# Patient Record
Sex: FEMALE | Race: WHITE | Employment: OTHER | URBAN - METROPOLITAN AREA
[De-identification: names, ages, dates, MRNs, and addresses within clinical notes are randomized per-mention and may not be internally consistent; named-entity substitution may affect disease eponyms.]

---

## 2017-01-12 DIAGNOSIS — I48.21 PERMANENT ATRIAL FIBRILLATION (H): Primary | ICD-10-CM

## 2017-01-12 RX ORDER — DABIGATRAN ETEXILATE 150 MG/1
150 CAPSULE ORAL 2 TIMES DAILY
Qty: 180 CAPSULE | Refills: 0 | Status: SHIPPED | OUTPATIENT
Start: 2017-01-12 | End: 2017-04-19

## 2017-04-18 ENCOUNTER — PRE VISIT (OUTPATIENT)
Dept: CARDIOLOGY | Facility: CLINIC | Age: 82
End: 2017-04-18

## 2017-04-19 ENCOUNTER — OFFICE VISIT (OUTPATIENT)
Dept: CARDIOLOGY | Facility: CLINIC | Age: 82
End: 2017-04-19
Attending: INTERNAL MEDICINE
Payer: COMMERCIAL

## 2017-04-19 VITALS
SYSTOLIC BLOOD PRESSURE: 146 MMHG | DIASTOLIC BLOOD PRESSURE: 89 MMHG | WEIGHT: 175.6 LBS | HEIGHT: 62 IN | HEART RATE: 68 BPM | BODY MASS INDEX: 32.31 KG/M2

## 2017-04-19 DIAGNOSIS — I48.21 PERMANENT ATRIAL FIBRILLATION (H): ICD-10-CM

## 2017-04-19 DIAGNOSIS — I10 ESSENTIAL HYPERTENSION, BENIGN: ICD-10-CM

## 2017-04-19 DIAGNOSIS — I10 ESSENTIAL HYPERTENSION, BENIGN: Primary | ICD-10-CM

## 2017-04-19 LAB
ALT SERPL W P-5'-P-CCNC: <5 U/L (ref 5–30)
ANION GAP SERPL CALCULATED.3IONS-SCNC: 9.6 MMOL/L (ref 6–17)
BUN SERPL-MCNC: 19 MG/DL (ref 7–30)
CALCIUM SERPL-MCNC: 8.8 MG/DL (ref 8.5–10.5)
CHLORIDE SERPL-SCNC: 97 MMOL/L (ref 98–107)
CHOLEST SERPL-MCNC: 157 MG/DL
CO2 SERPL-SCNC: 27 MMOL/L (ref 23–29)
CREAT SERPL-MCNC: 1.19 MG/DL (ref 0.7–1.3)
DIGOXIN SERPL-MCNC: 1.3 UG/L (ref 0.5–2)
ERYTHROCYTE [DISTWIDTH] IN BLOOD BY AUTOMATED COUNT: 12.4 % (ref 10–15)
GFR SERPL CREATININE-BSD FRML MDRD: 43 ML/MIN/1.7M2
GLUCOSE SERPL-MCNC: 81 MG/DL (ref 70–105)
HCT VFR BLD AUTO: 44.3 % (ref 35–47)
HDLC SERPL-MCNC: 52 MG/DL
HGB BLD-MCNC: 15.1 G/DL (ref 11.7–15.7)
LDLC SERPL CALC-MCNC: 71 MG/DL
MCH RBC QN AUTO: 33.7 PG (ref 26.5–33)
MCHC RBC AUTO-ENTMCNC: 34.1 G/DL (ref 31.5–36.5)
MCV RBC AUTO: 99 FL (ref 78–100)
NONHDLC SERPL-MCNC: 105 MG/DL
PLATELET # BLD AUTO: 359 10E9/L (ref 150–450)
POTASSIUM SERPL-SCNC: 4.6 MMOL/L (ref 3.5–5.1)
RBC # BLD AUTO: 4.48 10E12/L (ref 3.8–5.2)
SODIUM SERPL-SCNC: 129 MMOL/L (ref 136–145)
TRIGL SERPL-MCNC: 171 MG/DL
TSH SERPL DL<=0.05 MIU/L-ACNC: 1.48 MU/L (ref 0.4–4)
WBC # BLD AUTO: 10.3 10E9/L (ref 4–11)

## 2017-04-19 PROCEDURE — 99214 OFFICE O/P EST MOD 30 MIN: CPT | Mod: 25 | Performed by: NURSE PRACTITIONER

## 2017-04-19 PROCEDURE — 84460 ALANINE AMINO (ALT) (SGPT): CPT | Performed by: NURSE PRACTITIONER

## 2017-04-19 PROCEDURE — 80061 LIPID PANEL: CPT | Performed by: NURSE PRACTITIONER

## 2017-04-19 PROCEDURE — 80162 ASSAY OF DIGOXIN TOTAL: CPT | Performed by: NURSE PRACTITIONER

## 2017-04-19 PROCEDURE — 85027 COMPLETE CBC AUTOMATED: CPT | Performed by: NURSE PRACTITIONER

## 2017-04-19 PROCEDURE — 36415 COLL VENOUS BLD VENIPUNCTURE: CPT | Performed by: NURSE PRACTITIONER

## 2017-04-19 PROCEDURE — 93000 ELECTROCARDIOGRAM COMPLETE: CPT | Performed by: NURSE PRACTITIONER

## 2017-04-19 PROCEDURE — 80048 BASIC METABOLIC PNL TOTAL CA: CPT | Performed by: NURSE PRACTITIONER

## 2017-04-19 PROCEDURE — 84443 ASSAY THYROID STIM HORMONE: CPT | Performed by: NURSE PRACTITIONER

## 2017-04-19 RX ORDER — DIGOXIN 125 MCG
125 TABLET ORAL DAILY
Qty: 90 TABLET | Refills: 3 | Status: SHIPPED | OUTPATIENT
Start: 2017-04-19 | End: 2018-01-01

## 2017-04-19 RX ORDER — DABIGATRAN ETEXILATE 150 MG/1
150 CAPSULE ORAL 2 TIMES DAILY
Qty: 180 CAPSULE | Refills: 3 | Status: SHIPPED | OUTPATIENT
Start: 2017-04-19

## 2017-04-19 RX ORDER — DILTIAZEM HYDROCHLORIDE 180 MG/1
360 CAPSULE, EXTENDED RELEASE ORAL DAILY
Qty: 180 CAPSULE | Refills: 3 | Status: SHIPPED | OUTPATIENT
Start: 2017-04-19

## 2017-04-19 RX ORDER — RAMIPRIL 10 MG/1
10 CAPSULE ORAL 2 TIMES DAILY
Qty: 180 CAPSULE | Refills: 3 | Status: SHIPPED | OUTPATIENT
Start: 2017-04-19

## 2017-04-19 RX ORDER — METOPROLOL SUCCINATE 25 MG/1
25 TABLET, EXTENDED RELEASE ORAL DAILY
Qty: 90 TABLET | Refills: 3 | Status: SHIPPED | OUTPATIENT
Start: 2017-04-19 | End: 2018-01-01

## 2017-04-19 RX ORDER — SPIRONOLACTONE 25 MG/1
25 TABLET ORAL DAILY
Qty: 90 TABLET | Refills: 3 | Status: SHIPPED | OUTPATIENT
Start: 2017-04-19 | End: 2017-11-22 | Stop reason: DRUGHIGH

## 2017-04-19 RX ORDER — PRAZOSIN HYDROCHLORIDE 1 MG/1
1 CAPSULE ORAL 2 TIMES DAILY
Qty: 180 CAPSULE | Refills: 3 | Status: SHIPPED | OUTPATIENT
Start: 2017-04-19

## 2017-04-19 NOTE — MR AVS SNAPSHOT
After Visit Summary   4/19/2017    Tita Escobar    MRN: [id number]           Patient Information     Date Of Birth          9/23/1932        Visit Information        Provider Department      4/19/2017 2:30 PM Radha Reddy APRN CNP [facility] HEART [facility]        Today's Diagnoses     Essential hypertension, benign    -  1    Chronic atrial fibrillation (H)        Permanent atrial fibrillation (H)        Essential hypertension          Care Instructions    Labs today  Watch blood pressure  See  next year or sooner if you're having concerns        Follow-ups after your visit        Future tests that were ordered for you today     Open Future Orders        Priority Expected Expires Ordered    Digoxin level Routine 4/19/2017 4/19/2019 4/19/2017    Lipid Profile Routine 4/19/2017 4/19/2018 4/19/2017    ALT Routine 4/19/2017 4/19/2018 4/19/2017    Basic metabolic panel Routine 4/19/2017 4/14/2018 4/19/2017    TSH Routine 4/19/2017 4/14/2018 4/19/2017    CBC with platelets Routine 4/19/2017 4/14/2018 4/19/2017            Who to contact     If you have questions or need follow up information about today's clinic visit or your schedule please contact [facility] directly at [phone].  Normal or non-critical lab and imaging results will be communicated to you by Picuriohart, letter or phone within 4 business days after the clinic has received the results. If you do not hear from us within 7 days, please contact the clinic through Picuriohart or phone. If you have a critical or abnormal lab result, we will notify you by phone as soon as possible.  Submit refill requests through Plura Processing or call your pharmacy and they will forward the refill request to us. Please allow 3 business days for your refill to be completed.          Additional Information About Your Visit        PicurioharPaperless Transaction Management Information     Plura Processing lets you send messages to your doctor,  "view your test results, renew your prescriptions, schedule appointments and more. To sign up, go to [email or web address] . Click on \"Log in\" on the left side of the screen, which will take you to the Welcome page. Then click on \"Sign up Now\" on the right side of the page.     You will be asked to enter the access code listed below, as well as some personal information. Please follow the directions to create your username and password.     Your access code is: [email or web address]  Expires: 2017  3:18 PM     Your access code will  in 90 days. If you need help or a new code, please call your [facility] clinic or [phone].        Care EveryWhere ID     This is your Care EveryWhere ID. This could be used by other organizations to access your [facility] medical records  [id number]        Your Vitals Were     Pulse Height BMI (Body Mass Index)             68 1.568 m (5' 1.75\") 32.38 kg/m2          Blood Pressure from Last 3 Encounters:   17 146/89   16 140/84   12/11/15 158/84    Weight from Last 3 Encounters:   17 79.7 kg (175 lb 9.6 oz)   16 81.2 kg (179 lb)   12/11/15 79.8 kg (176 lb)              We Performed the Following     EKG 12-lead complete w/read (Future)- to be scheduled     Follow-Up with Cardiac Advanced Practice Provider          Today's Medication Changes          These changes are accurate as of: 17  3:18 PM.  If you have any questions, ask your nurse or doctor.               These medicines have changed or have updated prescriptions.        Dose/Directions    diltiazem 180 MG 24 hr ER beaded capsule   Commonly known as:  TIAZAC   This may have changed:    - how much to take  - when to take this   Used for:  Chronic atrial fibrillation (H)   Changed by:  Radha Reddy APRN CNP        Dose:  360 mg   Take 2 capsules (360 mg) by mouth daily Two tabs daily in am   Quantity:  180 capsule   Refills:  3            Where to get your medicines      These medications were " sent to [id number] - [place], [place] - [address]  [address] [address]     Phone:  [phone]     dabigatran ANTICOAGULANT 150 MG Caps capsule    digoxin 125 MCG tablet    diltiazem 180 MG 24 hr ER beaded capsule    metoprolol 25 MG 24 hr tablet    prazosin 1 MG capsule    ramipril 10 MG capsule    spironolactone 25 MG tablet                Primary Care Provider Office Phone # Fax #    Xiomy Shermna [phone]       [facility] [address] [address]        Thank you!     Thank you for choosing [facility] [place]  for your care. Our goal is always to provide you with excellent care. Hearing back from our patients is one way we can continue to improve our services. Please take a few minutes to complete the written survey that you may receive in the mail after your visit with us. Thank you!             Your Updated Medication List - Protect others around you: Learn how to safely use, store and throw away your medicines at www.disposemymeds.org.          This list is accurate as of: 4/19/17  3:18 PM.  Always use your most recent med list.                   Brand Name Dispense Instructions for use    albuterol 108 (90 BASE) MCG/ACT Inhaler    PROAIR HFA/PROVENTIL HFA/VENTOLIN HFA     Inhale 2 puffs into the lungs every 6 hours       ALLEGRA PO      Take 180 mg by mouth daily       dabigatran ANTICOAGULANT 150 MG Caps capsule    PRADAXA ANTICOAGULANT    180 capsule    Take 1 capsule (150 mg) by mouth 2 times daily       digoxin 125 MCG tablet    LANOXIN    90 tablet    Take 1 tablet (125 mcg) by mouth daily       diltiazem 180 MG 24 hr ER beaded capsule    TIAZAC    180 capsule    Take 2 capsules (360 mg) by mouth daily Two tabs daily in am       metoprolol 25 MG 24 hr tablet    TOPROL-XL    90 tablet    Take 1 tablet (25 mg) by mouth daily       prazosin 1 MG capsule    MINIPRESS     180 capsule    Take 1 capsule (1 mg) by mouth 2 times daily       ramipril 10 MG capsule    ALTACE    180 capsule    Take 1 capsule (10 mg) by mouth 2 times daily       sertraline 25 MG tablet    ZOLOFT     Take 25 mg by mouth daily       spironolactone 25 MG tablet    ALDACTONE    90 tablet    Take 1 tablet (25 mg) by mouth daily

## 2017-04-19 NOTE — LETTER
4/19/2017    MINDY MENDEZ  [facility]   [address]  [place] [address]    RE: Tita Escobar       Dear Colleague,    I had the pleasure of seeing Tita Escobar in the [facility].    Ms. Escobar is a delightful 84-year-old woman well known to me here.  She is here today for followup.  She was seen last by Dr. Augustine a year ago.  Briefly, she has a history of labile hypertension and permanent atrial fibrillation.  She is on Pradaxa without any bleeding concerns.  She has no complaints of palpitation, shortness of breath, lightheadedness or dizziness.  She continues to be very active and tries to exercise on a regular basis.  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  She is followed by Dr. Mendez.  She states that she is in the process of getting a new primary care doctor and is hoping to get in one in the next several months.      Echocardiogram 09/2015 shows an EF of 55%-60%.  She had mild to moderate TR.  Right ventricular systolic function was mildly reduced.  She had 1 to 2+ tricuspid insufficiency with normal RVSP.  Lab work included a BMP last year.  She has not had lipids done for over 7 years.  She is here today for followup.  All other review of systems, past medical history and physical exam are noted below.     Outpatient Encounter Prescriptions as of 4/19/2017   Medication Sig Dispense Refill     dabigatran ANTICOAGULANT (PRADAXA ANTICOAGULANT) 150 MG CAPS capsule Take 1 capsule (150 mg) by mouth 2 times daily 180 capsule 3     digoxin (LANOXIN) 125 MCG tablet Take 1 tablet (125 mcg) by mouth daily 90 tablet 3     metoprolol (TOPROL-XL) 25 MG 24 hr tablet Take 1 tablet (25 mg) by mouth daily 90 tablet 3     spironolactone (ALDACTONE) 25 MG tablet Take 1 tablet (25 mg) by mouth daily 90 tablet 3     diltiazem (TIAZAC) 180 MG 24 hr ER beaded capsule Take 2 capsules (360 mg) by mouth daily Two tabs daily in am 180 capsule 3     ramipril (ALTACE) 10  MG capsule Take 1 capsule (10 mg) by mouth 2 times daily 180 capsule 3     prazosin (MINIPRESS) 1 MG capsule Take 1 capsule (1 mg) by mouth 2 times daily 180 capsule 3     albuterol (PROAIR HFA, PROVENTIL HFA, VENTOLIN HFA) 108 (90 BASE) MCG/ACT inhaler Inhale 2 puffs into the lungs every 6 hours       sertraline (ZOLOFT) 25 MG tablet Take 25 mg by mouth daily       Fexofenadine HCl (ALLEGRA PO) Take 180 mg by mouth daily       [DISCONTINUED] dabigatran ANTICOAGULANT (PRADAXA ANTICOAGULANT) 150 MG CAPS capsule Take 1 capsule (150 mg) by mouth 2 times daily 180 capsule 0     [DISCONTINUED] spironolactone (ALDACTONE) 25 MG tablet Take 1 tablet (25 mg) by mouth daily 90 tablet 1     [DISCONTINUED] metoprolol (TOPROL-XL) 25 MG 24 hr tablet Take 1 tablet (25 mg) by mouth daily 90 tablet 3     [DISCONTINUED] ramipril (ALTACE) 10 MG capsule Take 1 capsule (10 mg) by mouth 2 times daily       [DISCONTINUED] digoxin (LANOXIN) 125 MCG tablet Take 1 tablet (125 mcg) by mouth daily       [DISCONTINUED] prazosin (MINIPRESS) 1 MG capsule Take 1 mg by mouth 2 times daily       [DISCONTINUED] diltiazem (TIAZAC) 180 MG 24 hr ER beaded capsule Two tabs daily in am       No facility-administered encounter medications on file as of 4/19/2017.       ASSESSMENT AND PLAN:   1.  Ms. Escobar is a delightful 84-year-old woman well known to me here at the clinic.  She has a history of permanent atrial fibrillation.  Her CHADS-VASc score is 6 including hypertension, female, previous CVA, and age.  She is doing well on Pradaxa, does not offer any concerns regarding this medication.  Her 12-lead EKG today shows atrial fibrillation with controlled ventricular response at 75 beats per minute.  Right bundle branch block is appreciated and chronic.  She remains on also metoprolol 25 mg daily, diltiazem 360 daily, digoxin 125 mcg daily.   2.  Hypertension.  Slightly elevated today, but the patient states that she can have systolic blood pressures  ranging from  at home.  She has occasional episodes of lightheadedness.  She is on metoprolol, spironolactone 25 mg daily, diltiazem 360 mg daily, Altace 10 mg daily, Minipress 1 mg b.i.d.  Blood pressure today is 146/89.        The patient will have labs completed today as I refill her annual prescriptions.  I will do a lipid, ALT (non-fasting), BMP, CBC and a TSH.  I will get these results to her.  We will also send a copy to her new primary once this is an established.        She will see Dr. Augustine in 1 year.  If there are questions or concerns, I have encouraged her to contact us.  As always, thank you for including us in her care.     Sincerely,    Radha Reddy NP, APRN CNP     [facility]

## 2017-04-19 NOTE — PROGRESS NOTES
HPI and Plan: #[id number]  See dictation    Orders Placed This Encounter   Procedures     Basic metabolic panel     TSH     CBC with platelets     Lipid Profile     ALT     Digoxin level       Orders Placed This Encounter   Medications     dabigatran ANTICOAGULANT (PRADAXA ANTICOAGULANT) 150 MG CAPS capsule     Sig: Take 1 capsule (150 mg) by mouth 2 times daily     Dispense:  180 capsule     Refill:  3     digoxin (LANOXIN) 125 MCG tablet     Sig: Take 1 tablet (125 mcg) by mouth daily     Dispense:  90 tablet     Refill:  3     metoprolol (TOPROL-XL) 25 MG 24 hr tablet     Sig: Take 1 tablet (25 mg) by mouth daily     Dispense:  90 tablet     Refill:  3     spironolactone (ALDACTONE) 25 MG tablet     Sig: Take 1 tablet (25 mg) by mouth daily     Dispense:  90 tablet     Refill:  3     diltiazem (TIAZAC) 180 MG 24 hr ER beaded capsule     Sig: Take 2 capsules (360 mg) by mouth daily Two tabs daily in am     Dispense:  180 capsule     Refill:  3     ramipril (ALTACE) 10 MG capsule     Sig: Take 1 capsule (10 mg) by mouth 2 times daily     Dispense:  180 capsule     Refill:  3     prazosin (MINIPRESS) 1 MG capsule     Sig: Take 1 capsule (1 mg) by mouth 2 times daily     Dispense:  180 capsule     Refill:  3       Medications Discontinued During This Encounter   Medication Reason     dabigatran ANTICOAGULANT (PRADAXA ANTICOAGULANT) 150 MG CAPS capsule Reorder     digoxin (LANOXIN) 125 MCG tablet Reorder     metoprolol (TOPROL-XL) 25 MG 24 hr tablet Reorder     spironolactone (ALDACTONE) 25 MG tablet Reorder     diltiazem (TIAZAC) 180 MG 24 hr ER beaded capsule Reorder     ramipril (ALTACE) 10 MG capsule Reorder     prazosin (MINIPRESS) 1 MG capsule Reorder         Encounter Diagnoses   Name Primary?     Chronic atrial fibrillation (H)      Essential hypertension, benign Yes     Permanent atrial fibrillation (H)      Essential hypertension        CURRENT MEDICATIONS:  Current Outpatient Prescriptions   Medication Sig  Dispense Refill     dabigatran ANTICOAGULANT (PRADAXA ANTICOAGULANT) 150 MG CAPS capsule Take 1 capsule (150 mg) by mouth 2 times daily 180 capsule 3     digoxin (LANOXIN) 125 MCG tablet Take 1 tablet (125 mcg) by mouth daily 90 tablet 3     metoprolol (TOPROL-XL) 25 MG 24 hr tablet Take 1 tablet (25 mg) by mouth daily 90 tablet 3     spironolactone (ALDACTONE) 25 MG tablet Take 1 tablet (25 mg) by mouth daily 90 tablet 3     diltiazem (TIAZAC) 180 MG 24 hr ER beaded capsule Take 2 capsules (360 mg) by mouth daily Two tabs daily in am 180 capsule 3     ramipril (ALTACE) 10 MG capsule Take 1 capsule (10 mg) by mouth 2 times daily 180 capsule 3     prazosin (MINIPRESS) 1 MG capsule Take 1 capsule (1 mg) by mouth 2 times daily 180 capsule 3     albuterol (PROAIR HFA, PROVENTIL HFA, VENTOLIN HFA) 108 (90 BASE) MCG/ACT inhaler Inhale 2 puffs into the lungs every 6 hours       sertraline (ZOLOFT) 25 MG tablet Take 25 mg by mouth daily       Fexofenadine HCl (ALLEGRA PO) Take 180 mg by mouth daily       [DISCONTINUED] spironolactone (ALDACTONE) 25 MG tablet Take 1 tablet (25 mg) by mouth daily 90 tablet 1     [DISCONTINUED] metoprolol (TOPROL-XL) 25 MG 24 hr tablet Take 1 tablet (25 mg) by mouth daily 90 tablet 3     [DISCONTINUED] ramipril (ALTACE) 10 MG capsule Take 1 capsule (10 mg) by mouth 2 times daily       [DISCONTINUED] digoxin (LANOXIN) 125 MCG tablet Take 1 tablet (125 mcg) by mouth daily       [DISCONTINUED] prazosin (MINIPRESS) 1 MG capsule Take 1 mg by mouth 2 times daily       [DISCONTINUED] diltiazem (TIAZAC) 180 MG 24 hr ER beaded capsule Two tabs daily in am         ALLERGIES     Allergies   Allergen Reactions     Benicar [Olmesartan] Shortness Of Breath     Augmentin Diarrhea     Flecainide      Latex        PAST MEDICAL HISTORY:  Past Medical History:   Diagnosis Date     Asthma      CVA (cerebral vascular accident) (H) 2009     Hyperlipidaemia      Hypertension      Persistent atrial fibrillation (H)   "    Rupture of uterus 1957       PAST SURGICAL HISTORY:  Past Surgical History:   Procedure Laterality Date     HYSTEROSCOPY  2014    x 2      KNEE SURGERY  2014       FAMILY HISTORY:  Family History   Problem Relation Age of Onset     HEART DISEASE Father      Hypertension Father      Hypertension Sister        SOCIAL HISTORY:  Social History     Social History     Marital status:      Spouse name: N/A     Number of children: N/A     Years of education: N/A     Social History Main Topics     Smoking status: Former Smoker     Quit date: 1/1/1982     Smokeless tobacco: None     Alcohol use Yes      Comment: occasional     Drug use: No     Sexual activity: Not Asked     Other Topics Concern     Caffeine Concern No     1-2 cups coffee in the morning     Sleep Concern No     Stress Concern Yes     states she always has been high stressed     Weight Concern Yes     Special Diet No     watches what she eats     Exercise Yes     45 min speed walking or jogging     Seat Belt Yes     Social History Narrative       Review of Systems:  Skin:  Positive for rash on face   Eyes:  Positive for glasses reading  ENT:  Negative      Respiratory:  not assessed cough     Cardiovascular:  Negative      Gastroenterology: Positive for heartburn;constipation    Genitourinary:  Negative      Musculoskeletal:  Negative      Neurologic:  Positive for stroke    Psychiatric:  Positive for depression    Heme/Lymph/Imm:  Positive for weight loss;allergies    Endocrine:  Negative        Physical Exam:  Vitals: /89  Pulse 68  Ht 1.568 m (5' 1.75\")  Wt 79.7 kg (175 lb 9.6 oz)  BMI 32.38 kg/m2    Constitutional:  cooperative, alert and oriented, well developed, well nourished, in no acute distress        Skin:  warm and dry to the touch, no apparent skin lesions or masses noted        Head:  normocephalic, no masses or lesions        Eyes:  conjunctivae and lids unremarkable        ENT:  no pallor or cyanosis, dentition good    "     Neck:  JVP normal        Chest:  normal breath sounds, clear to auscultation, normal A-P diameter, normal symmetry, normal respiratory excursion, no use of accessory muscles          Cardiac: no S3 or S4 irregularly irregular rhythm                Abdomen:  abdomen soft        Vascular: not assessed this visit                                        Extremities and Back:  no deformities, clubbing, cyanosis, erythema observed;no edema stasis pigmentation       fine tremor    Neurological:  affect appropriate, oriented to time, person and place;no gross motor deficits              CC  Jaden Augustine MD   [facility]  [address]  [address]  [place] [address]

## 2017-04-20 NOTE — PROGRESS NOTES
HISTORY OF PRESENT ILLNESS:  Ms. Escobar is a delightful 84-year-old woman well known to me here.  She is here today for followup.  She was seen last by Dr. Augustine a year ago.  Briefly, she has a history of labile hypertension and permanent atrial fibrillation.  She is on Pradaxa without any bleeding concerns.  She has no complaints of palpitation, shortness of breath, lightheadedness or dizziness.  She continues to be very active and tries to exercise on a regular basis.  She also volunteers driving seniors for appointments.  She is followed by Dr. Sherman.  She states that she is in the process of getting a new primary care doctor and is hoping to get in one in the next several months.      Echocardiogram 09/2015 shows an EF of 55%-60%.  She had mild to moderate TR.  Right ventricular systolic function was mildly reduced.  She had 1 to 2+ tricuspid insufficiency with normal RVSP.  Lab work included a BMP last year.  She has not had lipids done for over 7 years.  She is here today for followup.  All other review of systems, past medical history and physical exam are noted below.      ASSESSMENT AND PLAN:   1.  Ms. Escobar is a delightful 84-year-old woman well known to me here at the clinic.  She has a history of permanent atrial fibrillation.  Her CHADS-VASc score is 6 including hypertension, female, previous CVA, and age.  She is doing well on Pradaxa, does not offer any concerns regarding this medication.  Her 12-lead EKG today shows atrial fibrillation with controlled ventricular response at 75 beats per minute.  Right bundle branch block is appreciated and chronic.  She remains on also metoprolol 25 mg daily, diltiazem 360 daily, digoxin 125 mcg daily.   2.  Hypertension.  Slightly elevated today, but the patient states that she can have systolic blood pressures ranging from  at home.  She has occasional episodes of lightheadedness.  She is on metoprolol, spironolactone 25 mg daily, diltiazem 360 mg daily, Altace  10 mg daily, Minipress 1 mg b.i.d.  Blood pressure today is 146/89.        The patient will have labs completed today as I refill her annual prescriptions.  I will do a lipid, ALT (non-fasting), BMP, CBC and a TSH.  I will get these results to her.  We will also send a copy to her new primary once this is an established.        She will see Dr. Augustine in 1 year.  If there are questions or concerns, I have encouraged her to contact us.  As always, thank you for including us in her care.         KEESHA OROZCO NP             D: 2017 15:40   T: 2017 12:06   MT: NORMAN      Name:     SHAHRAM ZAMORA   MRN:      [id number]        Account:      [id number]   :      1932           Service Date: 2017      Document: [id number]

## 2017-05-05 ENCOUNTER — TELEPHONE (OUTPATIENT)
Dept: CARDIOLOGY | Facility: CLINIC | Age: 82
End: 2017-05-05

## 2017-05-05 NOTE — TELEPHONE ENCOUNTER
Pt calling and requesting samples of pradaxa. She is in the donut hole and states the pradaxa will cost $ 1000.00 per month starting in June thru Jan. Informed her that she can have a months supply of samples but she needs to find a long term solution to the problem. Recommended she call insurance company and find out if eliquis or xarelto are covered and what the cost would be. She should call us back with what med she wants to change to. Set out  Samples ( 60 ) of pradaxa.

## 2017-07-13 ENCOUNTER — TELEPHONE (OUTPATIENT)
Dept: CARDIOLOGY | Facility: CLINIC | Age: 82
End: 2017-07-13

## 2017-07-13 NOTE — TELEPHONE ENCOUNTER
Derian called this morning wondering about a follow up appointment with Dr. Augustine - she recently saw Radha in April and thought for sure she was supposed to follow up in the fall. I let her know that Radha wanted her to be seen by Dr. Augustine next year unless she has any problems first. Patient verbalized understanding, no further questions at this time. MASSIMO Perea

## 2017-11-14 ENCOUNTER — TRANSFERRED RECORDS (OUTPATIENT)
Dept: HEALTH INFORMATION MANAGEMENT | Facility: CLINIC | Age: 82
End: 2017-11-14

## 2017-11-16 ENCOUNTER — TELEPHONE (OUTPATIENT)
Dept: CARDIOLOGY | Facility: CLINIC | Age: 82
End: 2017-11-16

## 2017-11-16 NOTE — TELEPHONE ENCOUNTER
Patient called in to say that she is having knee arthroplasty tomorrow at [facility] and is wondering what to do with Pradaxa ( CHADS VASc 6 which includes CVA). She states that the surgeon deferred to cardiology. She further states that she saw Dr. Barraza 3 days ago who advised a one day hold. I reviewed with Radha and she advises to hold tonight's dose as well as tomorrow morning's dose. She also wants her to be clear that there was not enough time to bridge given her high risk for CVA. Patient states that she accepts this risk and verbalized to be that she will with hold tonight and tomorrow's dose. I also informed to to tell the personnel at [facility] what this plan is. Kanu

## 2017-11-20 ENCOUNTER — TELEPHONE (OUTPATIENT)
Dept: CARDIOLOGY | Facility: CLINIC | Age: 82
End: 2017-11-20

## 2017-11-20 NOTE — TELEPHONE ENCOUNTER
Phone message left by pt; states she recently had surgery; is calling to request that a potassium be drawn. I returned call but had to leave a message for her to call back. Would like more information re: this request. Recent labs (4-2017) show a normal K+ level. [email or web address]

## 2017-11-22 ENCOUNTER — TELEPHONE (OUTPATIENT)
Dept: CARDIOLOGY | Facility: CLINIC | Age: 82
End: 2017-11-22

## 2017-11-22 DIAGNOSIS — I10 HYPERTENSION: Primary | ICD-10-CM

## 2017-11-22 RX ORDER — SPIRONOLACTONE 25 MG/1
25 TABLET ORAL DAILY
Qty: 30 TABLET | COMMUNITY
Start: 2017-11-22 | End: 2018-01-01

## 2017-11-22 NOTE — TELEPHONE ENCOUNTER
Patient's daughter Michael ( [phone])  called yesterday to say that her PMD wanted cardiology input concerning labs that were done on 11/14 as part of a preop workup. At that visit the PMD told her to hold her spironolactone 25 MG because of a K+ of 5.2 and to see cardiology. Daughter says that she is back on spironolactone 25 MG. I obtained records this morning from [facility]. Her K+ was 5.2 and creatinine 0.94. I reviewed this with Radha this morning and her recommendation is to lower her spironolactone to 12.5 MG daily and repeat a BMP in 1-2 weeks. Her daughter will bring her to our clinic and will call the clinic next Monday to schedule the labs. Her daughter clearly understood the plan and was appreciative of our help. Kanu

## 2018-01-01 ENCOUNTER — OFFICE VISIT (OUTPATIENT)
Dept: CARDIOLOGY | Facility: CLINIC | Age: 83
End: 2018-01-01
Attending: INTERNAL MEDICINE
Payer: COMMERCIAL

## 2018-01-01 ENCOUNTER — TELEPHONE (OUTPATIENT)
Dept: CARDIOLOGY | Facility: CLINIC | Age: 83
End: 2018-01-01

## 2018-01-01 ENCOUNTER — HOSPITAL ENCOUNTER (EMERGENCY)
Facility: CLINIC | Age: 83
Discharge: HOME | End: 2018-12-25
Attending: EMERGENCY MEDICINE | Admitting: EMERGENCY MEDICINE
Payer: COMMERCIAL

## 2018-01-01 VITALS
RESPIRATION RATE: 16 BRPM | HEART RATE: 101 BPM | TEMPERATURE: 97.7 F | OXYGEN SATURATION: 100 % | DIASTOLIC BLOOD PRESSURE: 78 MMHG | SYSTOLIC BLOOD PRESSURE: 115 MMHG

## 2018-01-01 VITALS
HEIGHT: 62 IN | HEART RATE: 56 BPM | DIASTOLIC BLOOD PRESSURE: 74 MMHG | BODY MASS INDEX: 29.44 KG/M2 | SYSTOLIC BLOOD PRESSURE: 156 MMHG | WEIGHT: 160 LBS

## 2018-01-01 DIAGNOSIS — R55 SYNCOPE, UNSPECIFIED SYNCOPE TYPE: ICD-10-CM

## 2018-01-01 DIAGNOSIS — I10 ESSENTIAL HYPERTENSION: Primary | ICD-10-CM

## 2018-01-01 DIAGNOSIS — I48.20 CHRONIC ATRIAL FIBRILLATION (H): ICD-10-CM

## 2018-01-01 LAB
ALBUMIN SERPL-MCNC: 3.5 G/DL (ref 3.4–5)
ALP SERPL-CCNC: 93 U/L (ref 40–150)
ALT SERPL W P-5'-P-CCNC: 18 U/L (ref 0–50)
ANION GAP SERPL CALCULATED.3IONS-SCNC: 6 MMOL/L (ref 3–14)
AST SERPL W P-5'-P-CCNC: 19 U/L (ref 0–45)
BASOPHILS # BLD AUTO: 0 10E9/L (ref 0–0.2)
BASOPHILS NFR BLD AUTO: 0.4 %
BILIRUB SERPL-MCNC: 0.3 MG/DL (ref 0.2–1.3)
BUN SERPL-MCNC: 19 MG/DL (ref 7–30)
CALCIUM SERPL-MCNC: 8.7 MG/DL (ref 8.5–10.1)
CHLORIDE SERPL-SCNC: 95 MMOL/L (ref 94–109)
CO2 SERPL-SCNC: 29 MMOL/L (ref 20–32)
CREAT SERPL-MCNC: 0.99 MG/DL (ref 0.52–1.04)
DIFFERENTIAL METHOD BLD: NORMAL
EOSINOPHIL # BLD AUTO: 0.1 10E9/L (ref 0–0.7)
EOSINOPHIL NFR BLD AUTO: 1.1 %
ERYTHROCYTE [DISTWIDTH] IN BLOOD BY AUTOMATED COUNT: 13.1 % (ref 10–15)
GFR SERPL CREATININE-BSD FRML MDRD: 52 ML/MIN/{1.73_M2}
GLUCOSE SERPL-MCNC: 93 MG/DL (ref 70–99)
HCT VFR BLD AUTO: 37.7 % (ref 35–47)
HGB BLD-MCNC: 13 G/DL (ref 11.7–15.7)
IMM GRANULOCYTES # BLD: 0 10E9/L (ref 0–0.4)
IMM GRANULOCYTES NFR BLD: 0.3 %
INTERPRETATION ECG - MUSE: NORMAL
LYMPHOCYTES # BLD AUTO: 1.2 10E9/L (ref 0.8–5.3)
LYMPHOCYTES NFR BLD AUTO: 16.3 %
MCH RBC QN AUTO: 31.2 PG (ref 26.5–33)
MCHC RBC AUTO-ENTMCNC: 34.5 G/DL (ref 31.5–36.5)
MCV RBC AUTO: 90 FL (ref 78–100)
MONOCYTES # BLD AUTO: 0.7 10E9/L (ref 0–1.3)
MONOCYTES NFR BLD AUTO: 10.1 %
NEUTROPHILS # BLD AUTO: 5.3 10E9/L (ref 1.6–8.3)
NEUTROPHILS NFR BLD AUTO: 71.8 %
NRBC # BLD AUTO: 0 10*3/UL
NRBC BLD AUTO-RTO: 0 /100
PLATELET # BLD AUTO: 272 10E9/L (ref 150–450)
POTASSIUM SERPL-SCNC: 5 MMOL/L (ref 3.4–5.3)
PROT SERPL-MCNC: 7.3 G/DL (ref 6.8–8.8)
RBC # BLD AUTO: 4.17 10E12/L (ref 3.8–5.2)
SODIUM SERPL-SCNC: 130 MMOL/L (ref 133–144)
TROPONIN I SERPL-MCNC: <0.015 UG/L (ref 0–0.04)
WBC # BLD AUTO: 7.4 10E9/L (ref 4–11)

## 2018-01-01 PROCEDURE — 80053 COMPREHEN METABOLIC PANEL: CPT | Performed by: EMERGENCY MEDICINE

## 2018-01-01 PROCEDURE — 85025 COMPLETE CBC W/AUTO DIFF WBC: CPT | Performed by: EMERGENCY MEDICINE

## 2018-01-01 PROCEDURE — 93000 ELECTROCARDIOGRAM COMPLETE: CPT | Performed by: INTERNAL MEDICINE

## 2018-01-01 PROCEDURE — 93005 ELECTROCARDIOGRAM TRACING: CPT

## 2018-01-01 PROCEDURE — 99214 OFFICE O/P EST MOD 30 MIN: CPT | Performed by: INTERNAL MEDICINE

## 2018-01-01 PROCEDURE — 99284 EMERGENCY DEPT VISIT MOD MDM: CPT

## 2018-01-01 PROCEDURE — 84484 ASSAY OF TROPONIN QUANT: CPT | Performed by: EMERGENCY MEDICINE

## 2018-01-01 RX ORDER — ACETAMINOPHEN 325 MG/1
650 TABLET ORAL DAILY
COMMUNITY

## 2018-01-01 RX ORDER — SENNOSIDES 8.6 MG
8.6 CAPSULE ORAL DAILY
COMMUNITY

## 2018-01-01 RX ORDER — SPIRONOLACTONE 25 MG/1
25 TABLET ORAL DAILY
Qty: 90 TABLET | Refills: 3 | Status: SHIPPED | OUTPATIENT
Start: 2018-01-01 | End: 2019-01-01

## 2018-01-01 RX ORDER — DIPHENHYDRAMINE HCL 25 MG
25 CAPSULE ORAL 2 TIMES DAILY PRN
COMMUNITY

## 2018-01-01 ASSESSMENT — ENCOUNTER SYMPTOMS
SHORTNESS OF BREATH: 0
NUMBNESS: 0
SEIZURES: 0
VOMITING: 0
WEAKNESS: 0

## 2018-01-03 ENCOUNTER — RECORDS - HEALTHEAST (OUTPATIENT)
Dept: LAB | Facility: CLINIC | Age: 83
End: 2018-01-03

## 2018-01-03 LAB
ANION GAP SERPL CALCULATED.3IONS-SCNC: 10 MMOL/L (ref 5–18)
BUN SERPL-MCNC: 12 MG/DL (ref 8–28)
CALCIUM SERPL-MCNC: 8.8 MG/DL (ref 8.5–10.5)
CHLORIDE BLD-SCNC: 95 MMOL/L (ref 98–107)
CO2 SERPL-SCNC: 27 MMOL/L (ref 22–31)
CREAT SERPL-MCNC: 0.71 MG/DL (ref 0.6–1.1)
GFR SERPL CREATININE-BSD FRML MDRD: >60 ML/MIN/1.73M2
GLUCOSE BLD-MCNC: 77 MG/DL (ref 70–125)
POTASSIUM BLD-SCNC: 4 MMOL/L (ref 3.5–5)
SODIUM SERPL-SCNC: 132 MMOL/L (ref 136–145)

## 2018-01-14 ENCOUNTER — RECORDS - HEALTHEAST (OUTPATIENT)
Dept: LAB | Facility: CLINIC | Age: 83
End: 2018-01-14

## 2018-01-15 LAB
ANION GAP SERPL CALCULATED.3IONS-SCNC: 6 MMOL/L (ref 5–18)
BUN SERPL-MCNC: 11 MG/DL (ref 8–28)
CALCIUM SERPL-MCNC: 9.1 MG/DL (ref 8.5–10.5)
CHLORIDE BLD-SCNC: 97 MMOL/L (ref 98–107)
CO2 SERPL-SCNC: 30 MMOL/L (ref 22–31)
CREAT SERPL-MCNC: 0.72 MG/DL (ref 0.6–1.1)
GFR SERPL CREATININE-BSD FRML MDRD: >60 ML/MIN/1.73M2
GLUCOSE BLD-MCNC: 79 MG/DL (ref 70–125)
POTASSIUM BLD-SCNC: 4 MMOL/L (ref 3.5–5)
SODIUM SERPL-SCNC: 133 MMOL/L (ref 136–145)
VIT B12 SERPL-MCNC: 215 PG/ML (ref 213–816)

## 2018-07-17 NOTE — PROGRESS NOTES
HPI and Plan:   See dictation    Orders Placed This Encounter   Procedures     Follow-Up with Cardiac Advanced Practice Provider     Follow-Up with Electrophysiologist     EKG 12-lead complete w/read - Clinics (performed today)     EKG 12-lead complete w/read (Future)- to be scheduled       Orders Placed This Encounter   Medications     Sennosides (SENNA) 8.6 MG CAPS     Sig: Take 8.6 mg by mouth as needed     Inulin (FIBER CHOICE PO)     Simethicone (GAS-X EXTRA STRENGTH PO)     Sig: Take 80 mg by mouth as needed     Hypromellose (ARTIFICIAL TEARS OP)     Sig: Apply to eye as needed     diphenhydrAMINE (DIPHENHIST) 25 MG capsule     Sig: Take 25 mg by mouth every 6 hours as needed for itching or allergies     acetaminophen (TYLENOL) 325 MG tablet     Sig: Take 325-650 mg by mouth every 6 hours as needed for mild pain     spironolactone (ALDACTONE) 25 MG tablet     Sig: Take 1 tablet (25 mg) by mouth daily     Dispense:  90 tablet     Refill:  3       Medications Discontinued During This Encounter   Medication Reason     digoxin (LANOXIN) 125 MCG tablet      metoprolol (TOPROL-XL) 25 MG 24 hr tablet      spironolactone (ALDACTONE) 25 MG tablet          Encounter Diagnoses   Name Primary?     Chronic atrial fibrillation (H)      Essential hypertension Yes       CURRENT MEDICATIONS:  Current Outpatient Prescriptions   Medication Sig Dispense Refill     acetaminophen (TYLENOL) 325 MG tablet Take 325-650 mg by mouth every 6 hours as needed for mild pain       albuterol (PROAIR HFA, PROVENTIL HFA, VENTOLIN HFA) 108 (90 BASE) MCG/ACT inhaler Inhale 2 puffs into the lungs every 6 hours       dabigatran ANTICOAGULANT (PRADAXA ANTICOAGULANT) 150 MG CAPS capsule Take 1 capsule (150 mg) by mouth 2 times daily 180 capsule 3     diltiazem (TIAZAC) 180 MG 24 hr ER beaded capsule Take 2 capsules (360 mg) by mouth daily Two tabs daily in am 180 capsule 3     diphenhydrAMINE (DIPHENHIST) 25 MG capsule Take 25 mg by mouth every 6  hours as needed for itching or allergies       Hypromellose (ARTIFICIAL TEARS OP) Apply to eye as needed       Inulin (FIBER CHOICE PO)        prazosin (MINIPRESS) 1 MG capsule Take 1 capsule (1 mg) by mouth 2 times daily 180 capsule 3     ramipril (ALTACE) 10 MG capsule Take 1 capsule (10 mg) by mouth 2 times daily 180 capsule 3     Sennosides (SENNA) 8.6 MG CAPS Take 8.6 mg by mouth as needed       sertraline (ZOLOFT) 25 MG tablet Take 25 mg by mouth daily       Simethicone (GAS-X EXTRA STRENGTH PO) Take 80 mg by mouth as needed       spironolactone (ALDACTONE) 25 MG tablet Take 1 tablet (25 mg) by mouth daily 90 tablet 3     Fexofenadine HCl (ALLEGRA PO) Take 180 mg by mouth daily       [DISCONTINUED] spironolactone (ALDACTONE) 25 MG tablet 25 mg by Oral or Feeding Tube route daily Take 1/2 tablet (12.5 MG) daily 30 tablet        ALLERGIES     Allergies   Allergen Reactions     Benicar [Olmesartan] Shortness Of Breath     Augmentin Diarrhea     Flecainide      Latex        PAST MEDICAL HISTORY:  Past Medical History:   Diagnosis Date     Asthma      CVA (cerebral vascular accident) (H) 2009     Hyperlipidaemia      Hypertension      Persistent atrial fibrillation (H)      Rupture of uterus 1957       PAST SURGICAL HISTORY:  Past Surgical History:   Procedure Laterality Date     HYSTEROSCOPY  2014    x 2      KNEE SURGERY  2014       FAMILY HISTORY:  Family History   Problem Relation Age of Onset     HEART DISEASE Father      Hypertension Father      Hypertension Sister        SOCIAL HISTORY:  Social History     Social History     Marital status:      Spouse name: N/A     Number of children: N/A     Years of education: N/A     Social History Main Topics     Smoking status: Former Smoker     Quit date: 1/1/1982     Smokeless tobacco: Never Used     Alcohol use Yes      Comment: occasional     Drug use: No     Sexual activity: Not Asked     Other Topics Concern     Caffeine Concern No     1-2 cups coffee in  "the morning     Sleep Concern No     Stress Concern Yes     states she always has been high stressed     Weight Concern Yes     Special Diet No     watches what she eats     Exercise Yes     45 min speed walking or jogging     Seat Belt Yes     Social History Narrative       Review of Systems:  Skin:  Positive for rash on face   Eyes:  Positive for glasses reading  ENT:  Negative   allergies   Respiratory:  not assessed cough     Cardiovascular:  Negative      Gastroenterology: Positive for heartburn;constipation    Genitourinary:  Negative hematuria    Musculoskeletal:  Negative      Neurologic:  Positive for stroke  (hx - 2000)  Psychiatric:  Positive for depression    Heme/Lymph/Imm:  Positive for weight loss;allergies    Endocrine:  Negative diabetes      Physical Exam:  Vitals: /74  Pulse 56  Ht 1.568 m (5' 1.73\")  Wt 72.6 kg (160 lb)  BMI 29.52 kg/m2    Constitutional:  cooperative, alert and oriented, well developed, well nourished, in no acute distress        Skin:  warm and dry to the touch, no apparent skin lesions or masses noted          Head:  normocephalic, no masses or lesions        Eyes:  conjunctivae and lids unremarkable        Lymph:      ENT:  no pallor or cyanosis, dentition good        Neck:  JVP normal        Respiratory:  normal breath sounds, clear to auscultation, normal A-P diameter, normal symmetry, normal respiratory excursion, no use of accessory muscles         Cardiac: no S3 or S4 irregularly irregular rhythm              not assessed this visit                                        GI:  abdomen soft obese      Extremities and Muscular Skeletal:  no deformities, clubbing, cyanosis, erythema observed;no edema stasis pigmentation 1+;bilateral LE edema     fine tremor    Neurological:           Psych:           CC  Jaden Augustine MD  [address]  [address]  [place] [address]              "

## 2018-07-17 NOTE — PROGRESS NOTES
Service Date: 2018      HISTORY OF PRESENT ILLNESS:  I saw Ms. Escobar for followup of atrial fibrillation and hypertension.  Since the last clinic visit about a year ago, she has not had an ER visit or hospitalization.  Symptomatically, she denies palpitation, fatigue or shortness of breath.  She has no chest pain.  She is using a walker for ambulation.      PHYSICAL EXAMINATION:   VITAL SIGNS:  Blood pressure was 156/74, heart rate 56 beats per minute, body weight 160 pounds.   HEENT:  Eyes and ENT were unremarkable.   LUNGS:  Clear.   CARDIAC:  Rhythm was irregularly irregular.  The heart sounds were normal without murmur.   ABDOMEN:  Examination showed no hepatomegaly.   EXTREMITIES:  There was no pedal edema.      RADIOLOGIC STUDIES:  EKG showed atrial fibrillation with slow ventricular rate and right bundle branch block.      ASSESSMENT AND RECOMMENDATIONS:  Ms. Escobar has relatively slow ventricular rate in atrial fibrillation.  I have asked her to stop digoxin and metoprolol because of slow ventricular rate but continue current dose of the diltiazem.      Her blood pressure is elevated and the dose of spironolactone is changed from 12.5 mg to 25 mg once a day.  She may need further adjustment of her blood pressure medications.  Her Cardiology followup is scheduled for 1 year.      cc:   Xiomy Sherman MD    [facility]    [address] [place] [address]         LAKSHMI LATHAM MD             D: 2018   T: 2018   MT: JERRELL      Name:     SHAHRAM ESCOBAR   MRN:      [id number]        Account:      [id number]   :      1932           Service Date: 2018      Document: [id number]

## 2018-07-17 NOTE — MR AVS SNAPSHOT
After Visit Summary   7/17/2018    Tita Escobar    MRN: [id number]           Patient Information     Date Of Birth          9/23/1932        Visit Information        Provider Department      7/17/2018 12:45 PM Jaden Augustine MD [facility]        Today's Diagnoses     Essential hypertension    -  1    Chronic atrial fibrillation (H)           Follow-ups after your visit        Additional Services     Follow-Up with Cardiac Advanced Practice Provider           Follow-Up with Electrophysiologist                 Future tests that were ordered for you today     Open Future Orders        Priority Expected Expires Ordered    Follow-Up with Cardiac Advanced Practice Provider Routine 7/17/2019 11/29/2019 7/17/2018    Follow-Up with Electrophysiologist Routine 7/17/2020 11/29/2020 7/17/2018    EKG 12-lead complete w/read (Future)- to be scheduled Routine 7/17/2019 7/17/2019 7/17/2018            Who to contact     If you have questions or need follow up information about today's clinic visit or your schedule please contact [facility] directly at [phone].  Normal or non-critical lab and imaging results will be communicated to you by QuinStreethart, letter or phone within 4 business days after the clinic has received the results. If you do not hear from us within 7 days, please contact the clinic through DailyBootht or phone. If you have a critical or abnormal lab result, we will notify you by phone as soon as possible.  Submit refill requests through Leondra music or call your pharmacy and they will forward the refill request to us. Please allow 3 business days for your refill to be completed.          Additional Information About Your Visit        MyChart Information     [employer] lets you send messages to your doctor, view your test results, renew your prescriptions, schedule appointments and more. To sign up, go to [email or web address] . Click  "on \"Log in\" on the left side of the screen, which will take you to the Welcome page. Then click on \"Sign up Now\" on the right side of the page.     You will be asked to enter the access code listed below, as well as some personal information. Please follow the directions to create your username and password.     Your access code is: [email or web address]  Expires: 10/15/2018  1:03 PM     Your access code will  in 90 days. If you need help or a new code, please call your [facility] clinic or [phone].        Care EveryWhere ID     This is your [facility] EveryWhere ID. This could be used by other organizations to access your [facility] medical records  [id number]        Your Vitals Were     Pulse Height BMI (Body Mass Index)             56 1.568 m (5' 1.73\") 29.52 kg/m2          Blood Pressure from Last 3 Encounters:   18 156/74   17 146/89   16 140/84    Weight from Last 3 Encounters:   18 72.6 kg (160 lb)   17 79.7 kg (175 lb 9.6 oz)   16 81.2 kg (179 lb)              We Performed the Following     EKG 12-lead complete w/read - Clinics (performed today)     Follow-Up with Electrophysiologist          Today's Medication Changes          These changes are accurate as of 18  1:03 PM.  If you have any questions, ask your nurse or doctor.               These medicines have changed or have updated prescriptions.        Dose/Directions    spironolactone 25 MG tablet   Commonly known as:  ALDACTONE   This may have changed:    - how to take this  - additional instructions   Used for:  Essential hypertension   Changed by:  Jaden Augustine MD        Dose:  25 mg   Take 1 tablet (25 mg) by mouth daily   Quantity:  90 tablet   Refills:  3         Stop taking these medicines if you haven't already. Please contact your care team if you have questions.     digoxin 125 MCG tablet   Commonly known as:  LANOXIN   Stopped by:  Jaden Augustine MD           metoprolol succinate 25 MG 24 hr tablet   Commonly " known as:  TOPROL-XL   Stopped by:  Jaden Augustine MD                Where to get your medicines      These medications were sent to [facility] [place] - [place], [place] - [address]  [address], [place] [address]     Phone:  [phone]     spironolactone 25 MG tablet                Primary Care Provider Office Phone # Fax #    Xiomy Sherman [phone]       [facility] [address] [facility] [place] [address]        Equal Access to Services     [facility]: Hadii aad ku hadasho Soomaali, waaxda luqadaha, qaybta kaalmada adeegyada, waxay idiin hayaan adeholly davison . So [facility] [phone].    ATENCIÓN: Si habla español, tiene a walker disposición servicios gratuitos de asistencia lingüística. [facility] [phone].    We comply with applicable federal civil rights laws and [place] laws. We do not discriminate on the basis of race, color, national origin, age, disability, sex, sexual orientation, or gender identity.            Thank you!     Thank you for choosing [facility]  for your care. Our goal is always to provide you with excellent care. Hearing back from our patients is one way we can continue to improve our services. Please take a few minutes to complete the written survey that you may receive in the mail after your visit with us. Thank you!             Your Updated Medication List - Protect others around you: Learn how to safely use, store and throw away your medicines at www.disposemymeds.org.          This list is accurate as of 7/17/18  1:03 PM.  Always use your most recent med list.                   Brand Name Dispense Instructions for use Diagnosis    acetaminophen 325 MG tablet    TYLENOL     Take 325-650 mg by mouth every 6 hours as needed for mild pain        albuterol 108 (90 Base) MCG/ACT Inhaler    PROAIR HFA/PROVENTIL HFA/VENTOLIN HFA     Inhale 2 puffs into the lungs every 6 hours        ALLEGRA PO       Take 180 mg by mouth daily        ARTIFICIAL TEARS OP      Apply to eye as needed        dabigatran ANTICOAGULANT 150 MG capsule    PRADAXA ANTICOAGULANT    180 capsule    Take 1 capsule (150 mg) by mouth 2 times daily    Permanent atrial fibrillation (H)       diltiazem 180 MG 24 hr ER beaded capsule    TIAZAC    180 capsule    Take 2 capsules (360 mg) by mouth daily Two tabs daily in am        DIPHENHIST 25 MG capsule   Generic drug:  diphenhydrAMINE      Take 25 mg by mouth every 6 hours as needed for itching or allergies        FIBER CHOICE PO           GAS-X EXTRA STRENGTH PO      Take 80 mg by mouth as needed        prazosin 1 MG capsule    MINIPRESS    180 capsule    Take 1 capsule (1 mg) by mouth 2 times daily    Essential hypertension, benign       ramipril 10 MG capsule    ALTACE    180 capsule    Take 1 capsule (10 mg) by mouth 2 times daily    Essential hypertension, benign       Senna 8.6 MG Caps      Take 8.6 mg by mouth as needed        sertraline 25 MG tablet    ZOLOFT     Take 25 mg by mouth daily        spironolactone 25 MG tablet    ALDACTONE    90 tablet    Take 1 tablet (25 mg) by mouth daily    Essential hypertension

## 2018-07-17 NOTE — LETTER
7/17/2018    MINDY  ANDREA  [facility] [address]  [facility] [address]    RE: Tita Escobar       Dear Colleague,    I had the pleasure of seeing Tita Escobar in the [facility].    HPI and Plan:   See dictation    Orders Placed This Encounter   Procedures     Follow-Up with Cardiac Advanced Practice Provider     Follow-Up with Electrophysiologist     EKG 12-lead complete w/read - Clinics (performed today)     EKG 12-lead complete w/read (Future)- to be scheduled       Orders Placed This Encounter   Medications     Sennosides (SENNA) 8.6 MG CAPS     Sig: Take 8.6 mg by mouth as needed     Inulin (FIBER CHOICE PO)     Simethicone (GAS-X EXTRA STRENGTH PO)     Sig: Take 80 mg by mouth as needed     Hypromellose (ARTIFICIAL TEARS OP)     Sig: Apply to eye as needed     diphenhydrAMINE (DIPHENHIST) 25 MG capsule     Sig: Take 25 mg by mouth every 6 hours as needed for itching or allergies     acetaminophen (TYLENOL) 325 MG tablet     Sig: Take 325-650 mg by mouth every 6 hours as needed for mild pain     spironolactone (ALDACTONE) 25 MG tablet     Sig: Take 1 tablet (25 mg) by mouth daily     Dispense:  90 tablet     Refill:  3       Medications Discontinued During This Encounter   Medication Reason     digoxin (LANOXIN) 125 MCG tablet      metoprolol (TOPROL-XL) 25 MG 24 hr tablet      spironolactone (ALDACTONE) 25 MG tablet          Encounter Diagnoses   Name Primary?     Chronic atrial fibrillation (H)      Essential hypertension Yes       CURRENT MEDICATIONS:  Current Outpatient Prescriptions   Medication Sig Dispense Refill     acetaminophen (TYLENOL) 325 MG tablet Take 325-650 mg by mouth every 6 hours as needed for mild pain       albuterol (PROAIR HFA, PROVENTIL HFA, VENTOLIN HFA) 108 (90 BASE) MCG/ACT inhaler Inhale 2 puffs into the lungs every 6 hours       dabigatran ANTICOAGULANT (PRADAXA ANTICOAGULANT) 150 MG CAPS capsule Take 1 capsule (150 mg) by mouth  2 times daily 180 capsule 3     diltiazem (TIAZAC) 180 MG 24 hr ER beaded capsule Take 2 capsules (360 mg) by mouth daily Two tabs daily in am 180 capsule 3     diphenhydrAMINE (DIPHENHIST) 25 MG capsule Take 25 mg by mouth every 6 hours as needed for itching or allergies       Hypromellose (ARTIFICIAL TEARS OP) Apply to eye as needed       Inulin (FIBER CHOICE PO)        prazosin (MINIPRESS) 1 MG capsule Take 1 capsule (1 mg) by mouth 2 times daily 180 capsule 3     ramipril (ALTACE) 10 MG capsule Take 1 capsule (10 mg) by mouth 2 times daily 180 capsule 3     Sennosides (SENNA) 8.6 MG CAPS Take 8.6 mg by mouth as needed       sertraline (ZOLOFT) 25 MG tablet Take 25 mg by mouth daily       Simethicone (GAS-X EXTRA STRENGTH PO) Take 80 mg by mouth as needed       spironolactone (ALDACTONE) 25 MG tablet Take 1 tablet (25 mg) by mouth daily 90 tablet 3     Fexofenadine HCl (ALLEGRA PO) Take 180 mg by mouth daily       [DISCONTINUED] spironolactone (ALDACTONE) 25 MG tablet 25 mg by Oral or Feeding Tube route daily Take 1/2 tablet (12.5 MG) daily 30 tablet        ALLERGIES     Allergies   Allergen Reactions     Benicar [Olmesartan] Shortness Of Breath     Augmentin Diarrhea     Flecainide      Latex        PAST MEDICAL HISTORY:  Past Medical History:   Diagnosis Date     Asthma      CVA (cerebral vascular accident) (H) 2009     Hyperlipidaemia      Hypertension      Persistent atrial fibrillation (H)      Rupture of uterus 1957       PAST SURGICAL HISTORY:  Past Surgical History:   Procedure Laterality Date     HYSTEROSCOPY  2014    x 2      KNEE SURGERY  2014       FAMILY HISTORY:  Family History   Problem Relation Age of Onset     HEART DISEASE Father      Hypertension Father      Hypertension Sister        SOCIAL HISTORY:  Social History     Social History     Marital status:      Spouse name: N/A     Number of children: N/A     Years of education: N/A     Social History Main Topics     Smoking status:  "Former Smoker     Quit date: 1/1/1982     Smokeless tobacco: Never Used     Alcohol use Yes      Comment: occasional     Drug use: No     Sexual activity: Not Asked     Other Topics Concern     Caffeine Concern No     1-2 cups coffee in the morning     Sleep Concern No     Stress Concern Yes     states she always has been high stressed     Weight Concern Yes     Special Diet No     watches what she eats     Exercise Yes     45 min speed walking or jogging     Seat Belt Yes     Social History Narrative       Review of Systems:  Skin:  Positive for rash on face   Eyes:  Positive for glasses reading  ENT:  Negative   allergies   Respiratory:  not assessed cough     Cardiovascular:  Negative      Gastroenterology: Positive for heartburn;constipation    Genitourinary:  Negative hematuria    Musculoskeletal:  Negative      Neurologic:  Positive for stroke  (hx - 2000)  Psychiatric:  Positive for depression    Heme/Lymph/Imm:  Positive for weight loss;allergies    Endocrine:  Negative diabetes      Physical Exam:  Vitals: /74  Pulse 56  Ht 1.568 m (5' 1.73\")  Wt 72.6 kg (160 lb)  BMI 29.52 kg/m2    Constitutional:  cooperative, alert and oriented, well developed, well nourished, in no acute distress        Skin:  warm and dry to the touch, no apparent skin lesions or masses noted          Head:  normocephalic, no masses or lesions        Eyes:  conjunctivae and lids unremarkable        Lymph:      ENT:  no pallor or cyanosis, dentition good        Neck:  JVP normal        Respiratory:  normal breath sounds, clear to auscultation, normal A-P diameter, normal symmetry, normal respiratory excursion, no use of accessory muscles         Cardiac: no S3 or S4 irregularly irregular rhythm              not assessed this visit                                        GI:  abdomen soft obese      Extremities and Muscular Skeletal:  no deformities, clubbing, cyanosis, erythema observed;no edema stasis pigmentation 1+;bilateral " LE edema     fine tremor    Neurological:           Psych:           CC  Lakshmi Augustine MD  [address]  [address]  [place] [address]                Service Date: 2018      HISTORY OF PRESENT ILLNESS:  I saw Ms. Escobar for followup of atrial fibrillation and hypertension.  Since the last clinic visit about a year ago, she has not had an ER visit or hospitalization.  Symptomatically, she denies palpitation, fatigue or shortness of breath.  She has no chest pain.  She is using a walker for ambulation.      PHYSICAL EXAMINATION:   VITAL SIGNS:  Blood pressure was 156/74, heart rate 56 beats per minute, body weight 160 pounds.   HEENT:  Eyes and ENT were unremarkable.   LUNGS:  Clear.   CARDIAC:  Rhythm was irregularly irregular.  The heart sounds were normal without murmur.   ABDOMEN:  Examination showed no hepatomegaly.   EXTREMITIES:  There was no pedal edema.      RADIOLOGIC STUDIES:  EKG showed atrial fibrillation with slow ventricular rate and right bundle branch block.      ASSESSMENT AND RECOMMENDATIONS:  Ms. Escobar has relatively slow ventricular rate in atrial fibrillation.  I have asked her to stop digoxin and metoprolol because of slow ventricular rate but continue current dose of the diltiazem.      Her blood pressure is elevated and the dose of spironolactone is changed from 12.5 mg to 25 mg once a day.  She may need further adjustment of her blood pressure medications.  Her Cardiology followup is scheduled for 1 year.      cc:   Xiomy Sherman MD    [facility]    [address] [place] [address]         LAKSHMI AUGUSTINE MD             D: 2018   T: 2018   MT: DW      Name:     SHAHRAM ESCOBAR   MRN:      [id number]        Account:      [id number]   :      1932           Service Date: 2018      Document: [id number]        Thank you for allowing me to participate in the care of your patient.      Sincerely,     Lakshmi Augustine MD     [facility]  Care    cc:   Jaden Augustine MD  [address]  [address]  [place] [address]

## 2018-12-25 NOTE — ED AVS SNAPSHOT
Emergency Department  [address] [place] [address]  Phone:  [phone]  Fax:  [phone]                                    Tita Escobar   MRN: [id number]    Department:   Emergency Department   Date of Visit:  12/25/2018           After Visit Summary Signature Page    I have received my discharge instructions, and my questions have been answered. I have discussed any challenges I see with this plan with the nurse or doctor.    ..........................................................................................................................................  Patient/Patient Representative Signature      ..........................................................................................................................................  Patient Representative Print Name and Relationship to Patient    ..................................................               ................................................  Date                                   Time    ..........................................................................................................................................  Reviewed by Signature/Title    ...................................................              ..............................................  Date                                               Time          22EPIC Rev 08/18

## 2018-12-25 NOTE — ED NOTES
Bed: ED19  Expected date:   Expected time:   Means of arrival:   Comments:  Katia [address] Syncope 86 female

## 2018-12-25 NOTE — ED PROVIDER NOTES
"  History     Chief Complaint:  Loss of consciousness     HPI   Tita Escobar is a 86 year old female with history of atrial fibrillation, hypertension, hyperlipidemia, CVA, asthma, who presents for evaluation after syncopal episode while eating lunch this afternoon around 13:30. The patient was seated at the dinner table for lunch when her family noticed her slump over, drooling, with shallow breathing, which lasted about 3-5 minutes. She regained consciousness gradually and stated she was \"starving.\" She has two family members who are medical doctors, who brought her in for evaluation. The patient states she has not eaten much of late as she has been at holiday parties where she is sitting most of the time. Family state she did have a small English and some punch around 11:00 this morning, but she did not eat breakfast before that at [facility]. She does not have a history of syncope, but has had episodes of lightheadedness while at the spa, per family. The patient has a history of atrial fibrillation and is anticoagulated on Pradaxa. She is primarily followed by her cardiologist for her primary care. There was no seizure activity and she was not vomiting. She denies numbness/tingling or weakness. No chest pain, shortness of breath, or leg swelling.       Allergies:  Benicar [Olmesartan]  Augmentin  Flecainide    Medications:    albuterol inhaler  dabigatran (PRADAXA ANTICOAGULANT) 150 MG CAPS capsule  diltiazem (TIAZAC) 180 MG 24 hr ER beaded capsule  diphenhydrAMINE (DIPHENHIST) 25 MG capsule  Fexofenadine HCl (ALLEGRA PO)  Inulin (FIBER CHOICE PO)  prazosin (MINIPRESS) 1 MG capsule  ramipril (ALTACE) 10 MG capsule  Sennosides (SENNA) 8.6 MG CAPS  sertraline (ZOLOFT) 25 MG tablet  Simethicone (GAS-X EXTRA STRENGTH PO)  spironolactone (ALDACTONE) 25 MG tablet    Past Medical History:    Asthma   CVA  Hyperlipidemia   Hypertension   Persistent atrial fibrillation   Rupture of uterus   CKD  Dissection " carotid artery   Heart disease   Pancreatic gallstone  Peptic ulcers     Past Surgical History:    Hysteroscopy x2  Knee surgery - right   Appendectomy   Cholecystectomy  Lysis of adhesions   Right knee arthroscopy     Family History:    Heart disease - father   Hypertension - father, sister     Social History:  Patient presents with family.  Smoking Status: former, quit 37 years ago  Smokeless Tobacco: never  Alcohol Use: yes  Drug Use: no    Marital Status:   [4]     Review of Systems   Respiratory: Negative for shortness of breath.    Cardiovascular: Negative for chest pain and leg swelling.   Gastrointestinal: Negative for vomiting.   Neurological: Positive for syncope. Negative for seizures, weakness and numbness.   10 point review of systems performed and is negative except as above and in HPI.     Physical Exam     Patient Vitals for the past 24 hrs:   BP Temp Temp src Pulse Heart Rate Resp SpO2   12/25/18 1600 -- -- -- -- 95 16 100 %   12/25/18 1450 -- -- -- -- -- -- 98 %   12/25/18 1448 115/78 97.7  F (36.5  C) Oral 101 101 18 --      Physical Exam  General: Resting on the gurney, appears comfortable  Head:  The scalp, face, and head appear normal  Mouth/Throat: Mucus membranes are moist  CV:  Irregularly irregular rate    Normal S1 and S2  No pathological murmur   Resp:  Breath sounds clear and equal bilaterally    Non-labored, no retractions or accessory muscle use    No coarseness    No wheezing   GI:  Abdomen is soft, no rigidity    No tenderness to palpation  MS:  Normal motor assessment of all extremities.    Good capillary refill noted.    No lower extremity edema, redness, or excess warmth  Skin:  No rash or lesions noted.  Neuro: Cranial nerves II-XII intact by examination.    Strength and sensation intact x4     Speech is normal and fluent. No apparent deficit.  Psych: Awake. Alert.  Normal affect.      Appropriate interactions.       Emergency Department Course     ECG:  ECG taken at  1451, ECG read at 1455  Atrial fibrillation   Right BBB  T wave abnormality, consider inferolateral ischemia   Abnormal ECG  Rate 87 bpm. PA interval *. QRS duration 140. QT/QTc 398/478. P-R-T axes *,98,-41.     Laboratory:  Laboratory findings were communicated with the patient and family who voiced understanding of the findings.    CBC: WBC 7.4, HGB 13.0,   CMP: Na 130 (L), GFR 52 (L), o/w WNL (Creatinine 0.99)  Troponin (Collected 1455): <0.015       Emergency Department Course:  Nursing notes and vitals reviewed.  I entered the room.  I performed an exam of the patient as documented above.     EKG obtained in the ED, see results above.      IV was inserted and blood was drawn for laboratory testing, results above.    1600 the patient's orthostatics were checked.     1717 the patient was rechecked and updated the patient and family regarding the results of the laboratory studies.      I discussed the treatment plan with the patient and family. They expressed understanding of this plan and consented to discharge. They will be discharged home with instructions for care and follow up. In addition, the patient will return to the emergency department if their symptoms worsen, if new symptoms arise or if there is any concern.  All questions were answered.     Impression & Plan      Medical Decision Making:  Tita sEcobar is a 86 year old female who presents with a history and clinical exam consistent with syncope. While dehydration was the most likely etiology given the history of this patient, I considered a broad differential for their syncope today including cardiac arrhythmia, ACS, aortic stenosis, PE, orthostatic hypotension, drugs, situational, carotid hypersensitivity, seizure, TIA, stroke, vasovagal.     There are no signs of a concerning etiology for syncope at this point. In addition, there is no family history of sudden death, no chest pain, no seizure activity or post-ictal period, no murmur, and no  signs of orthostasis in the ED, no focal neurologic symptoms, and no complaints of concerning headache. The workup in the ED is negative and the physical exam is re-assuring.     The risk of potential arhythmia discussed at length with patient and family. They understand the inherent risk of discharge in the elderly with syncope and known arrhythmia and are comfortable with follow up as an outpatient. She will  a Holter monitor in the upcoming days.       Diagnosis:    ICD-10-CM    1. Syncope, unspecified syncope type R55 Holter Monitor 48 hour Adult Pediatric     Disposition:   The patient was discharged to home.    Discharge Medications:  There are no discharge medications for this patient.     Scribe Disclosure:  Ariel NICOLE, am serving as a scribe at 3:05 PM on 12/25/2018 to document services personally performed by Maine Stuart MD, based on my observations and the provider's statements to me.   EMERGENCY DEPARTMENT       Maine Stuart MD  01/02/19 [id number]

## 2018-12-26 NOTE — TELEPHONE ENCOUNTER
Pt daughter Michael called and stated that pt went to ER after a syncopal episode and was thought to have had a TIA.  Pt recovered quickly and actually did not want to go to ER, because she was hungry.  ER MD ordered a Holter to be worn and to call Dr Augustine office.  Pt daughter states that pt is at her care facility and they are watching pt and also daughter will be checking on pt also.  She feels that pt is fine, but felt that she must call.  Explained that Dr Augustine and Radha are out of the office until Monday, when Dr Augustine is back.  Pt has perm A Fib and EKG shows A Fib rate 87 bpm. Will message Dr Augustine to make aware and to see if there are any recommendations. Stepan

## 2018-12-31 NOTE — TELEPHONE ENCOUNTER
Talk to pt about the monitor who is not wanting to proceed at this time with the monitor as she states that she feels fine and that she did not really pass out, but sat down in a chair and closed her eyes.  Pt currently resides at [facility] in [place] ans states that she is monitored daily and feels that at this time she does not need to wear a monitor.  Pt has a f/u ordered in July with Radha Reddy. LisaRN

## 2019-01-01 ENCOUNTER — APPOINTMENT (OUTPATIENT)
Dept: CARDIOLOGY | Facility: CLINIC | Age: 84
DRG: 329 | End: 2019-01-01
Payer: COMMERCIAL

## 2019-01-01 ENCOUNTER — APPOINTMENT (OUTPATIENT)
Dept: GENERAL RADIOLOGY | Facility: CLINIC | Age: 84
DRG: 329 | End: 2019-01-01
Payer: COMMERCIAL

## 2019-01-01 ENCOUNTER — APPOINTMENT (OUTPATIENT)
Dept: OCCUPATIONAL THERAPY | Facility: CLINIC | Age: 84
DRG: 329 | End: 2019-01-01
Payer: COMMERCIAL

## 2019-01-01 ENCOUNTER — APPOINTMENT (OUTPATIENT)
Dept: PHYSICAL THERAPY | Facility: CLINIC | Age: 84
DRG: 329 | End: 2019-01-01
Payer: COMMERCIAL

## 2019-01-01 ENCOUNTER — APPOINTMENT (OUTPATIENT)
Dept: CT IMAGING | Facility: CLINIC | Age: 84
DRG: 329 | End: 2019-01-01
Payer: COMMERCIAL

## 2019-01-01 ENCOUNTER — ANESTHESIA (OUTPATIENT)
Dept: SURGERY | Facility: CLINIC | Age: 84
DRG: 329 | End: 2019-01-01
Payer: COMMERCIAL

## 2019-01-01 ENCOUNTER — ANESTHESIA EVENT (OUTPATIENT)
Dept: SURGERY | Facility: CLINIC | Age: 84
DRG: 329 | End: 2019-01-01
Payer: COMMERCIAL

## 2019-01-01 ENCOUNTER — HOSPITAL ENCOUNTER (INPATIENT)
Facility: CLINIC | Age: 84
LOS: 10 days | DRG: 329 | End: 2019-01-16
Attending: EMERGENCY MEDICINE | Admitting: INTERNAL MEDICINE
Payer: COMMERCIAL

## 2019-01-01 VITALS
OXYGEN SATURATION: 94 % | RESPIRATION RATE: 20 BRPM | WEIGHT: 176.37 LBS | BODY MASS INDEX: 32.46 KG/M2 | SYSTOLIC BLOOD PRESSURE: 163 MMHG | DIASTOLIC BLOOD PRESSURE: 77 MMHG | TEMPERATURE: 98.2 F | HEART RATE: 93 BPM | HEIGHT: 62 IN

## 2019-01-01 DIAGNOSIS — A41.9 SEVERE SEPSIS (H): ICD-10-CM

## 2019-01-01 DIAGNOSIS — R65.20 SEVERE SEPSIS (H): ICD-10-CM

## 2019-01-01 DIAGNOSIS — K52.9 COLITIS: ICD-10-CM

## 2019-01-01 DIAGNOSIS — I48.91 ATRIAL FIBRILLATION WITH RVR (H): ICD-10-CM

## 2019-01-01 LAB
ABO + RH BLD: NORMAL
ALBUMIN SERPL-MCNC: 2 G/DL (ref 3.4–5)
ALBUMIN SERPL-MCNC: 2.2 G/DL (ref 3.4–5)
ALBUMIN SERPL-MCNC: 3.2 G/DL (ref 3.4–5)
ALP SERPL-CCNC: 47 U/L (ref 40–150)
ALP SERPL-CCNC: 57 U/L (ref 40–150)
ALP SERPL-CCNC: 68 U/L (ref 40–150)
ALT SERPL W P-5'-P-CCNC: 21 U/L (ref 0–50)
ALT SERPL W P-5'-P-CCNC: 25 U/L (ref 0–50)
ALT SERPL W P-5'-P-CCNC: 37 U/L (ref 0–50)
ANION GAP SERPL CALCULATED.3IONS-SCNC: 10 MMOL/L (ref 3–14)
ANION GAP SERPL CALCULATED.3IONS-SCNC: 2 MMOL/L (ref 3–14)
ANION GAP SERPL CALCULATED.3IONS-SCNC: 4 MMOL/L (ref 3–14)
ANION GAP SERPL CALCULATED.3IONS-SCNC: 4 MMOL/L (ref 3–14)
ANION GAP SERPL CALCULATED.3IONS-SCNC: 5 MMOL/L (ref 3–14)
ANION GAP SERPL CALCULATED.3IONS-SCNC: 6 MMOL/L (ref 3–14)
ANION GAP SERPL CALCULATED.3IONS-SCNC: 6 MMOL/L (ref 3–14)
ANION GAP SERPL CALCULATED.3IONS-SCNC: 7 MMOL/L (ref 3–14)
ANION GAP SERPL CALCULATED.3IONS-SCNC: 7 MMOL/L (ref 3–14)
ANION GAP SERPL CALCULATED.3IONS-SCNC: 8 MMOL/L (ref 3–14)
ANION GAP SERPL CALCULATED.3IONS-SCNC: 8 MMOL/L (ref 3–14)
APTT PPP: 45 SEC (ref 22–37)
APTT PPP: 98 SEC (ref 22–37)
AST SERPL W P-5'-P-CCNC: 17 U/L (ref 0–45)
AST SERPL W P-5'-P-CCNC: 24 U/L (ref 0–45)
AST SERPL W P-5'-P-CCNC: 30 U/L (ref 0–45)
BACTERIA SPEC CULT: NO GROWTH
BACTERIA SPEC CULT: NORMAL
BACTERIA SPEC CULT: NORMAL
BASE DEFICIT BLDA-SCNC: 0.5 MMOL/L
BASOPHILS # BLD AUTO: 0 10E9/L (ref 0–0.2)
BASOPHILS NFR BLD AUTO: 0 %
BASOPHILS NFR BLD AUTO: 0.1 %
BASOPHILS NFR BLD AUTO: 0.2 %
BILIRUB SERPL-MCNC: 0.4 MG/DL (ref 0.2–1.3)
BILIRUB SERPL-MCNC: 0.5 MG/DL (ref 0.2–1.3)
BILIRUB SERPL-MCNC: 0.8 MG/DL (ref 0.2–1.3)
BLD GP AB SCN SERPL QL: NORMAL
BLD GP AB SCN SERPL QL: NORMAL
BLD PROD TYP BPU: NORMAL
BLD PROD TYP BPU: NORMAL
BLD UNIT ID BPU: 0
BLOOD BANK CMNT PATIENT-IMP: NORMAL
BLOOD BANK CMNT PATIENT-IMP: NORMAL
BLOOD PRODUCT CODE: NORMAL
BPU ID: NORMAL
BUN SERPL-MCNC: 13 MG/DL (ref 7–30)
BUN SERPL-MCNC: 14 MG/DL (ref 7–30)
BUN SERPL-MCNC: 17 MG/DL (ref 7–30)
BUN SERPL-MCNC: 19 MG/DL (ref 7–30)
BUN SERPL-MCNC: 19 MG/DL (ref 7–30)
BUN SERPL-MCNC: 20 MG/DL (ref 7–30)
BUN SERPL-MCNC: 20 MG/DL (ref 7–30)
BUN SERPL-MCNC: 21 MG/DL (ref 7–30)
BUN SERPL-MCNC: 24 MG/DL (ref 7–30)
BUN SERPL-MCNC: 24 MG/DL (ref 7–30)
BUN SERPL-MCNC: 27 MG/DL (ref 7–30)
CA-I BLD-MCNC: 4.2 MG/DL (ref 4.4–5.2)
CALCIUM SERPL-MCNC: 7.3 MG/DL (ref 8.5–10.1)
CALCIUM SERPL-MCNC: 7.3 MG/DL (ref 8.5–10.1)
CALCIUM SERPL-MCNC: 7.6 MG/DL (ref 8.5–10.1)
CALCIUM SERPL-MCNC: 7.6 MG/DL (ref 8.5–10.1)
CALCIUM SERPL-MCNC: 7.7 MG/DL (ref 8.5–10.1)
CALCIUM SERPL-MCNC: 7.8 MG/DL (ref 8.5–10.1)
CALCIUM SERPL-MCNC: 7.8 MG/DL (ref 8.5–10.1)
CALCIUM SERPL-MCNC: 8 MG/DL (ref 8.5–10.1)
CALCIUM SERPL-MCNC: 8 MG/DL (ref 8.5–10.1)
CALCIUM SERPL-MCNC: 8.2 MG/DL (ref 8.5–10.1)
CALCIUM SERPL-MCNC: 9.1 MG/DL (ref 8.5–10.1)
CHLORIDE SERPL-SCNC: 100 MMOL/L (ref 94–109)
CHLORIDE SERPL-SCNC: 101 MMOL/L (ref 94–109)
CHLORIDE SERPL-SCNC: 102 MMOL/L (ref 94–109)
CHLORIDE SERPL-SCNC: 102 MMOL/L (ref 94–109)
CHLORIDE SERPL-SCNC: 103 MMOL/L (ref 94–109)
CHLORIDE SERPL-SCNC: 91 MMOL/L (ref 94–109)
CHLORIDE SERPL-SCNC: 97 MMOL/L (ref 94–109)
CHLORIDE SERPL-SCNC: 98 MMOL/L (ref 94–109)
CHLORIDE SERPL-SCNC: 99 MMOL/L (ref 94–109)
CO2 SERPL-SCNC: 24 MMOL/L (ref 20–32)
CO2 SERPL-SCNC: 25 MMOL/L (ref 20–32)
CO2 SERPL-SCNC: 26 MMOL/L (ref 20–32)
CO2 SERPL-SCNC: 26 MMOL/L (ref 20–32)
CO2 SERPL-SCNC: 27 MMOL/L (ref 20–32)
CO2 SERPL-SCNC: 28 MMOL/L (ref 20–32)
CO2 SERPL-SCNC: 28 MMOL/L (ref 20–32)
CO2 SERPL-SCNC: 29 MMOL/L (ref 20–32)
CO2 SERPL-SCNC: 30 MMOL/L (ref 20–32)
CO2 SERPL-SCNC: 31 MMOL/L (ref 20–32)
CO2 SERPL-SCNC: 35 MMOL/L (ref 20–32)
COPATH REPORT: NORMAL
CREAT SERPL-MCNC: 0.62 MG/DL (ref 0.52–1.04)
CREAT SERPL-MCNC: 0.64 MG/DL (ref 0.52–1.04)
CREAT SERPL-MCNC: 0.67 MG/DL (ref 0.52–1.04)
CREAT SERPL-MCNC: 0.7 MG/DL (ref 0.52–1.04)
CREAT SERPL-MCNC: 0.74 MG/DL (ref 0.52–1.04)
CREAT SERPL-MCNC: 0.76 MG/DL (ref 0.52–1.04)
CREAT SERPL-MCNC: 0.81 MG/DL (ref 0.52–1.04)
CREAT SERPL-MCNC: 0.88 MG/DL (ref 0.52–1.04)
CREAT SERPL-MCNC: 0.9 MG/DL (ref 0.52–1.04)
CREAT SERPL-MCNC: 0.99 MG/DL (ref 0.52–1.04)
CREAT SERPL-MCNC: 1.16 MG/DL (ref 0.52–1.04)
DIFFERENTIAL METHOD BLD: ABNORMAL
EOSINOPHIL # BLD AUTO: 0 10E9/L (ref 0–0.7)
EOSINOPHIL # BLD AUTO: 0.1 10E9/L (ref 0–0.7)
EOSINOPHIL # BLD AUTO: 0.2 10E9/L (ref 0–0.7)
EOSINOPHIL # BLD AUTO: 0.2 10E9/L (ref 0–0.7)
EOSINOPHIL NFR BLD AUTO: 0 %
EOSINOPHIL NFR BLD AUTO: 0.1 %
EOSINOPHIL NFR BLD AUTO: 0.6 %
EOSINOPHIL NFR BLD AUTO: 0.8 %
EOSINOPHIL NFR BLD AUTO: 1.2 %
EOSINOPHIL NFR BLD AUTO: 2.1 %
EOSINOPHIL NFR BLD AUTO: 2.2 %
ERYTHROCYTE [DISTWIDTH] IN BLOOD BY AUTOMATED COUNT: 13 % (ref 10–15)
ERYTHROCYTE [DISTWIDTH] IN BLOOD BY AUTOMATED COUNT: 13.1 % (ref 10–15)
ERYTHROCYTE [DISTWIDTH] IN BLOOD BY AUTOMATED COUNT: 13.2 % (ref 10–15)
ERYTHROCYTE [DISTWIDTH] IN BLOOD BY AUTOMATED COUNT: 13.3 % (ref 10–15)
ERYTHROCYTE [DISTWIDTH] IN BLOOD BY AUTOMATED COUNT: 13.3 % (ref 10–15)
ERYTHROCYTE [DISTWIDTH] IN BLOOD BY AUTOMATED COUNT: 13.4 % (ref 10–15)
ERYTHROCYTE [DISTWIDTH] IN BLOOD BY AUTOMATED COUNT: 13.4 % (ref 10–15)
ERYTHROCYTE [DISTWIDTH] IN BLOOD BY AUTOMATED COUNT: 13.5 % (ref 10–15)
ERYTHROCYTE [DISTWIDTH] IN BLOOD BY AUTOMATED COUNT: 13.5 % (ref 10–15)
ERYTHROCYTE [DISTWIDTH] IN BLOOD BY AUTOMATED COUNT: 13.8 % (ref 10–15)
GFR SERPL CREATININE-BSD FRML MDRD: 43 ML/MIN/{1.73_M2}
GFR SERPL CREATININE-BSD FRML MDRD: 52 ML/MIN/{1.73_M2}
GFR SERPL CREATININE-BSD FRML MDRD: 58 ML/MIN/{1.73_M2}
GFR SERPL CREATININE-BSD FRML MDRD: 59 ML/MIN/{1.73_M2}
GFR SERPL CREATININE-BSD FRML MDRD: 66 ML/MIN/{1.73_M2}
GFR SERPL CREATININE-BSD FRML MDRD: 71 ML/MIN/{1.73_M2}
GFR SERPL CREATININE-BSD FRML MDRD: 73 ML/MIN/{1.73_M2}
GFR SERPL CREATININE-BSD FRML MDRD: 78 ML/MIN/{1.73_M2}
GFR SERPL CREATININE-BSD FRML MDRD: 79 ML/MIN/{1.73_M2}
GFR SERPL CREATININE-BSD FRML MDRD: 81 ML/MIN/{1.73_M2}
GFR SERPL CREATININE-BSD FRML MDRD: 82 ML/MIN/{1.73_M2}
GLUCOSE BLDC GLUCOMTR-MCNC: 102 MG/DL (ref 70–99)
GLUCOSE BLDC GLUCOMTR-MCNC: 115 MG/DL (ref 70–99)
GLUCOSE BLDC GLUCOMTR-MCNC: 88 MG/DL (ref 70–99)
GLUCOSE BLDC GLUCOMTR-MCNC: 89 MG/DL (ref 70–99)
GLUCOSE SERPL-MCNC: 101 MG/DL (ref 70–99)
GLUCOSE SERPL-MCNC: 108 MG/DL (ref 70–99)
GLUCOSE SERPL-MCNC: 110 MG/DL (ref 70–99)
GLUCOSE SERPL-MCNC: 118 MG/DL (ref 70–99)
GLUCOSE SERPL-MCNC: 125 MG/DL (ref 70–99)
GLUCOSE SERPL-MCNC: 68 MG/DL (ref 70–99)
GLUCOSE SERPL-MCNC: 71 MG/DL (ref 70–99)
GLUCOSE SERPL-MCNC: 74 MG/DL (ref 70–99)
GLUCOSE SERPL-MCNC: 83 MG/DL (ref 70–99)
GLUCOSE SERPL-MCNC: 87 MG/DL (ref 70–99)
GLUCOSE SERPL-MCNC: 95 MG/DL (ref 70–99)
GRAM STN SPEC: NORMAL
HCO3 BLD-SCNC: 26 MMOL/L (ref 21–28)
HCT VFR BLD AUTO: 20.2 % (ref 35–47)
HCT VFR BLD AUTO: 23.8 % (ref 35–47)
HCT VFR BLD AUTO: 24.4 % (ref 35–47)
HCT VFR BLD AUTO: 24.8 % (ref 35–47)
HCT VFR BLD AUTO: 26.2 % (ref 35–47)
HCT VFR BLD AUTO: 26.5 % (ref 35–47)
HCT VFR BLD AUTO: 29.6 % (ref 35–47)
HCT VFR BLD AUTO: 34.8 % (ref 35–47)
HCT VFR BLD AUTO: 37.3 % (ref 35–47)
HCT VFR BLD AUTO: 39.3 % (ref 35–47)
HEMOCCULT STL QL: POSITIVE
HGB BLD-MCNC: 12 G/DL (ref 11.7–15.7)
HGB BLD-MCNC: 12.7 G/DL (ref 11.7–15.7)
HGB BLD-MCNC: 14.1 G/DL (ref 11.7–15.7)
HGB BLD-MCNC: 6.7 G/DL (ref 11.7–15.7)
HGB BLD-MCNC: 8 G/DL (ref 11.7–15.7)
HGB BLD-MCNC: 8.3 G/DL (ref 11.7–15.7)
HGB BLD-MCNC: 8.4 G/DL (ref 11.7–15.7)
HGB BLD-MCNC: 8.7 G/DL (ref 11.7–15.7)
HGB BLD-MCNC: 8.7 G/DL (ref 11.7–15.7)
HGB BLD-MCNC: 8.8 G/DL (ref 11.7–15.7)
HGB BLD-MCNC: 9 G/DL (ref 11.7–15.7)
HGB BLD-MCNC: 9.1 G/DL (ref 11.7–15.7)
HGB BLD-MCNC: 9.1 G/DL (ref 11.7–15.7)
HGB BLD-MCNC: 9.2 G/DL (ref 11.7–15.7)
HGB BLD-MCNC: 9.4 G/DL (ref 11.7–15.7)
HGB BLD-MCNC: 9.8 G/DL (ref 11.7–15.7)
IMM GRANULOCYTES # BLD: 0 10E9/L (ref 0–0.4)
IMM GRANULOCYTES # BLD: 0.1 10E9/L (ref 0–0.4)
IMM GRANULOCYTES # BLD: 0.2 10E9/L (ref 0–0.4)
IMM GRANULOCYTES NFR BLD: 0.1 %
IMM GRANULOCYTES NFR BLD: 0.3 %
IMM GRANULOCYTES NFR BLD: 0.5 %
IMM GRANULOCYTES NFR BLD: 0.5 %
IMM GRANULOCYTES NFR BLD: 0.6 %
IMM GRANULOCYTES NFR BLD: 0.6 %
IMM GRANULOCYTES NFR BLD: 0.7 %
IMM GRANULOCYTES NFR BLD: 1.1 %
IMM GRANULOCYTES NFR BLD: 1.1 %
INR PPP: 1.54 (ref 0.86–1.14)
INR PPP: 2.72 (ref 0.86–1.14)
INTERPRETATION ECG - MUSE: NORMAL
IRON SATN MFR SERPL: 34 % (ref 15–46)
IRON SERPL-MCNC: 56 UG/DL (ref 35–180)
LACTATE BLD-SCNC: 0.5 MMOL/L (ref 0.7–2)
LACTATE BLD-SCNC: 0.5 MMOL/L (ref 0.7–2)
LACTATE BLD-SCNC: 0.8 MMOL/L (ref 0.7–2)
LACTATE BLD-SCNC: 0.8 MMOL/L (ref 0.7–2)
LACTATE BLD-SCNC: 1.5 MMOL/L (ref 0.7–2)
LACTATE SERPL-SCNC: 1.6 MMOL/L (ref 0.4–2)
LACTATE SERPL-SCNC: 3.2 MMOL/L (ref 0.4–2)
LIPASE SERPL-CCNC: 63 U/L (ref 73–393)
LYMPHOCYTES # BLD AUTO: 0.9 10E9/L (ref 0.8–5.3)
LYMPHOCYTES # BLD AUTO: 1 10E9/L (ref 0.8–5.3)
LYMPHOCYTES # BLD AUTO: 1.2 10E9/L (ref 0.8–5.3)
LYMPHOCYTES # BLD AUTO: 1.5 10E9/L (ref 0.8–5.3)
LYMPHOCYTES # BLD AUTO: 1.7 10E9/L (ref 0.8–5.3)
LYMPHOCYTES # BLD AUTO: 1.8 10E9/L (ref 0.8–5.3)
LYMPHOCYTES # BLD AUTO: 2 10E9/L (ref 0.8–5.3)
LYMPHOCYTES NFR BLD AUTO: 11.8 %
LYMPHOCYTES NFR BLD AUTO: 15.8 %
LYMPHOCYTES NFR BLD AUTO: 17.6 %
LYMPHOCYTES NFR BLD AUTO: 18 %
LYMPHOCYTES NFR BLD AUTO: 26 %
LYMPHOCYTES NFR BLD AUTO: 4.2 %
LYMPHOCYTES NFR BLD AUTO: 4.7 %
LYMPHOCYTES NFR BLD AUTO: 6.4 %
LYMPHOCYTES NFR BLD AUTO: 9.2 %
Lab: NORMAL
MAGNESIUM SERPL-MCNC: 1.5 MG/DL (ref 1.6–2.3)
MAGNESIUM SERPL-MCNC: 1.7 MG/DL (ref 1.6–2.3)
MAGNESIUM SERPL-MCNC: 2.2 MG/DL (ref 1.6–2.3)
MCH RBC QN AUTO: 30.5 PG (ref 26.5–33)
MCH RBC QN AUTO: 30.6 PG (ref 26.5–33)
MCH RBC QN AUTO: 30.8 PG (ref 26.5–33)
MCH RBC QN AUTO: 30.9 PG (ref 26.5–33)
MCH RBC QN AUTO: 31 PG (ref 26.5–33)
MCH RBC QN AUTO: 31.5 PG (ref 26.5–33)
MCH RBC QN AUTO: 31.5 PG (ref 26.5–33)
MCH RBC QN AUTO: 31.6 PG (ref 26.5–33)
MCHC RBC AUTO-ENTMCNC: 33.1 G/DL (ref 31.5–36.5)
MCHC RBC AUTO-ENTMCNC: 33.2 G/DL (ref 31.5–36.5)
MCHC RBC AUTO-ENTMCNC: 33.6 G/DL (ref 31.5–36.5)
MCHC RBC AUTO-ENTMCNC: 33.9 G/DL (ref 31.5–36.5)
MCHC RBC AUTO-ENTMCNC: 34 G/DL (ref 31.5–36.5)
MCHC RBC AUTO-ENTMCNC: 34 G/DL (ref 31.5–36.5)
MCHC RBC AUTO-ENTMCNC: 34.3 G/DL (ref 31.5–36.5)
MCHC RBC AUTO-ENTMCNC: 34.4 G/DL (ref 31.5–36.5)
MCHC RBC AUTO-ENTMCNC: 34.5 G/DL (ref 31.5–36.5)
MCHC RBC AUTO-ENTMCNC: 35.9 G/DL (ref 31.5–36.5)
MCV RBC AUTO: 88 FL (ref 78–100)
MCV RBC AUTO: 90 FL (ref 78–100)
MCV RBC AUTO: 90 FL (ref 78–100)
MCV RBC AUTO: 91 FL (ref 78–100)
MCV RBC AUTO: 91 FL (ref 78–100)
MCV RBC AUTO: 92 FL (ref 78–100)
MCV RBC AUTO: 93 FL (ref 78–100)
MONOCYTES # BLD AUTO: 0.7 10E9/L (ref 0–1.3)
MONOCYTES # BLD AUTO: 1.2 10E9/L (ref 0–1.3)
MONOCYTES # BLD AUTO: 1.2 10E9/L (ref 0–1.3)
MONOCYTES # BLD AUTO: 1.3 10E9/L (ref 0–1.3)
MONOCYTES # BLD AUTO: 1.4 10E9/L (ref 0–1.3)
MONOCYTES # BLD AUTO: 1.5 10E9/L (ref 0–1.3)
MONOCYTES # BLD AUTO: 1.5 10E9/L (ref 0–1.3)
MONOCYTES # BLD AUTO: 1.7 10E9/L (ref 0–1.3)
MONOCYTES # BLD AUTO: 1.8 10E9/L (ref 0–1.3)
MONOCYTES NFR BLD AUTO: 12.3 %
MONOCYTES NFR BLD AUTO: 15.1 %
MONOCYTES NFR BLD AUTO: 15.6 %
MONOCYTES NFR BLD AUTO: 16.6 %
MONOCYTES NFR BLD AUTO: 16.9 %
MONOCYTES NFR BLD AUTO: 4.3 %
MONOCYTES NFR BLD AUTO: 5.5 %
MONOCYTES NFR BLD AUTO: 5.6 %
MONOCYTES NFR BLD AUTO: 9.6 %
MRSA DNA SPEC QL NAA+PROBE: NEGATIVE
NEUTROPHILS # BLD AUTO: 12.3 10E9/L (ref 1.6–8.3)
NEUTROPHILS # BLD AUTO: 13.6 10E9/L (ref 1.6–8.3)
NEUTROPHILS # BLD AUTO: 20.7 10E9/L (ref 1.6–8.3)
NEUTROPHILS # BLD AUTO: 28.9 10E9/L (ref 1.6–8.3)
NEUTROPHILS # BLD AUTO: 4.3 10E9/L (ref 1.6–8.3)
NEUTROPHILS # BLD AUTO: 5.9 10E9/L (ref 1.6–8.3)
NEUTROPHILS # BLD AUTO: 6.3 10E9/L (ref 1.6–8.3)
NEUTROPHILS # BLD AUTO: 6.6 10E9/L (ref 1.6–8.3)
NEUTROPHILS # BLD AUTO: 7 10E9/L (ref 1.6–8.3)
NEUTROPHILS NFR BLD AUTO: 55.6 %
NEUTROPHILS NFR BLD AUTO: 63.4 %
NEUTROPHILS NFR BLD AUTO: 64.2 %
NEUTROPHILS NFR BLD AUTO: 69 %
NEUTROPHILS NFR BLD AUTO: 71 %
NEUTROPHILS NFR BLD AUTO: 83.6 %
NEUTROPHILS NFR BLD AUTO: 85.8 %
NEUTROPHILS NFR BLD AUTO: 89 %
NEUTROPHILS NFR BLD AUTO: 89.6 %
NRBC # BLD AUTO: 0 10*3/UL
NRBC BLD AUTO-RTO: 0 /100
NT-PROBNP SERPL-MCNC: 2456 PG/ML (ref 0–1800)
NUM BPU REQUESTED: 1
O2/TOTAL GAS SETTING VFR VENT: ABNORMAL %
OVALOCYTES BLD QL SMEAR: SLIGHT
OXYHGB MFR BLD: 99 % (ref 92–100)
PCO2 BLD: 47 MM HG (ref 35–45)
PH BLD: 7.34 PH (ref 7.35–7.45)
PHOSPHATE SERPL-MCNC: 3.5 MG/DL (ref 2.5–4.5)
PLATELET # BLD AUTO: 197 10E9/L (ref 150–450)
PLATELET # BLD AUTO: 204 10E9/L (ref 150–450)
PLATELET # BLD AUTO: 214 10E9/L (ref 150–450)
PLATELET # BLD AUTO: 216 10E9/L (ref 150–450)
PLATELET # BLD AUTO: 219 10E9/L (ref 150–450)
PLATELET # BLD AUTO: 245 10E9/L (ref 150–450)
PLATELET # BLD AUTO: 272 10E9/L (ref 150–450)
PLATELET # BLD AUTO: 275 10E9/L (ref 150–450)
PLATELET # BLD AUTO: 326 10E9/L (ref 150–450)
PLATELET # BLD AUTO: 446 10E9/L (ref 150–450)
PLATELET # BLD EST: ABNORMAL 10*3/UL
PO2 BLD: 181 MM HG (ref 80–105)
POTASSIUM SERPL-SCNC: 3.7 MMOL/L (ref 3.4–5.3)
POTASSIUM SERPL-SCNC: 4.1 MMOL/L (ref 3.4–5.3)
POTASSIUM SERPL-SCNC: 4.2 MMOL/L (ref 3.4–5.3)
POTASSIUM SERPL-SCNC: 4.3 MMOL/L (ref 3.4–5.3)
POTASSIUM SERPL-SCNC: 4.3 MMOL/L (ref 3.4–5.3)
POTASSIUM SERPL-SCNC: 4.4 MMOL/L (ref 3.4–5.3)
POTASSIUM SERPL-SCNC: 4.4 MMOL/L (ref 3.4–5.3)
POTASSIUM SERPL-SCNC: 4.5 MMOL/L (ref 3.4–5.3)
PROT SERPL-MCNC: 4.7 G/DL (ref 6.8–8.8)
PROT SERPL-MCNC: 5.1 G/DL (ref 6.8–8.8)
PROT SERPL-MCNC: 7.3 G/DL (ref 6.8–8.8)
RADIOLOGIST FLAGS: ABNORMAL
RBC # BLD AUTO: 2.19 10E12/L (ref 3.8–5.2)
RBC # BLD AUTO: 2.59 10E12/L (ref 3.8–5.2)
RBC # BLD AUTO: 2.67 10E12/L (ref 3.8–5.2)
RBC # BLD AUTO: 2.69 10E12/L (ref 3.8–5.2)
RBC # BLD AUTO: 2.89 10E12/L (ref 3.8–5.2)
RBC # BLD AUTO: 2.9 10E12/L (ref 3.8–5.2)
RBC # BLD AUTO: 3.21 10E12/L (ref 3.8–5.2)
RBC # BLD AUTO: 3.88 10E12/L (ref 3.8–5.2)
RBC # BLD AUTO: 4.12 10E12/L (ref 3.8–5.2)
RBC # BLD AUTO: 4.46 10E12/L (ref 3.8–5.2)
SODIUM SERPL-SCNC: 127 MMOL/L (ref 133–144)
SODIUM SERPL-SCNC: 129 MMOL/L (ref 133–144)
SODIUM SERPL-SCNC: 130 MMOL/L (ref 133–144)
SODIUM SERPL-SCNC: 131 MMOL/L (ref 133–144)
SODIUM SERPL-SCNC: 134 MMOL/L (ref 133–144)
SODIUM SERPL-SCNC: 135 MMOL/L (ref 133–144)
SODIUM SERPL-SCNC: 135 MMOL/L (ref 133–144)
SODIUM SERPL-SCNC: 136 MMOL/L (ref 133–144)
SODIUM SERPL-SCNC: 136 MMOL/L (ref 133–144)
SODIUM SERPL-SCNC: 137 MMOL/L (ref 133–144)
SODIUM SERPL-SCNC: 137 MMOL/L (ref 133–144)
SPECIMEN EXP DATE BLD: NORMAL
SPECIMEN EXP DATE BLD: NORMAL
SPECIMEN SOURCE: NORMAL
TIBC SERPL-MCNC: 164 UG/DL (ref 240–430)
TRANSFUSION STATUS PATIENT QL: NORMAL
TRANSFUSION STATUS PATIENT QL: NORMAL
TSH SERPL DL<=0.005 MIU/L-ACNC: 3.31 MU/L (ref 0.4–4)
WBC # BLD AUTO: 10 10E9/L (ref 4–11)
WBC # BLD AUTO: 14.6 10E9/L (ref 4–11)
WBC # BLD AUTO: 15.8 10E9/L (ref 4–11)
WBC # BLD AUTO: 23 10E9/L (ref 4–11)
WBC # BLD AUTO: 25.5 10E9/L (ref 4–11)
WBC # BLD AUTO: 32.4 10E9/L (ref 4–11)
WBC # BLD AUTO: 7.8 10E9/L (ref 4–11)
WBC # BLD AUTO: 9.2 10E9/L (ref 4–11)
WBC # BLD AUTO: 9.5 10E9/L (ref 4–11)
WBC # BLD AUTO: 9.9 10E9/L (ref 4–11)

## 2019-01-01 PROCEDURE — 86850 RBC ANTIBODY SCREEN: CPT | Performed by: EMERGENCY MEDICINE

## 2019-01-01 PROCEDURE — 25000128 H RX IP 250 OP 636: Performed by: INTERNAL MEDICINE

## 2019-01-01 PROCEDURE — 80048 BASIC METABOLIC PNL TOTAL CA: CPT | Performed by: INTERNAL MEDICINE

## 2019-01-01 PROCEDURE — 86923 COMPATIBILITY TEST ELECTRIC: CPT | Performed by: INTERNAL MEDICINE

## 2019-01-01 PROCEDURE — 40000170 ZZH STATISTIC PRE-PROCEDURE ASSESSMENT II: Performed by: COLON & RECTAL SURGERY

## 2019-01-01 PROCEDURE — 21000001 ZZH R&B HEART CARE

## 2019-01-01 PROCEDURE — 80053 COMPREHEN METABOLIC PANEL: CPT | Performed by: EMERGENCY MEDICINE

## 2019-01-01 PROCEDURE — 85025 COMPLETE CBC W/AUTO DIFF WBC: CPT | Performed by: HOSPITALIST

## 2019-01-01 PROCEDURE — 12000000 ZZH R&B MED SURG/OB

## 2019-01-01 PROCEDURE — 25000131 ZZH RX MED GY IP 250 OP 636 PS 637: Performed by: INTERNAL MEDICINE

## 2019-01-01 PROCEDURE — 86900 BLOOD TYPING SEROLOGIC ABO: CPT | Performed by: INTERNAL MEDICINE

## 2019-01-01 PROCEDURE — 88307 TISSUE EXAM BY PATHOLOGIST: CPT | Performed by: COLON & RECTAL SURGERY

## 2019-01-01 PROCEDURE — 25000125 ZZHC RX 250: Performed by: INTERNAL MEDICINE

## 2019-01-01 PROCEDURE — 87640 STAPH A DNA AMP PROBE: CPT | Performed by: INTERNAL MEDICINE

## 2019-01-01 PROCEDURE — 97166 OT EVAL MOD COMPLEX 45 MIN: CPT | Mod: GO

## 2019-01-01 PROCEDURE — 20000003 ZZH R&B ICU

## 2019-01-01 PROCEDURE — 97535 SELF CARE MNGMENT TRAINING: CPT | Mod: GO

## 2019-01-01 PROCEDURE — 27211407 ZZ H KIT CATH IV 18 OR 20 G, POWERGLIDE BASIC

## 2019-01-01 PROCEDURE — 86850 RBC ANTIBODY SCREEN: CPT | Performed by: INTERNAL MEDICINE

## 2019-01-01 PROCEDURE — 36415 COLL VENOUS BLD VENIPUNCTURE: CPT | Performed by: HOSPITALIST

## 2019-01-01 PROCEDURE — 25000125 ZZHC RX 250: Performed by: HOSPITALIST

## 2019-01-01 PROCEDURE — 40000239 ZZH STATISTIC VAT ROUNDS

## 2019-01-01 PROCEDURE — 37000009 ZZH ANESTHESIA TECHNICAL FEE, EACH ADDTL 15 MIN: Performed by: COLON & RECTAL SURGERY

## 2019-01-01 PROCEDURE — 99231 SBSQ HOSP IP/OBS SF/LOW 25: CPT | Performed by: INTERNAL MEDICINE

## 2019-01-01 PROCEDURE — 86900 BLOOD TYPING SEROLOGIC ABO: CPT | Performed by: EMERGENCY MEDICINE

## 2019-01-01 PROCEDURE — 25000128 H RX IP 250 OP 636: Performed by: PHYSICIAN ASSISTANT

## 2019-01-01 PROCEDURE — 99233 SBSQ HOSP IP/OBS HIGH 50: CPT | Performed by: INTERNAL MEDICINE

## 2019-01-01 PROCEDURE — 99233 SBSQ HOSP IP/OBS HIGH 50: CPT | Performed by: HOSPITALIST

## 2019-01-01 PROCEDURE — 74177 CT ABD & PELVIS W/CONTRAST: CPT

## 2019-01-01 PROCEDURE — 85610 PROTHROMBIN TIME: CPT | Performed by: EMERGENCY MEDICINE

## 2019-01-01 PROCEDURE — 36000063 ZZH SURGERY LEVEL 4 EA 15 ADDTL MIN: Performed by: COLON & RECTAL SURGERY

## 2019-01-01 PROCEDURE — 97530 THERAPEUTIC ACTIVITIES: CPT | Mod: GP

## 2019-01-01 PROCEDURE — 87075 CULTR BACTERIA EXCEPT BLOOD: CPT | Performed by: COLON & RECTAL SURGERY

## 2019-01-01 PROCEDURE — 97110 THERAPEUTIC EXERCISES: CPT | Mod: GP

## 2019-01-01 PROCEDURE — 85018 HEMOGLOBIN: CPT | Performed by: PHYSICIAN ASSISTANT

## 2019-01-01 PROCEDURE — 80048 BASIC METABOLIC PNL TOTAL CA: CPT | Performed by: ANESTHESIOLOGY

## 2019-01-01 PROCEDURE — 25000132 ZZH RX MED GY IP 250 OP 250 PS 637

## 2019-01-01 PROCEDURE — 25000125 ZZHC RX 250: Performed by: NURSE ANESTHETIST, CERTIFIED REGISTERED

## 2019-01-01 PROCEDURE — 36415 COLL VENOUS BLD VENIPUNCTURE: CPT | Performed by: INTERNAL MEDICINE

## 2019-01-01 PROCEDURE — 85018 HEMOGLOBIN: CPT | Performed by: INTERNAL MEDICINE

## 2019-01-01 PROCEDURE — 83605 ASSAY OF LACTIC ACID: CPT | Performed by: EMERGENCY MEDICINE

## 2019-01-01 PROCEDURE — 25000132 ZZH RX MED GY IP 250 OP 250 PS 637: Performed by: HOSPITALIST

## 2019-01-01 PROCEDURE — 40000275 ZZH STATISTIC RCP TIME EA 10 MIN

## 2019-01-01 PROCEDURE — 87070 CULTURE OTHR SPECIMN AEROBIC: CPT | Performed by: COLON & RECTAL SURGERY

## 2019-01-01 PROCEDURE — 93005 ELECTROCARDIOGRAM TRACING: CPT

## 2019-01-01 PROCEDURE — 96365 THER/PROPH/DIAG IV INF INIT: CPT

## 2019-01-01 PROCEDURE — 40000193 ZZH STATISTIC PT WARD VISIT

## 2019-01-01 PROCEDURE — 83605 ASSAY OF LACTIC ACID: CPT | Performed by: INTERNAL MEDICINE

## 2019-01-01 PROCEDURE — 40000133 ZZH STATISTIC OT WARD VISIT

## 2019-01-01 PROCEDURE — 25000132 ZZH RX MED GY IP 250 OP 250 PS 637: Performed by: INTERNAL MEDICINE

## 2019-01-01 PROCEDURE — 99222 1ST HOSP IP/OBS MODERATE 55: CPT | Performed by: SURGERY

## 2019-01-01 PROCEDURE — 36569 INSJ PICC 5 YR+ W/O IMAGING: CPT

## 2019-01-01 PROCEDURE — 36000093 ZZH SURGERY LEVEL 4 1ST 30 MIN: Performed by: COLON & RECTAL SURGERY

## 2019-01-01 PROCEDURE — XW04331 INTRODUCTION OF IDARUCIZUMAB, DABIGATRAN REVERSAL AGENT INTO CENTRAL VEIN, PERCUTANEOUS APPROACH, NEW TECHNOLOGY GROUP 1: ICD-10-PCS | Performed by: INTERNAL MEDICINE

## 2019-01-01 PROCEDURE — 97530 THERAPEUTIC ACTIVITIES: CPT | Mod: GO

## 2019-01-01 PROCEDURE — 25000128 H RX IP 250 OP 636: Performed by: COLON & RECTAL SURGERY

## 2019-01-01 PROCEDURE — 36600 WITHDRAWAL OF ARTERIAL BLOOD: CPT

## 2019-01-01 PROCEDURE — 87641 MR-STAPH DNA AMP PROBE: CPT | Performed by: INTERNAL MEDICINE

## 2019-01-01 PROCEDURE — 99232 SBSQ HOSP IP/OBS MODERATE 35: CPT | Performed by: HOSPITALIST

## 2019-01-01 PROCEDURE — 25000128 H RX IP 250 OP 636: Performed by: EMERGENCY MEDICINE

## 2019-01-01 PROCEDURE — 71045 X-RAY EXAM CHEST 1 VIEW: CPT

## 2019-01-01 PROCEDURE — 85025 COMPLETE CBC W/AUTO DIFF WBC: CPT | Performed by: INTERNAL MEDICINE

## 2019-01-01 PROCEDURE — 82330 ASSAY OF CALCIUM: CPT | Performed by: INTERNAL MEDICINE

## 2019-01-01 PROCEDURE — 85730 THROMBOPLASTIN TIME PARTIAL: CPT | Performed by: ANESTHESIOLOGY

## 2019-01-01 PROCEDURE — 83605 ASSAY OF LACTIC ACID: CPT | Performed by: HOSPITALIST

## 2019-01-01 PROCEDURE — 40000141 ZZH STATISTIC PERIPHERAL IV START W/O US GUIDANCE

## 2019-01-01 PROCEDURE — 83735 ASSAY OF MAGNESIUM: CPT | Performed by: INTERNAL MEDICINE

## 2019-01-01 PROCEDURE — 84443 ASSAY THYROID STIM HORMONE: CPT | Performed by: INTERNAL MEDICINE

## 2019-01-01 PROCEDURE — 25000128 H RX IP 250 OP 636: Performed by: NURSE ANESTHETIST, CERTIFIED REGISTERED

## 2019-01-01 PROCEDURE — 25000566 ZZH SEVOFLURANE, EA 15 MIN: Performed by: COLON & RECTAL SURGERY

## 2019-01-01 PROCEDURE — 37000008 ZZH ANESTHESIA TECHNICAL FEE, 1ST 30 MIN: Performed by: COLON & RECTAL SURGERY

## 2019-01-01 PROCEDURE — 88307 TISSUE EXAM BY PATHOLOGIST: CPT | Mod: 26 | Performed by: COLON & RECTAL SURGERY

## 2019-01-01 PROCEDURE — 85027 COMPLETE CBC AUTOMATED: CPT | Performed by: INTERNAL MEDICINE

## 2019-01-01 PROCEDURE — 85018 HEMOGLOBIN: CPT | Performed by: COLON & RECTAL SURGERY

## 2019-01-01 PROCEDURE — 00000146 ZZHCL STATISTIC GLUCOSE BY METER IP

## 2019-01-01 PROCEDURE — 83540 ASSAY OF IRON: CPT | Performed by: HOSPITALIST

## 2019-01-01 PROCEDURE — 40000257 ZZH STATISTIC CONSULT NO CHARGE VASC ACCESS

## 2019-01-01 PROCEDURE — 25000125 ZZHC RX 250: Performed by: EMERGENCY MEDICINE

## 2019-01-01 PROCEDURE — 83880 ASSAY OF NATRIURETIC PEPTIDE: CPT | Performed by: HOSPITALIST

## 2019-01-01 PROCEDURE — P9016 RBC LEUKOCYTES REDUCED: HCPCS | Performed by: INTERNAL MEDICINE

## 2019-01-01 PROCEDURE — 85610 PROTHROMBIN TIME: CPT | Performed by: ANESTHESIOLOGY

## 2019-01-01 PROCEDURE — 36415 COLL VENOUS BLD VENIPUNCTURE: CPT | Performed by: PHYSICIAN ASSISTANT

## 2019-01-01 PROCEDURE — 99285 EMERGENCY DEPT VISIT HI MDM: CPT | Mod: 25

## 2019-01-01 PROCEDURE — 25000128 H RX IP 250 OP 636: Performed by: ANESTHESIOLOGY

## 2019-01-01 PROCEDURE — 99232 SBSQ HOSP IP/OBS MODERATE 35: CPT | Performed by: INTERNAL MEDICINE

## 2019-01-01 PROCEDURE — 85018 HEMOGLOBIN: CPT | Performed by: HOSPITALIST

## 2019-01-01 PROCEDURE — 36415 COLL VENOUS BLD VENIPUNCTURE: CPT | Performed by: ANESTHESIOLOGY

## 2019-01-01 PROCEDURE — 40000264 ECHOCARDIOGRAM COMPLETE

## 2019-01-01 PROCEDURE — 0DTF0ZZ RESECTION OF RIGHT LARGE INTESTINE, OPEN APPROACH: ICD-10-PCS | Performed by: COLON & RECTAL SURGERY

## 2019-01-01 PROCEDURE — 87040 BLOOD CULTURE FOR BACTERIA: CPT | Performed by: EMERGENCY MEDICINE

## 2019-01-01 PROCEDURE — 25500064 ZZH RX 255 OP 636: Performed by: INTERNAL MEDICINE

## 2019-01-01 PROCEDURE — 85730 THROMBOPLASTIN TIME PARTIAL: CPT | Performed by: EMERGENCY MEDICINE

## 2019-01-01 PROCEDURE — 25000125 ZZHC RX 250: Performed by: COLON & RECTAL SURGERY

## 2019-01-01 PROCEDURE — 93306 TTE W/DOPPLER COMPLETE: CPT | Mod: 26 | Performed by: INTERNAL MEDICINE

## 2019-01-01 PROCEDURE — 97116 GAIT TRAINING THERAPY: CPT | Mod: GP

## 2019-01-01 PROCEDURE — 80053 COMPREHEN METABOLIC PANEL: CPT | Performed by: HOSPITALIST

## 2019-01-01 PROCEDURE — C9399 UNCLASSIFIED DRUGS OR BIOLOG: HCPCS | Performed by: INTERNAL MEDICINE

## 2019-01-01 PROCEDURE — 40000556 ZZH STATISTIC PERIPHERAL IV START W US GUIDANCE

## 2019-01-01 PROCEDURE — 99222 1ST HOSP IP/OBS MODERATE 55: CPT | Performed by: INTERNAL MEDICINE

## 2019-01-01 PROCEDURE — 82272 OCCULT BLD FECES 1-3 TESTS: CPT | Performed by: HOSPITALIST

## 2019-01-01 PROCEDURE — 82805 BLOOD GASES W/O2 SATURATION: CPT | Performed by: INTERNAL MEDICINE

## 2019-01-01 PROCEDURE — 36415 COLL VENOUS BLD VENIPUNCTURE: CPT | Performed by: COLON & RECTAL SURGERY

## 2019-01-01 PROCEDURE — 86901 BLOOD TYPING SEROLOGIC RH(D): CPT | Performed by: EMERGENCY MEDICINE

## 2019-01-01 PROCEDURE — 87205 SMEAR GRAM STAIN: CPT | Performed by: COLON & RECTAL SURGERY

## 2019-01-01 PROCEDURE — 84100 ASSAY OF PHOSPHORUS: CPT | Performed by: INTERNAL MEDICINE

## 2019-01-01 PROCEDURE — 86901 BLOOD TYPING SEROLOGIC RH(D): CPT | Performed by: INTERNAL MEDICINE

## 2019-01-01 PROCEDURE — 97161 PT EVAL LOW COMPLEX 20 MIN: CPT | Mod: GP

## 2019-01-01 PROCEDURE — 83690 ASSAY OF LIPASE: CPT | Performed by: EMERGENCY MEDICINE

## 2019-01-01 PROCEDURE — 27210794 ZZH OR GENERAL SUPPLY STERILE: Performed by: COLON & RECTAL SURGERY

## 2019-01-01 PROCEDURE — 99223 1ST HOSP IP/OBS HIGH 75: CPT | Mod: AI | Performed by: INTERNAL MEDICINE

## 2019-01-01 PROCEDURE — 85025 COMPLETE CBC W/AUTO DIFF WBC: CPT | Performed by: EMERGENCY MEDICINE

## 2019-01-01 PROCEDURE — 83550 IRON BINDING TEST: CPT | Performed by: HOSPITALIST

## 2019-01-01 RX ORDER — HYDROMORPHONE HYDROCHLORIDE 5 MG/5ML
2 SOLUTION ORAL
Status: DISCONTINUED | OUTPATIENT
Start: 2019-01-01 | End: 2019-01-17 | Stop reason: HOSPADM

## 2019-01-01 RX ORDER — ONDANSETRON 2 MG/ML
4 INJECTION INTRAMUSCULAR; INTRAVENOUS EVERY 6 HOURS PRN
Status: DISCONTINUED | OUTPATIENT
Start: 2019-01-01 | End: 2019-01-01

## 2019-01-01 RX ORDER — DILTIAZEM HYDROCHLORIDE 5 MG/ML
5 INJECTION INTRAVENOUS ONCE
Status: COMPLETED | OUTPATIENT
Start: 2019-01-01 | End: 2019-01-01

## 2019-01-01 RX ORDER — PIPERACILLIN SODIUM, TAZOBACTAM SODIUM 3; .375 G/15ML; G/15ML
3.38 INJECTION, POWDER, LYOPHILIZED, FOR SOLUTION INTRAVENOUS ONCE
Status: COMPLETED | OUTPATIENT
Start: 2019-01-01 | End: 2019-01-01

## 2019-01-01 RX ORDER — SIMETHICONE 80 MG
80 TABLET,CHEWABLE ORAL EVERY MORNING
COMMUNITY

## 2019-01-01 RX ORDER — PROPOFOL 10 MG/ML
INJECTION, EMULSION INTRAVENOUS PRN
Status: DISCONTINUED | OUTPATIENT
Start: 2019-01-01 | End: 2019-01-01

## 2019-01-01 RX ORDER — ONDANSETRON 4 MG/1
4 TABLET, ORALLY DISINTEGRATING ORAL EVERY 30 MIN PRN
Status: DISCONTINUED | OUTPATIENT
Start: 2019-01-01 | End: 2019-01-01 | Stop reason: HOSPADM

## 2019-01-01 RX ORDER — HYDROMORPHONE HYDROCHLORIDE 1 MG/ML
0.5 INJECTION, SOLUTION INTRAMUSCULAR; INTRAVENOUS; SUBCUTANEOUS
Status: DISCONTINUED | OUTPATIENT
Start: 2019-01-01 | End: 2019-01-01

## 2019-01-01 RX ORDER — POTASSIUM CHLORIDE 29.8 MG/ML
20 INJECTION INTRAVENOUS
Status: DISCONTINUED | OUTPATIENT
Start: 2019-01-01 | End: 2019-01-01

## 2019-01-01 RX ORDER — DIPHENHYDRAMINE HYDROCHLORIDE 50 MG/ML
12.5 INJECTION INTRAMUSCULAR; INTRAVENOUS EVERY 6 HOURS PRN
Status: DISCONTINUED | OUTPATIENT
Start: 2019-01-01 | End: 2019-01-01

## 2019-01-01 RX ORDER — LIDOCAINE 40 MG/G
CREAM TOPICAL
Status: DISCONTINUED | OUTPATIENT
Start: 2019-01-01 | End: 2019-01-01 | Stop reason: CLARIF

## 2019-01-01 RX ORDER — DILTIAZEM HYDROCHLORIDE 5 MG/ML
20 INJECTION INTRAVENOUS ONCE
Status: DISCONTINUED | OUTPATIENT
Start: 2019-01-01 | End: 2019-01-01 | Stop reason: DRUGHIGH

## 2019-01-01 RX ORDER — NALOXONE HYDROCHLORIDE 0.4 MG/ML
.1-.4 INJECTION, SOLUTION INTRAMUSCULAR; INTRAVENOUS; SUBCUTANEOUS
Status: DISCONTINUED | OUTPATIENT
Start: 2019-01-01 | End: 2019-01-01

## 2019-01-01 RX ORDER — PIPERACILLIN SODIUM, TAZOBACTAM SODIUM 3; .375 G/15ML; G/15ML
3.38 INJECTION, POWDER, LYOPHILIZED, FOR SOLUTION INTRAVENOUS EVERY 6 HOURS
Status: COMPLETED | OUTPATIENT
Start: 2019-01-01 | End: 2019-01-01

## 2019-01-01 RX ORDER — DIPHENHYDRAMINE HCL 25 MG
25 CAPSULE ORAL EVERY 6 HOURS PRN
Status: DISCONTINUED | OUTPATIENT
Start: 2019-01-01 | End: 2019-01-17 | Stop reason: HOSPADM

## 2019-01-01 RX ORDER — POTASSIUM CL/LIDO/0.9 % NACL 10MEQ/0.1L
10 INTRAVENOUS SOLUTION, PIGGYBACK (ML) INTRAVENOUS
Status: DISCONTINUED | OUTPATIENT
Start: 2019-01-01 | End: 2019-01-01

## 2019-01-01 RX ORDER — PIPERACILLIN SODIUM, TAZOBACTAM SODIUM 2; .25 G/10ML; G/10ML
2.25 INJECTION, POWDER, LYOPHILIZED, FOR SOLUTION INTRAVENOUS EVERY 6 HOURS
Status: DISCONTINUED | OUTPATIENT
Start: 2019-01-01 | End: 2019-01-01

## 2019-01-01 RX ORDER — SPIRONOLACTONE 25 MG/1
12.5 TABLET ORAL DAILY
COMMUNITY

## 2019-01-01 RX ORDER — SODIUM CHLORIDE, SODIUM LACTATE, POTASSIUM CHLORIDE, CALCIUM CHLORIDE 600; 310; 30; 20 MG/100ML; MG/100ML; MG/100ML; MG/100ML
INJECTION, SOLUTION INTRAVENOUS CONTINUOUS
Status: DISCONTINUED | OUTPATIENT
Start: 2019-01-01 | End: 2019-01-01 | Stop reason: HOSPADM

## 2019-01-01 RX ORDER — ATROPINE SULFATE 10 MG/ML
2-4 SOLUTION/ DROPS OPHTHALMIC
Status: DISCONTINUED | OUTPATIENT
Start: 2019-01-01 | End: 2019-01-17 | Stop reason: HOSPADM

## 2019-01-01 RX ORDER — HYDROMORPHONE HYDROCHLORIDE 1 MG/ML
1-2 SOLUTION ORAL
Status: DISCONTINUED | OUTPATIENT
Start: 2019-01-01 | End: 2019-01-01

## 2019-01-01 RX ORDER — HYDROMORPHONE HYDROCHLORIDE 1 MG/ML
.3-.5 INJECTION, SOLUTION INTRAMUSCULAR; INTRAVENOUS; SUBCUTANEOUS EVERY 5 MIN PRN
Status: DISCONTINUED | OUTPATIENT
Start: 2019-01-01 | End: 2019-01-01 | Stop reason: HOSPADM

## 2019-01-01 RX ORDER — LIDOCAINE HYDROCHLORIDE 20 MG/ML
INJECTION, SOLUTION INFILTRATION; PERINEURAL PRN
Status: DISCONTINUED | OUTPATIENT
Start: 2019-01-01 | End: 2019-01-01

## 2019-01-01 RX ORDER — FUROSEMIDE 10 MG/ML
20 INJECTION INTRAMUSCULAR; INTRAVENOUS ONCE
Status: DISCONTINUED | OUTPATIENT
Start: 2019-01-01 | End: 2019-01-01

## 2019-01-01 RX ORDER — SODIUM CHLORIDE, SODIUM LACTATE, POTASSIUM CHLORIDE, CALCIUM CHLORIDE 600; 310; 30; 20 MG/100ML; MG/100ML; MG/100ML; MG/100ML
INJECTION, SOLUTION INTRAVENOUS CONTINUOUS
Status: DISCONTINUED | OUTPATIENT
Start: 2019-01-01 | End: 2019-01-01

## 2019-01-01 RX ORDER — NITROGLYCERIN 0.4 MG/1
0.4 TABLET SUBLINGUAL EVERY 5 MIN PRN
Status: DISCONTINUED | OUTPATIENT
Start: 2019-01-01 | End: 2019-01-01

## 2019-01-01 RX ORDER — IOPAMIDOL 755 MG/ML
89 INJECTION, SOLUTION INTRAVASCULAR ONCE
Status: COMPLETED | OUTPATIENT
Start: 2019-01-01 | End: 2019-01-01

## 2019-01-01 RX ORDER — ONDANSETRON 4 MG/1
4 TABLET, ORALLY DISINTEGRATING ORAL EVERY 6 HOURS PRN
Status: DISCONTINUED | OUTPATIENT
Start: 2019-01-01 | End: 2019-01-01

## 2019-01-01 RX ORDER — NALOXONE HYDROCHLORIDE 0.4 MG/ML
.1-.4 INJECTION, SOLUTION INTRAMUSCULAR; INTRAVENOUS; SUBCUTANEOUS
Status: ACTIVE | OUTPATIENT
Start: 2019-01-01 | End: 2019-01-01

## 2019-01-01 RX ORDER — ALBUMIN, HUMAN INJ 5% 5 %
25 SOLUTION INTRAVENOUS ONCE
Status: DISCONTINUED | OUTPATIENT
Start: 2019-01-01 | End: 2019-01-01

## 2019-01-01 RX ORDER — LORAZEPAM 2 MG/ML
.5-1 INJECTION INTRAMUSCULAR EVERY 4 HOURS PRN
Status: DISCONTINUED | OUTPATIENT
Start: 2019-01-01 | End: 2019-01-01

## 2019-01-01 RX ORDER — SODIUM CHLORIDE 9 MG/ML
INJECTION, SOLUTION INTRAVENOUS CONTINUOUS
Status: DISCONTINUED | OUTPATIENT
Start: 2019-01-01 | End: 2019-01-01

## 2019-01-01 RX ORDER — LORAZEPAM 0.5 MG/1
0.5 TABLET ORAL
Status: DISCONTINUED | OUTPATIENT
Start: 2019-01-01 | End: 2019-01-01

## 2019-01-01 RX ORDER — DIPHENHYDRAMINE HYDROCHLORIDE 50 MG/ML
25 INJECTION INTRAMUSCULAR; INTRAVENOUS EVERY 6 HOURS PRN
Status: DISCONTINUED | OUTPATIENT
Start: 2019-01-01 | End: 2019-01-17 | Stop reason: HOSPADM

## 2019-01-01 RX ORDER — SIMETHICONE 80 MG
80 TABLET,CHEWABLE ORAL EVERY 6 HOURS PRN
COMMUNITY

## 2019-01-01 RX ORDER — FENTANYL CITRATE 50 UG/ML
50 INJECTION, SOLUTION INTRAMUSCULAR; INTRAVENOUS
Status: DISCONTINUED | OUTPATIENT
Start: 2019-01-01 | End: 2019-01-01

## 2019-01-01 RX ORDER — POTASSIUM CHLORIDE 1.5 G/1.58G
20-40 POWDER, FOR SOLUTION ORAL
Status: DISCONTINUED | OUTPATIENT
Start: 2019-01-01 | End: 2019-01-01

## 2019-01-01 RX ORDER — ACETAMINOPHEN 325 MG/1
650 TABLET ORAL EVERY 4 HOURS PRN
Status: DISCONTINUED | OUTPATIENT
Start: 2019-01-01 | End: 2019-01-01

## 2019-01-01 RX ORDER — HEPARIN SODIUM 5000 [USP'U]/.5ML
5000 INJECTION, SOLUTION INTRAVENOUS; SUBCUTANEOUS EVERY 8 HOURS
Status: DISCONTINUED | OUTPATIENT
Start: 2019-01-01 | End: 2019-01-01

## 2019-01-01 RX ORDER — MAGNESIUM SULFATE HEPTAHYDRATE 40 MG/ML
2 INJECTION, SOLUTION INTRAVENOUS ONCE
Status: COMPLETED | OUTPATIENT
Start: 2019-01-01 | End: 2019-01-01

## 2019-01-01 RX ORDER — LORAZEPAM 0.5 MG/1
.5-1 TABLET ORAL
Status: DISCONTINUED | OUTPATIENT
Start: 2019-01-01 | End: 2019-01-17 | Stop reason: HOSPADM

## 2019-01-01 RX ORDER — DILTIAZEM HYDROCHLORIDE 180 MG/1
360 CAPSULE, EXTENDED RELEASE ORAL DAILY
Status: DISCONTINUED | OUTPATIENT
Start: 2019-01-01 | End: 2019-01-01

## 2019-01-01 RX ORDER — POTASSIUM CHLORIDE 7.45 MG/ML
10 INJECTION INTRAVENOUS
Status: DISCONTINUED | OUTPATIENT
Start: 2019-01-01 | End: 2019-01-01

## 2019-01-01 RX ORDER — PIPERACILLIN SODIUM, TAZOBACTAM SODIUM 3; .375 G/15ML; G/15ML
3.38 INJECTION, POWDER, LYOPHILIZED, FOR SOLUTION INTRAVENOUS EVERY 6 HOURS
Status: DISCONTINUED | OUTPATIENT
Start: 2019-01-01 | End: 2019-01-01

## 2019-01-01 RX ORDER — DILTIAZEM HCL 60 MG
60 TABLET ORAL EVERY 8 HOURS SCHEDULED
Status: DISCONTINUED | OUTPATIENT
Start: 2019-01-01 | End: 2019-01-01 | Stop reason: ALTCHOICE

## 2019-01-01 RX ORDER — DEXAMETHASONE SODIUM PHOSPHATE 4 MG/ML
INJECTION, SOLUTION INTRA-ARTICULAR; INTRALESIONAL; INTRAMUSCULAR; INTRAVENOUS; SOFT TISSUE PRN
Status: DISCONTINUED | OUTPATIENT
Start: 2019-01-01 | End: 2019-01-01

## 2019-01-01 RX ORDER — ONDANSETRON 2 MG/ML
INJECTION INTRAMUSCULAR; INTRAVENOUS PRN
Status: DISCONTINUED | OUTPATIENT
Start: 2019-01-01 | End: 2019-01-01

## 2019-01-01 RX ORDER — HYDROMORPHONE HYDROCHLORIDE 1 MG/ML
.3-.5 INJECTION, SOLUTION INTRAMUSCULAR; INTRAVENOUS; SUBCUTANEOUS
Status: DISCONTINUED | OUTPATIENT
Start: 2019-01-01 | End: 2019-01-01

## 2019-01-01 RX ORDER — METOPROLOL TARTRATE 25 MG/1
25 TABLET, FILM COATED ORAL 2 TIMES DAILY
Status: DISCONTINUED | OUTPATIENT
Start: 2019-01-01 | End: 2019-01-01

## 2019-01-01 RX ORDER — LORAZEPAM 2 MG/ML
.5-1 INJECTION INTRAMUSCULAR
Status: DISCONTINUED | OUTPATIENT
Start: 2019-01-01 | End: 2019-01-17 | Stop reason: HOSPADM

## 2019-01-01 RX ORDER — HYDROMORPHONE HYDROCHLORIDE 1 MG/ML
0.2 INJECTION, SOLUTION INTRAMUSCULAR; INTRAVENOUS; SUBCUTANEOUS EVERY 4 HOURS PRN
Status: DISCONTINUED | OUTPATIENT
Start: 2019-01-01 | End: 2019-01-01 | Stop reason: ALTCHOICE

## 2019-01-01 RX ORDER — VECURONIUM BROMIDE 1 MG/ML
8 INJECTION, POWDER, LYOPHILIZED, FOR SOLUTION INTRAVENOUS ONCE
Status: DISCONTINUED | OUTPATIENT
Start: 2019-01-01 | End: 2019-01-01 | Stop reason: CLARIF

## 2019-01-01 RX ORDER — NALOXONE HYDROCHLORIDE 0.4 MG/ML
.1-.4 INJECTION, SOLUTION INTRAMUSCULAR; INTRAVENOUS; SUBCUTANEOUS
Status: DISCONTINUED | OUTPATIENT
Start: 2019-01-01 | End: 2019-01-17 | Stop reason: HOSPADM

## 2019-01-01 RX ORDER — IOPAMIDOL 755 MG/ML
86 INJECTION, SOLUTION INTRAVASCULAR ONCE
Status: COMPLETED | OUTPATIENT
Start: 2019-01-01 | End: 2019-01-01

## 2019-01-01 RX ORDER — FENTANYL CITRATE 50 UG/ML
25-50 INJECTION, SOLUTION INTRAMUSCULAR; INTRAVENOUS
Status: DISCONTINUED | OUTPATIENT
Start: 2019-01-01 | End: 2019-01-01 | Stop reason: HOSPADM

## 2019-01-01 RX ORDER — ACETAMINOPHEN 650 MG/1
650 SUPPOSITORY RECTAL EVERY 6 HOURS PRN
Status: DISCONTINUED | OUTPATIENT
Start: 2019-01-01 | End: 2019-01-17 | Stop reason: HOSPADM

## 2019-01-01 RX ORDER — PROPOFOL 10 MG/ML
INJECTION, EMULSION INTRAVENOUS CONTINUOUS PRN
Status: DISCONTINUED | OUTPATIENT
Start: 2019-01-01 | End: 2019-01-01

## 2019-01-01 RX ORDER — HYDROMORPHONE HYDROCHLORIDE 1 MG/ML
0.2 INJECTION, SOLUTION INTRAMUSCULAR; INTRAVENOUS; SUBCUTANEOUS
Status: DISCONTINUED | OUTPATIENT
Start: 2019-01-01 | End: 2019-01-01 | Stop reason: ALTCHOICE

## 2019-01-01 RX ORDER — ONDANSETRON 2 MG/ML
4 INJECTION INTRAMUSCULAR; INTRAVENOUS EVERY 6 HOURS
Status: DISCONTINUED | OUTPATIENT
Start: 2019-01-01 | End: 2019-01-17 | Stop reason: HOSPADM

## 2019-01-01 RX ORDER — ONDANSETRON 4 MG/1
4 TABLET, ORALLY DISINTEGRATING ORAL EVERY 6 HOURS
Status: DISCONTINUED | OUTPATIENT
Start: 2019-01-01 | End: 2019-01-17 | Stop reason: HOSPADM

## 2019-01-01 RX ORDER — ACETAMINOPHEN 325 MG/1
650 TABLET ORAL EVERY 4 HOURS PRN
COMMUNITY

## 2019-01-01 RX ORDER — LIDOCAINE 40 MG/G
CREAM TOPICAL
Status: DISCONTINUED | OUTPATIENT
Start: 2019-01-01 | End: 2019-01-17 | Stop reason: HOSPADM

## 2019-01-01 RX ORDER — NITROGLYCERIN 0.4 MG/1
0.4 TABLET SUBLINGUAL EVERY 5 MIN PRN
Status: DISCONTINUED | OUTPATIENT
Start: 2019-01-01 | End: 2019-01-01 | Stop reason: CLARIF

## 2019-01-01 RX ORDER — ONDANSETRON 2 MG/ML
4 INJECTION INTRAMUSCULAR; INTRAVENOUS EVERY 30 MIN PRN
Status: DISCONTINUED | OUTPATIENT
Start: 2019-01-01 | End: 2019-01-01 | Stop reason: HOSPADM

## 2019-01-01 RX ORDER — FENTANYL CITRATE 50 UG/ML
INJECTION, SOLUTION INTRAMUSCULAR; INTRAVENOUS PRN
Status: DISCONTINUED | OUTPATIENT
Start: 2019-01-01 | End: 2019-01-01

## 2019-01-01 RX ORDER — PIPERACILLIN SODIUM, TAZOBACTAM SODIUM 3; .375 G/15ML; G/15ML
3.38 INJECTION, POWDER, LYOPHILIZED, FOR SOLUTION INTRAVENOUS EVERY 8 HOURS SCHEDULED
Status: DISCONTINUED | OUTPATIENT
Start: 2019-01-01 | End: 2019-01-01

## 2019-01-01 RX ORDER — MAGNESIUM HYDROXIDE 1200 MG/15ML
LIQUID ORAL PRN
Status: DISCONTINUED | OUTPATIENT
Start: 2019-01-01 | End: 2019-01-01 | Stop reason: HOSPADM

## 2019-01-01 RX ORDER — METOCLOPRAMIDE HYDROCHLORIDE 5 MG/ML
5 INJECTION INTRAMUSCULAR; INTRAVENOUS EVERY 6 HOURS PRN
Status: DISCONTINUED | OUTPATIENT
Start: 2019-01-01 | End: 2019-01-01

## 2019-01-01 RX ORDER — PROPOFOL 10 MG/ML
5-75 INJECTION, EMULSION INTRAVENOUS CONTINUOUS
Status: DISCONTINUED | OUTPATIENT
Start: 2019-01-01 | End: 2019-01-01

## 2019-01-01 RX ORDER — POTASSIUM CHLORIDE 1500 MG/1
20-40 TABLET, EXTENDED RELEASE ORAL
Status: DISCONTINUED | OUTPATIENT
Start: 2019-01-01 | End: 2019-01-01

## 2019-01-01 RX ORDER — SODIUM CHLORIDE, SODIUM LACTATE, POTASSIUM CHLORIDE, CALCIUM CHLORIDE 600; 310; 30; 20 MG/100ML; MG/100ML; MG/100ML; MG/100ML
INJECTION, SOLUTION INTRAVENOUS CONTINUOUS PRN
Status: DISCONTINUED | OUTPATIENT
Start: 2019-01-01 | End: 2019-01-01

## 2019-01-01 RX ADMIN — Medication 1 LOZENGE: at 09:23

## 2019-01-01 RX ADMIN — DILTIAZEM HYDROCHLORIDE 60 MG: 60 TABLET, FILM COATED ORAL at 05:45

## 2019-01-01 RX ADMIN — PIPERACILLIN AND TAZOBACTAM 2.25 G: 2; .25 INJECTION, POWDER, FOR SOLUTION INTRAVENOUS at 05:51

## 2019-01-01 RX ADMIN — Medication 1 LOZENGE: at 04:03

## 2019-01-01 RX ADMIN — HYDROMORPHONE HYDROCHLORIDE 0.3 MG: 1 INJECTION, SOLUTION INTRAMUSCULAR; INTRAVENOUS; SUBCUTANEOUS at 16:08

## 2019-01-01 RX ADMIN — SODIUM CHLORIDE, POTASSIUM CHLORIDE, SODIUM LACTATE AND CALCIUM CHLORIDE: 600; 310; 30; 20 INJECTION, SOLUTION INTRAVENOUS at 18:00

## 2019-01-01 RX ADMIN — Medication 1 SPRAY: at 12:07

## 2019-01-01 RX ADMIN — HYDROMORPHONE HYDROCHLORIDE 0.5 MG: 1 INJECTION, SOLUTION INTRAMUSCULAR; INTRAVENOUS; SUBCUTANEOUS at 14:14

## 2019-01-01 RX ADMIN — PROPOFOL 170 MG: 10 INJECTION, EMULSION INTRAVENOUS at 17:36

## 2019-01-01 RX ADMIN — FAMOTIDINE 20 MG: 10 INJECTION, SOLUTION INTRAVENOUS at 08:11

## 2019-01-01 RX ADMIN — DILTIAZEM HYDROCHLORIDE 360 MG: 180 CAPSULE, EXTENDED RELEASE ORAL at 09:24

## 2019-01-01 RX ADMIN — DILTIAZEM HYDROCHLORIDE 60 MG: 60 TABLET, FILM COATED ORAL at 14:11

## 2019-01-01 RX ADMIN — HYDROMORPHONE HYDROCHLORIDE 0.5 MG: 1 INJECTION, SOLUTION INTRAMUSCULAR; INTRAVENOUS; SUBCUTANEOUS at 21:59

## 2019-01-01 RX ADMIN — HYDROMORPHONE HYDROCHLORIDE 0.5 MG: 1 INJECTION, SOLUTION INTRAMUSCULAR; INTRAVENOUS; SUBCUTANEOUS at 08:04

## 2019-01-01 RX ADMIN — HYDROMORPHONE HYDROCHLORIDE 0.5 MG: 1 INJECTION, SOLUTION INTRAMUSCULAR; INTRAVENOUS; SUBCUTANEOUS at 01:15

## 2019-01-01 RX ADMIN — HYDROMORPHONE HYDROCHLORIDE 0.5 MG: 1 INJECTION, SOLUTION INTRAMUSCULAR; INTRAVENOUS; SUBCUTANEOUS at 10:15

## 2019-01-01 RX ADMIN — DILTIAZEM HYDROCHLORIDE 60 MG: 60 TABLET, FILM COATED ORAL at 22:44

## 2019-01-01 RX ADMIN — HYDROMORPHONE HYDROCHLORIDE 0.5 MG: 1 INJECTION, SOLUTION INTRAMUSCULAR; INTRAVENOUS; SUBCUTANEOUS at 18:29

## 2019-01-01 RX ADMIN — HYDROMORPHONE HYDROCHLORIDE 0.5 MG: 1 INJECTION, SOLUTION INTRAMUSCULAR; INTRAVENOUS; SUBCUTANEOUS at 17:33

## 2019-01-01 RX ADMIN — PHENYLEPHRINE HYDROCHLORIDE 200 MCG: 10 INJECTION, SOLUTION INTRAMUSCULAR; INTRAVENOUS; SUBCUTANEOUS at 17:48

## 2019-01-01 RX ADMIN — PIPERACILLIN AND TAZOBACTAM 2.25 G: 2; .25 INJECTION, POWDER, FOR SOLUTION INTRAVENOUS at 05:20

## 2019-01-01 RX ADMIN — HYDROMORPHONE HYDROCHLORIDE 0.3 MG: 1 INJECTION, SOLUTION INTRAMUSCULAR; INTRAVENOUS; SUBCUTANEOUS at 00:18

## 2019-01-01 RX ADMIN — HYDROMORPHONE HYDROCHLORIDE 0.5 MG: 1 INJECTION, SOLUTION INTRAMUSCULAR; INTRAVENOUS; SUBCUTANEOUS at 08:11

## 2019-01-01 RX ADMIN — PHENYLEPHRINE HYDROCHLORIDE 100 MCG: 10 INJECTION, SOLUTION INTRAMUSCULAR; INTRAVENOUS; SUBCUTANEOUS at 17:41

## 2019-01-01 RX ADMIN — HEPARIN SODIUM 5000 UNITS: 10000 INJECTION, SOLUTION INTRAVENOUS; SUBCUTANEOUS at 09:14

## 2019-01-01 RX ADMIN — HYDROMORPHONE HYDROCHLORIDE 0.5 MG: 1 INJECTION, SOLUTION INTRAMUSCULAR; INTRAVENOUS; SUBCUTANEOUS at 21:21

## 2019-01-01 RX ADMIN — IOPAMIDOL 89 ML: 755 INJECTION, SOLUTION INTRAVENOUS at 10:55

## 2019-01-01 RX ADMIN — HEPARIN SODIUM 5000 UNITS: 10000 INJECTION, SOLUTION INTRAVENOUS; SUBCUTANEOUS at 23:55

## 2019-01-01 RX ADMIN — Medication 1 LOZENGE: at 00:14

## 2019-01-01 RX ADMIN — HYDROMORPHONE HYDROCHLORIDE 0.5 MG: 1 INJECTION, SOLUTION INTRAMUSCULAR; INTRAVENOUS; SUBCUTANEOUS at 14:03

## 2019-01-01 RX ADMIN — HYDROMORPHONE HYDROCHLORIDE 0.5 MG: 1 INJECTION, SOLUTION INTRAMUSCULAR; INTRAVENOUS; SUBCUTANEOUS at 20:01

## 2019-01-01 RX ADMIN — LORAZEPAM 0.5 MG: 2 INJECTION INTRAMUSCULAR; INTRAVENOUS at 13:09

## 2019-01-01 RX ADMIN — HYDROMORPHONE HYDROCHLORIDE 0.5 MG: 1 INJECTION, SOLUTION INTRAMUSCULAR; INTRAVENOUS; SUBCUTANEOUS at 20:02

## 2019-01-01 RX ADMIN — DILTIAZEM HYDROCHLORIDE 15 MG/HR: 5 INJECTION INTRAVENOUS at 05:48

## 2019-01-01 RX ADMIN — DILTIAZEM HYDROCHLORIDE 15 MG/HR: 5 INJECTION INTRAVENOUS at 19:20

## 2019-01-01 RX ADMIN — ACETAMINOPHEN 650 MG: 325 TABLET, FILM COATED ORAL at 01:15

## 2019-01-01 RX ADMIN — HEPARIN SODIUM 5000 UNITS: 10000 INJECTION, SOLUTION INTRAVENOUS; SUBCUTANEOUS at 16:26

## 2019-01-01 RX ADMIN — PIPERACILLIN AND TAZOBACTAM 2.25 G: 2; .25 INJECTION, POWDER, FOR SOLUTION INTRAVENOUS at 00:17

## 2019-01-01 RX ADMIN — RANITIDINE 150 MG: 150 TABLET ORAL at 20:01

## 2019-01-01 RX ADMIN — DILTIAZEM HYDROCHLORIDE 60 MG: 60 TABLET, FILM COATED ORAL at 21:00

## 2019-01-01 RX ADMIN — HEPARIN SODIUM 5000 UNITS: 10000 INJECTION, SOLUTION INTRAVENOUS; SUBCUTANEOUS at 09:08

## 2019-01-01 RX ADMIN — DILTIAZEM HYDROCHLORIDE 7.5 MG/HR: 5 INJECTION INTRAVENOUS at 19:52

## 2019-01-01 RX ADMIN — DILTIAZEM HYDROCHLORIDE 15 MG/HR: 5 INJECTION INTRAVENOUS at 08:10

## 2019-01-01 RX ADMIN — PROCHLORPERAZINE EDISYLATE 5 MG: 5 INJECTION INTRAMUSCULAR; INTRAVENOUS at 18:16

## 2019-01-01 RX ADMIN — SODIUM CHLORIDE, PRESERVATIVE FREE: 5 INJECTION INTRAVENOUS at 09:50

## 2019-01-01 RX ADMIN — LORAZEPAM 1 MG: 2 INJECTION, SOLUTION INTRAMUSCULAR; INTRAVENOUS at 18:13

## 2019-01-01 RX ADMIN — SODIUM CHLORIDE 2000 ML: 9 INJECTION, SOLUTION INTRAVENOUS at 10:33

## 2019-01-01 RX ADMIN — ONDANSETRON 4 MG: 2 INJECTION INTRAMUSCULAR; INTRAVENOUS at 17:18

## 2019-01-01 RX ADMIN — HYDROMORPHONE HYDROCHLORIDE 0.5 MG: 1 INJECTION, SOLUTION INTRAMUSCULAR; INTRAVENOUS; SUBCUTANEOUS at 18:32

## 2019-01-01 RX ADMIN — METOCLOPRAMIDE 5 MG: 5 INJECTION, SOLUTION INTRAMUSCULAR; INTRAVENOUS at 19:47

## 2019-01-01 RX ADMIN — HYDROMORPHONE HYDROCHLORIDE 0.5 MG: 1 INJECTION, SOLUTION INTRAMUSCULAR; INTRAVENOUS; SUBCUTANEOUS at 15:56

## 2019-01-01 RX ADMIN — Medication 1 SPRAY: at 11:38

## 2019-01-01 RX ADMIN — Medication 1 LOZENGE: at 02:02

## 2019-01-01 RX ADMIN — HYDROMORPHONE HYDROCHLORIDE 0.5 MG: 1 INJECTION, SOLUTION INTRAMUSCULAR; INTRAVENOUS; SUBCUTANEOUS at 22:02

## 2019-01-01 RX ADMIN — PHENYLEPHRINE HYDROCHLORIDE 100 MCG: 10 INJECTION, SOLUTION INTRAMUSCULAR; INTRAVENOUS; SUBCUTANEOUS at 17:37

## 2019-01-01 RX ADMIN — PIPERACILLIN SODIUM,TAZOBACTAM SODIUM 3.38 G: 3; .375 INJECTION, POWDER, FOR SOLUTION INTRAVENOUS at 13:26

## 2019-01-01 RX ADMIN — HYDROMORPHONE HYDROCHLORIDE 0.5 MG: 1 INJECTION, SOLUTION INTRAMUSCULAR; INTRAVENOUS; SUBCUTANEOUS at 00:16

## 2019-01-01 RX ADMIN — HYDROMORPHONE HYDROCHLORIDE 0.5 MG: 1 INJECTION, SOLUTION INTRAMUSCULAR; INTRAVENOUS; SUBCUTANEOUS at 13:46

## 2019-01-01 RX ADMIN — HYDROMORPHONE HYDROCHLORIDE 0.5 MG: 1 INJECTION, SOLUTION INTRAMUSCULAR; INTRAVENOUS; SUBCUTANEOUS at 01:41

## 2019-01-01 RX ADMIN — HEPARIN SODIUM 5000 UNITS: 10000 INJECTION, SOLUTION INTRAVENOUS; SUBCUTANEOUS at 17:51

## 2019-01-01 RX ADMIN — SODIUM CHLORIDE, POTASSIUM CHLORIDE, SODIUM LACTATE AND CALCIUM CHLORIDE: 600; 310; 30; 20 INJECTION, SOLUTION INTRAVENOUS at 19:55

## 2019-01-01 RX ADMIN — DEXAMETHASONE SODIUM PHOSPHATE 4 MG: 4 INJECTION, SOLUTION INTRA-ARTICULAR; INTRALESIONAL; INTRAMUSCULAR; INTRAVENOUS; SOFT TISSUE at 18:20

## 2019-01-01 RX ADMIN — DILTIAZEM HYDROCHLORIDE 5 MG/HR: 5 INJECTION INTRAVENOUS at 22:08

## 2019-01-01 RX ADMIN — LIDOCAINE HYDROCHLORIDE 40 MG: 20 INJECTION, SOLUTION INFILTRATION; PERINEURAL at 17:36

## 2019-01-01 RX ADMIN — Medication 1 LOZENGE: at 06:09

## 2019-01-01 RX ADMIN — DILTIAZEM HYDROCHLORIDE 5 MG/HR: 5 INJECTION INTRAVENOUS at 22:14

## 2019-01-01 RX ADMIN — HEPARIN SODIUM 5000 UNITS: 10000 INJECTION, SOLUTION INTRAVENOUS; SUBCUTANEOUS at 15:09

## 2019-01-01 RX ADMIN — DILTIAZEM HYDROCHLORIDE 60 MG: 60 TABLET, FILM COATED ORAL at 08:40

## 2019-01-01 RX ADMIN — METOPROLOL TARTRATE 25 MG: 25 TABLET ORAL at 09:24

## 2019-01-01 RX ADMIN — HYDROMORPHONE HYDROCHLORIDE 0.5 MG: 1 INJECTION, SOLUTION INTRAMUSCULAR; INTRAVENOUS; SUBCUTANEOUS at 05:46

## 2019-01-01 RX ADMIN — PIPERACILLIN AND TAZOBACTAM 2.25 G: 2; .25 INJECTION, POWDER, FOR SOLUTION INTRAVENOUS at 11:46

## 2019-01-01 RX ADMIN — HYDROMORPHONE HYDROCHLORIDE 0.3 MG: 1 INJECTION, SOLUTION INTRAMUSCULAR; INTRAVENOUS; SUBCUTANEOUS at 01:49

## 2019-01-01 RX ADMIN — HYDROMORPHONE HYDROCHLORIDE 2 MG: 1 SOLUTION ORAL at 20:52

## 2019-01-01 RX ADMIN — HYDROMORPHONE HYDROCHLORIDE 0.5 MG: 1 INJECTION, SOLUTION INTRAMUSCULAR; INTRAVENOUS; SUBCUTANEOUS at 02:00

## 2019-01-01 RX ADMIN — HYDROMORPHONE HYDROCHLORIDE 0.5 MG: 1 INJECTION, SOLUTION INTRAMUSCULAR; INTRAVENOUS; SUBCUTANEOUS at 00:03

## 2019-01-01 RX ADMIN — HYDROMORPHONE HYDROCHLORIDE 0.5 MG: 1 INJECTION, SOLUTION INTRAMUSCULAR; INTRAVENOUS; SUBCUTANEOUS at 09:34

## 2019-01-01 RX ADMIN — HYDROMORPHONE HYDROCHLORIDE 0.5 MG: 1 INJECTION, SOLUTION INTRAMUSCULAR; INTRAVENOUS; SUBCUTANEOUS at 12:56

## 2019-01-01 RX ADMIN — SODIUM CHLORIDE 66 ML: 9 INJECTION, SOLUTION INTRAVENOUS at 11:43

## 2019-01-01 RX ADMIN — HYDROMORPHONE HYDROCHLORIDE 0.5 MG: 1 INJECTION, SOLUTION INTRAMUSCULAR; INTRAVENOUS; SUBCUTANEOUS at 06:28

## 2019-01-01 RX ADMIN — PIPERACILLIN AND TAZOBACTAM 2.25 G: 2; .25 INJECTION, POWDER, FOR SOLUTION INTRAVENOUS at 22:42

## 2019-01-01 RX ADMIN — DILTIAZEM HYDROCHLORIDE 15 MG/HR: 5 INJECTION INTRAVENOUS at 19:12

## 2019-01-01 RX ADMIN — HYDROMORPHONE HYDROCHLORIDE 0.5 MG: 1 INJECTION, SOLUTION INTRAMUSCULAR; INTRAVENOUS; SUBCUTANEOUS at 05:54

## 2019-01-01 RX ADMIN — HEPARIN SODIUM 5000 UNITS: 10000 INJECTION, SOLUTION INTRAVENOUS; SUBCUTANEOUS at 00:21

## 2019-01-01 RX ADMIN — PIPERACILLIN AND TAZOBACTAM 2.25 G: 2; .25 INJECTION, POWDER, FOR SOLUTION INTRAVENOUS at 11:54

## 2019-01-01 RX ADMIN — PIPERACILLIN AND TAZOBACTAM 2.25 G: 2; .25 INJECTION, POWDER, FOR SOLUTION INTRAVENOUS at 23:31

## 2019-01-01 RX ADMIN — PROPOFOL 40 MCG/KG/MIN: 10 INJECTION, EMULSION INTRAVENOUS at 18:48

## 2019-01-01 RX ADMIN — PIPERACILLIN AND TAZOBACTAM 2.25 G: 2; .25 INJECTION, POWDER, FOR SOLUTION INTRAVENOUS at 05:10

## 2019-01-01 RX ADMIN — HYDROMORPHONE HYDROCHLORIDE 0.5 MG: 1 INJECTION, SOLUTION INTRAMUSCULAR; INTRAVENOUS; SUBCUTANEOUS at 02:18

## 2019-01-01 RX ADMIN — ONDANSETRON 4 MG: 2 INJECTION INTRAMUSCULAR; INTRAVENOUS at 18:20

## 2019-01-01 RX ADMIN — Medication 1 SPRAY: at 06:08

## 2019-01-01 RX ADMIN — ONDANSETRON 4 MG: 4 TABLET, ORALLY DISINTEGRATING ORAL at 22:17

## 2019-01-01 RX ADMIN — HYDROMORPHONE HYDROCHLORIDE 0.5 MG: 1 INJECTION, SOLUTION INTRAMUSCULAR; INTRAVENOUS; SUBCUTANEOUS at 04:15

## 2019-01-01 RX ADMIN — ACETAMINOPHEN 650 MG: 325 TABLET, FILM COATED ORAL at 21:00

## 2019-01-01 RX ADMIN — HYDROMORPHONE HYDROCHLORIDE 2 MG: 1 SOLUTION ORAL at 22:51

## 2019-01-01 RX ADMIN — HYDROMORPHONE HYDROCHLORIDE 0.2 MG: 1 INJECTION, SOLUTION INTRAMUSCULAR; INTRAVENOUS; SUBCUTANEOUS at 21:09

## 2019-01-01 RX ADMIN — PIPERACILLIN SODIUM,TAZOBACTAM SODIUM 3.38 G: 3; .375 INJECTION, POWDER, FOR SOLUTION INTRAVENOUS at 10:40

## 2019-01-01 RX ADMIN — HYDROMORPHONE HYDROCHLORIDE 0.5 MG: 1 INJECTION, SOLUTION INTRAMUSCULAR; INTRAVENOUS; SUBCUTANEOUS at 12:06

## 2019-01-01 RX ADMIN — SODIUM CHLORIDE, POTASSIUM CHLORIDE, SODIUM LACTATE AND CALCIUM CHLORIDE: 600; 310; 30; 20 INJECTION, SOLUTION INTRAVENOUS at 17:00

## 2019-01-01 RX ADMIN — ROCURONIUM BROMIDE 70 MG: 10 INJECTION INTRAVENOUS at 17:36

## 2019-01-01 RX ADMIN — Medication 1 SPRAY: at 18:04

## 2019-01-01 RX ADMIN — HYDROMORPHONE HYDROCHLORIDE 0.5 MG: 1 INJECTION, SOLUTION INTRAMUSCULAR; INTRAVENOUS; SUBCUTANEOUS at 18:04

## 2019-01-01 RX ADMIN — MAGNESIUM SULFATE IN WATER 2 G: 40 INJECTION, SOLUTION INTRAVENOUS at 22:03

## 2019-01-01 RX ADMIN — ONDANSETRON 4 MG: 4 TABLET, ORALLY DISINTEGRATING ORAL at 20:04

## 2019-01-01 RX ADMIN — DILTIAZEM HYDROCHLORIDE 5 MG: 5 INJECTION INTRAVENOUS at 21:06

## 2019-01-01 RX ADMIN — HYDROMORPHONE HYDROCHLORIDE 0.5 MG: 1 INJECTION, SOLUTION INTRAMUSCULAR; INTRAVENOUS; SUBCUTANEOUS at 09:33

## 2019-01-01 RX ADMIN — PIPERACILLIN AND TAZOBACTAM 2.25 G: 2; .25 INJECTION, POWDER, FOR SOLUTION INTRAVENOUS at 17:26

## 2019-01-01 RX ADMIN — SODIUM CHLORIDE, POTASSIUM CHLORIDE, SODIUM LACTATE AND CALCIUM CHLORIDE: 600; 310; 30; 20 INJECTION, SOLUTION INTRAVENOUS at 17:42

## 2019-01-01 RX ADMIN — HEPARIN SODIUM 5000 UNITS: 10000 INJECTION, SOLUTION INTRAVENOUS; SUBCUTANEOUS at 00:04

## 2019-01-01 RX ADMIN — HYDROMORPHONE HYDROCHLORIDE 0.3 MG: 1 INJECTION, SOLUTION INTRAMUSCULAR; INTRAVENOUS; SUBCUTANEOUS at 17:42

## 2019-01-01 RX ADMIN — HYDROMORPHONE HYDROCHLORIDE 2 MG: 1 SOLUTION ORAL at 16:20

## 2019-01-01 RX ADMIN — HYDROMORPHONE HYDROCHLORIDE 2 MG: 1 SOLUTION ORAL at 18:53

## 2019-01-01 RX ADMIN — RANITIDINE 150 MG: 150 TABLET ORAL at 09:24

## 2019-01-01 RX ADMIN — HYDROMORPHONE HYDROCHLORIDE 0.5 MG: 1 INJECTION, SOLUTION INTRAMUSCULAR; INTRAVENOUS; SUBCUTANEOUS at 02:15

## 2019-01-01 RX ADMIN — SODIUM CHLORIDE 1000 ML: 9 INJECTION, SOLUTION INTRAVENOUS at 15:30

## 2019-01-01 RX ADMIN — HYDROMORPHONE HYDROCHLORIDE 0.5 MG: 1 INJECTION, SOLUTION INTRAMUSCULAR; INTRAVENOUS; SUBCUTANEOUS at 19:36

## 2019-01-01 RX ADMIN — DILTIAZEM HYDROCHLORIDE 15 MG/HR: 5 INJECTION INTRAVENOUS at 14:15

## 2019-01-01 RX ADMIN — SODIUM CHLORIDE, PRESERVATIVE FREE: 5 INJECTION INTRAVENOUS at 13:02

## 2019-01-01 RX ADMIN — FENTANYL CITRATE 100 MCG: 50 INJECTION, SOLUTION INTRAMUSCULAR; INTRAVENOUS at 17:36

## 2019-01-01 RX ADMIN — DILTIAZEM HYDROCHLORIDE 15 MG/HR: 5 INJECTION INTRAVENOUS at 20:27

## 2019-01-01 RX ADMIN — HYDROMORPHONE HYDROCHLORIDE 0.5 MG: 1 INJECTION, SOLUTION INTRAMUSCULAR; INTRAVENOUS; SUBCUTANEOUS at 21:38

## 2019-01-01 RX ADMIN — HYDROMORPHONE HYDROCHLORIDE 0.5 MG: 1 INJECTION, SOLUTION INTRAMUSCULAR; INTRAVENOUS; SUBCUTANEOUS at 06:11

## 2019-01-01 RX ADMIN — SODIUM CHLORIDE 67 ML: 9 INJECTION, SOLUTION INTRAVENOUS at 10:57

## 2019-01-01 RX ADMIN — HYDROMORPHONE HYDROCHLORIDE 0.5 MG: 1 INJECTION, SOLUTION INTRAMUSCULAR; INTRAVENOUS; SUBCUTANEOUS at 22:44

## 2019-01-01 RX ADMIN — SODIUM CHLORIDE, POTASSIUM CHLORIDE, SODIUM LACTATE AND CALCIUM CHLORIDE: 600; 310; 30; 20 INJECTION, SOLUTION INTRAVENOUS at 17:21

## 2019-01-01 RX ADMIN — PIPERACILLIN AND TAZOBACTAM 2.25 G: 2; .25 INJECTION, POWDER, FOR SOLUTION INTRAVENOUS at 06:28

## 2019-01-01 RX ADMIN — DILTIAZEM HYDROCHLORIDE 360 MG: 180 CAPSULE, EXTENDED RELEASE ORAL at 09:03

## 2019-01-01 RX ADMIN — PHENYLEPHRINE HYDROCHLORIDE 100 MCG: 10 INJECTION, SOLUTION INTRAMUSCULAR; INTRAVENOUS; SUBCUTANEOUS at 17:44

## 2019-01-01 RX ADMIN — PIPERACILLIN AND TAZOBACTAM 2.25 G: 2; .25 INJECTION, POWDER, FOR SOLUTION INTRAVENOUS at 11:18

## 2019-01-01 RX ADMIN — PHENYLEPHRINE HYDROCHLORIDE 0.3 MCG/KG/MIN: 10 INJECTION, SOLUTION INTRAMUSCULAR; INTRAVENOUS; SUBCUTANEOUS at 17:40

## 2019-01-01 RX ADMIN — HYDROMORPHONE HYDROCHLORIDE 0.5 MG: 1 INJECTION, SOLUTION INTRAMUSCULAR; INTRAVENOUS; SUBCUTANEOUS at 00:11

## 2019-01-01 RX ADMIN — PIPERACILLIN AND TAZOBACTAM 2.25 G: 2; .25 INJECTION, POWDER, FOR SOLUTION INTRAVENOUS at 05:03

## 2019-01-01 RX ADMIN — HYDROMORPHONE HYDROCHLORIDE 0.3 MG: 1 INJECTION, SOLUTION INTRAMUSCULAR; INTRAVENOUS; SUBCUTANEOUS at 09:36

## 2019-01-01 RX ADMIN — HYDROMORPHONE HYDROCHLORIDE 0.3 MG: 1 INJECTION, SOLUTION INTRAMUSCULAR; INTRAVENOUS; SUBCUTANEOUS at 20:19

## 2019-01-01 RX ADMIN — DILTIAZEM HYDROCHLORIDE 15 MG/HR: 5 INJECTION INTRAVENOUS at 08:56

## 2019-01-01 RX ADMIN — HYDROMORPHONE HYDROCHLORIDE 0.5 MG: 1 INJECTION, SOLUTION INTRAMUSCULAR; INTRAVENOUS; SUBCUTANEOUS at 21:58

## 2019-01-01 RX ADMIN — IDARUCIZUMAB 2.5 G: 50 INJECTION INTRAVENOUS at 16:11

## 2019-01-01 RX ADMIN — PIPERACILLIN AND TAZOBACTAM 2.25 G: 2; .25 INJECTION, POWDER, FOR SOLUTION INTRAVENOUS at 23:51

## 2019-01-01 RX ADMIN — HYDROMORPHONE HYDROCHLORIDE 0.5 MG: 1 INJECTION, SOLUTION INTRAMUSCULAR; INTRAVENOUS; SUBCUTANEOUS at 06:05

## 2019-01-01 RX ADMIN — Medication 1 LOZENGE: at 23:56

## 2019-01-01 RX ADMIN — HYDROMORPHONE HYDROCHLORIDE 0.5 MG: 1 INJECTION, SOLUTION INTRAMUSCULAR; INTRAVENOUS; SUBCUTANEOUS at 03:29

## 2019-01-01 RX ADMIN — IDARUCIZUMAB 2.5 G: 50 INJECTION INTRAVENOUS at 16:07

## 2019-01-01 RX ADMIN — HYDROMORPHONE HYDROCHLORIDE 0.5 MG: 1 INJECTION, SOLUTION INTRAMUSCULAR; INTRAVENOUS; SUBCUTANEOUS at 15:29

## 2019-01-01 RX ADMIN — Medication 1 LOZENGE: at 11:23

## 2019-01-01 RX ADMIN — SODIUM CHLORIDE, POTASSIUM CHLORIDE, SODIUM LACTATE AND CALCIUM CHLORIDE: 600; 310; 30; 20 INJECTION, SOLUTION INTRAVENOUS at 19:52

## 2019-01-01 RX ADMIN — PIPERACILLIN AND TAZOBACTAM 2.25 G: 2; .25 INJECTION, POWDER, FOR SOLUTION INTRAVENOUS at 10:40

## 2019-01-01 RX ADMIN — HYDROMORPHONE HYDROCHLORIDE 0.5 MG: 1 INJECTION, SOLUTION INTRAMUSCULAR; INTRAVENOUS; SUBCUTANEOUS at 16:46

## 2019-01-01 RX ADMIN — DILTIAZEM HYDROCHLORIDE 15 MG/HR: 5 INJECTION INTRAVENOUS at 09:26

## 2019-01-01 RX ADMIN — HYDROMORPHONE HYDROCHLORIDE 0.5 MG: 1 INJECTION, SOLUTION INTRAMUSCULAR; INTRAVENOUS; SUBCUTANEOUS at 04:24

## 2019-01-01 RX ADMIN — FAMOTIDINE 20 MG: 10 INJECTION, SOLUTION INTRAVENOUS at 20:01

## 2019-01-01 RX ADMIN — DIPHENHYDRAMINE HYDROCHLORIDE 25 MG: 50 INJECTION, SOLUTION INTRAMUSCULAR; INTRAVENOUS at 16:20

## 2019-01-01 RX ADMIN — Medication 1 SPRAY: at 00:13

## 2019-01-01 RX ADMIN — HYDROMORPHONE HYDROCHLORIDE 0.4 MG: 1 INJECTION, SOLUTION INTRAMUSCULAR; INTRAVENOUS; SUBCUTANEOUS at 02:54

## 2019-01-01 RX ADMIN — IOPAMIDOL 86 ML: 755 INJECTION, SOLUTION INTRAVENOUS at 11:42

## 2019-01-01 RX ADMIN — PIPERACILLIN SODIUM,TAZOBACTAM SODIUM 3.38 G: 3; .375 INJECTION, POWDER, FOR SOLUTION INTRAVENOUS at 20:53

## 2019-01-01 RX ADMIN — PIPERACILLIN AND TAZOBACTAM 2.25 G: 2; .25 INJECTION, POWDER, FOR SOLUTION INTRAVENOUS at 16:19

## 2019-01-01 RX ADMIN — DILTIAZEM HYDROCHLORIDE 15 MG/HR: 5 INJECTION INTRAVENOUS at 10:36

## 2019-01-01 RX ADMIN — HYDROMORPHONE HYDROCHLORIDE 0.5 MG: 1 INJECTION, SOLUTION INTRAMUSCULAR; INTRAVENOUS; SUBCUTANEOUS at 11:27

## 2019-01-01 RX ADMIN — LORAZEPAM 0.5 MG: 2 INJECTION INTRAMUSCULAR; INTRAVENOUS at 21:01

## 2019-01-01 RX ADMIN — FAMOTIDINE 20 MG: 10 INJECTION, SOLUTION INTRAVENOUS at 22:46

## 2019-01-01 RX ADMIN — DILTIAZEM HYDROCHLORIDE 5 MG/HR: 5 INJECTION INTRAVENOUS at 01:42

## 2019-01-01 RX ADMIN — HYDROMORPHONE HYDROCHLORIDE 0.5 MG: 1 INJECTION, SOLUTION INTRAMUSCULAR; INTRAVENOUS; SUBCUTANEOUS at 11:38

## 2019-01-01 RX ADMIN — HUMAN ALBUMIN MICROSPHERES AND PERFLUTREN 9 ML: 10; .22 INJECTION, SOLUTION INTRAVENOUS at 14:45

## 2019-01-01 RX ADMIN — HYDROMORPHONE HYDROCHLORIDE 0.5 MG: 1 INJECTION, SOLUTION INTRAMUSCULAR; INTRAVENOUS; SUBCUTANEOUS at 10:35

## 2019-01-01 RX ADMIN — ONDANSETRON 4 MG: 2 INJECTION INTRAMUSCULAR; INTRAVENOUS at 02:49

## 2019-01-01 RX ADMIN — HYDROMORPHONE HYDROCHLORIDE 0.5 MG: 1 INJECTION, SOLUTION INTRAMUSCULAR; INTRAVENOUS; SUBCUTANEOUS at 05:12

## 2019-01-01 RX ADMIN — PIPERACILLIN AND TAZOBACTAM 2.25 G: 2; .25 INJECTION, POWDER, FOR SOLUTION INTRAVENOUS at 22:44

## 2019-01-01 RX ADMIN — PIPERACILLIN AND TAZOBACTAM 2.25 G: 2; .25 INJECTION, POWDER, FOR SOLUTION INTRAVENOUS at 17:20

## 2019-01-01 RX ADMIN — PIPERACILLIN AND TAZOBACTAM 2.25 G: 2; .25 INJECTION, POWDER, FOR SOLUTION INTRAVENOUS at 17:50

## 2019-01-01 RX ADMIN — HYDROMORPHONE HYDROCHLORIDE 2 MG: 1 SOLUTION ORAL at 14:00

## 2019-01-01 RX ADMIN — HYDROMORPHONE HYDROCHLORIDE 0.5 MG: 1 INJECTION, SOLUTION INTRAMUSCULAR; INTRAVENOUS; SUBCUTANEOUS at 17:26

## 2019-01-01 RX ADMIN — DILTIAZEM HYDROCHLORIDE 60 MG: 60 TABLET, FILM COATED ORAL at 12:59

## 2019-01-01 RX ADMIN — Medication 1 LOZENGE: at 22:33

## 2019-01-01 RX ADMIN — PIPERACILLIN SODIUM,TAZOBACTAM SODIUM 3.38 G: 3; .375 INJECTION, POWDER, FOR SOLUTION INTRAVENOUS at 13:49

## 2019-01-01 RX ADMIN — SODIUM CHLORIDE, PRESERVATIVE FREE: 5 INJECTION INTRAVENOUS at 06:28

## 2019-01-01 RX ADMIN — PIPERACILLIN AND TAZOBACTAM 2.25 G: 2; .25 INJECTION, POWDER, FOR SOLUTION INTRAVENOUS at 17:11

## 2019-01-01 RX ADMIN — SODIUM CHLORIDE, PRESERVATIVE FREE: 5 INJECTION INTRAVENOUS at 04:21

## 2019-01-01 RX ADMIN — HYDROMORPHONE HYDROCHLORIDE 0.5 MG: 1 INJECTION, SOLUTION INTRAMUSCULAR; INTRAVENOUS; SUBCUTANEOUS at 13:45

## 2019-01-01 RX ADMIN — ONDANSETRON 4 MG: 4 TABLET, ORALLY DISINTEGRATING ORAL at 14:00

## 2019-01-01 RX ADMIN — SODIUM CHLORIDE, POTASSIUM CHLORIDE, SODIUM LACTATE AND CALCIUM CHLORIDE: 600; 310; 30; 20 INJECTION, SOLUTION INTRAVENOUS at 02:50

## 2019-01-01 RX ADMIN — HYDROMORPHONE HYDROCHLORIDE 0.5 MG: 1 INJECTION, SOLUTION INTRAMUSCULAR; INTRAVENOUS; SUBCUTANEOUS at 22:42

## 2019-01-01 RX ADMIN — DILTIAZEM HYDROCHLORIDE 15 MG/HR: 5 INJECTION INTRAVENOUS at 23:41

## 2019-01-01 RX ADMIN — Medication 1 LOZENGE: at 00:58

## 2019-01-01 RX ADMIN — HYDROMORPHONE HYDROCHLORIDE 0.5 MG: 1 INJECTION, SOLUTION INTRAMUSCULAR; INTRAVENOUS; SUBCUTANEOUS at 12:50

## 2019-01-01 RX ADMIN — SODIUM CHLORIDE, POTASSIUM CHLORIDE, SODIUM LACTATE AND CALCIUM CHLORIDE: 600; 310; 30; 20 INJECTION, SOLUTION INTRAVENOUS at 11:19

## 2019-01-01 ASSESSMENT — ACTIVITIES OF DAILY LIVING (ADL)
ADLS_ACUITY_SCORE: 23
ADLS_ACUITY_SCORE: 18
ADLS_ACUITY_SCORE: 26
ADLS_ACUITY_SCORE: 19
ADLS_ACUITY_SCORE: 26
ADLS_ACUITY_SCORE: 20
ADLS_ACUITY_SCORE: 24
ADLS_ACUITY_SCORE: 23
COGNITION: 1 - ATTENTION OR MEMORY DEFICITS
ADLS_ACUITY_SCORE: 19
ADLS_ACUITY_SCORE: 19
ADLS_ACUITY_SCORE: 22
ADLS_ACUITY_SCORE: 24
ADLS_ACUITY_SCORE: 20
ADLS_ACUITY_SCORE: 18
ADLS_ACUITY_SCORE: 24
ADLS_ACUITY_SCORE: 26
ADLS_ACUITY_SCORE: 20
RETIRED_EATING: 0-->INDEPENDENT
SWALLOWING: 0-->SWALLOWS FOODS/LIQUIDS WITHOUT DIFFICULTY
ADLS_ACUITY_SCORE: 20
ADLS_ACUITY_SCORE: 17
ADLS_ACUITY_SCORE: 17
ADLS_ACUITY_SCORE: 23
ADLS_ACUITY_SCORE: 20
ADLS_ACUITY_SCORE: 19
ADLS_ACUITY_SCORE: 24
ADLS_ACUITY_SCORE: 25
ADLS_ACUITY_SCORE: 24
ADLS_ACUITY_SCORE: 16
ADLS_ACUITY_SCORE: 20
ADLS_ACUITY_SCORE: 19
ADLS_ACUITY_SCORE: 19
ADLS_ACUITY_SCORE: 24
ADLS_ACUITY_SCORE: 20
ADLS_ACUITY_SCORE: 20
ADLS_ACUITY_SCORE: 25
ADLS_ACUITY_SCORE: 24
ADLS_ACUITY_SCORE: 26
ADLS_ACUITY_SCORE: 23
ADLS_ACUITY_SCORE: 26
ADLS_ACUITY_SCORE: 25
ADLS_ACUITY_SCORE: 23
ADLS_ACUITY_SCORE: 19
RETIRED_COMMUNICATION: 0-->UNDERSTANDS/COMMUNICATES WITHOUT DIFFICULTY
ADLS_ACUITY_SCORE: 22
ADLS_ACUITY_SCORE: 24
ADLS_ACUITY_SCORE: 20
ADLS_ACUITY_SCORE: 24
ADLS_ACUITY_SCORE: 22
ADLS_ACUITY_SCORE: 17
ADLS_ACUITY_SCORE: 24
ADLS_ACUITY_SCORE: 23
ADLS_ACUITY_SCORE: 20
ADLS_ACUITY_SCORE: 19
ADLS_ACUITY_SCORE: 24
ADLS_ACUITY_SCORE: 24
ADLS_ACUITY_SCORE: 20
ADLS_ACUITY_SCORE: 18
ADLS_ACUITY_SCORE: 22
ADLS_ACUITY_SCORE: 22
ADLS_ACUITY_SCORE: 20
ADLS_ACUITY_SCORE: 22
DRESS: 0-->INDEPENDENT
ADLS_ACUITY_SCORE: 24
ADLS_ACUITY_SCORE: 20
ADLS_ACUITY_SCORE: 23

## 2019-01-01 ASSESSMENT — ENCOUNTER SYMPTOMS
DIAPHORESIS: 1
DIARRHEA: 0
SHORTNESS OF BREATH: 0
CONSTIPATION: 1
LIGHT-HEADEDNESS: 0
BLOOD IN STOOL: 0
CHILLS: 0
ABDOMINAL PAIN: 1
DIZZINESS: 0
NAUSEA: 1
VOMITING: 1
FEVER: 0

## 2019-01-01 ASSESSMENT — MIFFLIN-ST. JEOR
SCORE: 1216.25
SCORE: 1235.25
SCORE: 1228.25
SCORE: 1234.25
SCORE: 1173.44
SCORE: 1204.25
SCORE: 1178.88
SCORE: 1193.25

## 2019-01-01 ASSESSMENT — LIFESTYLE VARIABLES: TOBACCO_USE: 0

## 2019-01-06 PROBLEM — K63.1 COLON PERFORATION (H): Status: ACTIVE | Noted: 2019-01-01

## 2019-01-06 PROBLEM — K55.9 ISCHEMIC COLITIS (H): Status: ACTIVE | Noted: 2019-01-01

## 2019-01-06 NOTE — CONSULTS
Colon & Rectal Surgery Consult Note    CC: ischemic colitis    HPI:   Tita Escobar is a 86 year old female with hypertension, hyperlipidemia, CVA and recent TIA (2018), currently on Pradaxa for chronic and not rate controlled A Fib, who presented to the ED for evaluation of abdominal pain and distension, and vomiting. The patient is confused and daughter is at bedside to assist with collection of the history.  She reports onset of abdominal distension x 2-3 days, as well as abdominal pain, R>L.  This morning, she had one large episode of brown emesis and, when daughter discovered this, they brought her to the ER for evaluation.   She reports last passing flatus yesterday and daughter reports seeing BM in the toilet at her facility, unsure of when occurred or if bloody.  She reports a normal diet of recent.  She lives in an assisted living facility, and a number of the residents on a different floor are suffering from the Flu.    She reports last experiencing this pain when she had appendicitis years ago, but she is very confused.    Her surgical history is notable for an open appendectomy, open cholecystectomy, and lower vertical midline  scar.        PREVIOUS MEDICAL HISTORY:    Past Medical History:   Diagnosis Date     Asthma      Chronic kidney disease     acute kidney failure     CVA (cerebral vascular accident) (H)      Dehydration      Depressive disorder      E. coli UTI      Gastroesophageal reflux disease      Heart disease      Hyperkalemia      Hyperlipidaemia      Hypertension      Hyponatremia      Mild cognitive impairment      Persistent atrial fibrillation (H)      Rupture of uterus      UTI (urinary tract infection)        PREVIOUS SURGICAL HISTORY:    Past Surgical History:   Procedure Laterality Date     HYSTEROSCOPY  2014    x 2      KNEE SURGERY         CURRENT MEDICATIONS:      No current facility-administered medications on file prior to encounter.   Current  Outpatient Medications on File Prior to Encounter:  acetaminophen (TYLENOL) 325 MG tablet Take 650 mg by mouth every 4 hours as needed for mild pain   acetaminophen (TYLENOL) 325 MG tablet Take 650 mg by mouth daily    albuterol (PROAIR HFA, PROVENTIL HFA, VENTOLIN HFA) 108 (90 BASE) MCG/ACT inhaler Inhale 2 puffs into the lungs every 4 hours as needed    dabigatran ANTICOAGULANT (PRADAXA ANTICOAGULANT) 150 MG CAPS capsule Take 1 capsule (150 mg) by mouth 2 times daily   diltiazem (TIAZAC) 180 MG 24 hr ER beaded capsule Take 2 capsules (360 mg) by mouth daily Two tabs daily in am   diphenhydrAMINE (DIPHENHIST) 25 MG capsule Take 25 mg by mouth 2 times daily as needed for itching or allergies    hypromellose-dextran (ARTIFICAL TEARS) 0.1-0.3 % ophthalmic solution Place 1 drop into both eyes every hour as needed for dry eyes   melatonin 5 MG tablet Take 5 mg by mouth nightly as needed for sleep   prazosin (MINIPRESS) 1 MG capsule Take 1 capsule (1 mg) by mouth 2 times daily   Psyllium (NATURAL FIBER LAXATIVE PO) Take 1 teaspoonful by mouth daily as needed   ramipril (ALTACE) 10 MG capsule Take 1 capsule (10 mg) by mouth 2 times daily   Sennosides (SENNA) 8.6 MG CAPS Take 8.6 mg by mouth daily    sertraline (ZOLOFT) 25 MG tablet Take 25 mg by mouth daily   simethicone (MYLICON) 80 MG chewable tablet Take 80 mg by mouth every morning   simethicone (MYLICON) 80 MG chewable tablet Take 80 mg by mouth every 6 hours as needed for flatulence or cramping   spironolactone (ALDACTONE) 25 MG tablet Take 12.5 mg by mouth daily 1/2 tablet x 25mg =12.5mg         Current Facility-Administered Medications:      0.9% sodium chloride BOLUS, 1,000 mL, Intravenous, Once, Kaveh Salas MD     acetaminophen (TYLENOL) tablet 650 mg, 650 mg, Oral, Q4H PRN, Kaveh Salas MD     HYDROmorphone (PF) (DILAUDID) injection 0.2 mg, 0.2 mg, Intravenous, Q4H PRN, Kaveh Salas MD     lidocaine (LMX4) cream, , Topical, Q1H PRN,  Kaveh Salas MD     lidocaine 1 % 1 mL, 1 mL, Other, Q1H PRN, Kaveh Salas MD     naloxone (NARCAN) injection 0.1-0.4 mg, 0.1-0.4 mg, Intravenous, Q2 Min PRN, Kaveh Salas MD     nitroGLYcerin (NITROSTAT) sublingual tablet 0.4 mg, 0.4 mg, Sublingual, Q5 Min PRN, Kaveh Salas MD     ondansetron (ZOFRAN-ODT) ODT tab 4 mg, 4 mg, Oral, Q6H PRN **OR** ondansetron (ZOFRAN) injection 4 mg, 4 mg, Intravenous, Q6H PRN, Kaveh Salas MD     piperacillin-tazobactam (ZOSYN) 2.25 g vial to attach to  ml bag, 2.25 g, Intravenous, Q6H, Kaveh Salas MD     potassium chloride (KLOR-CON) Packet 20-40 mEq, 20-40 mEq, Oral or Feeding Tube, Q2H PRN, Kaveh Salas MD     potassium chloride 10 mEq in 100 mL intermittent infusion with 10 mg lidocaine, 10 mEq, Intravenous, Q1H PRN, Kaveh Salas MD     potassium chloride 10 mEq in 100 mL sterile water intermittent infusion (premix), 10 mEq, Intravenous, Q1H PRN, Kaveh Salas MD     potassium chloride 20 mEq in 50 mL intermittent infusion, 20 mEq, Intravenous, Q1H PRN, Kaveh Salas MD     potassium chloride ER (K-DUR/KLOR-CON M) CR tablet 20-40 mEq, 20-40 mEq, Oral, Q2H PRN, Kaveh Salas MD     sodium chloride (PF) 0.9% PF flush 3 mL, 3 mL, Intracatheter, Q1H PRN, Kaveh Salas MD     sodium chloride (PF) 0.9% PF flush 3 mL, 3 mL, Intracatheter, Q8H, Kaveh Salas MD     sodium chloride 0.9% infusion, , Intravenous, Continuous, Kaveh Salas MD, Last Rate: 125 mL/hr at 01/06/19 1500      ALLERGIES:    Allergies as of 01/06/2019 - Reviewed 01/06/2019   Allergen Reaction Noted     Benicar [olmesartan] Shortness Of Breath 12/11/2015     Aspirin  01/06/2019     Augmentin Diarrhea 02/11/2015     Flecainide  02/11/2015     Hctz [hydrochlorothiazide]  01/06/2019     Latex  02/11/2015     Metoprolol  01/06/2019     Norvasc [amlodipine]  01/06/2019     Vasotec [enalapril]  01/06/2019     Warfarin  01/06/2019  "      SOCIAL HISTORY:    Social History     Socioeconomic History     Marital status:      Spouse name: Not on file     Number of children: Not on file     Years of education: Not on file     Highest education level: Not on file   Social Needs     Financial resource strain: Not on file     Food insecurity - worry: Not on file     Food insecurity - inability: Not on file     Transportation needs - medical: Not on file     Transportation needs - non-medical: Not on file   Occupational History     Not on file   Tobacco Use     Smoking status: Former Smoker     Last attempt to quit: 1982     Years since quittin.0     Smokeless tobacco: Never Used   Substance and Sexual Activity     Alcohol use: Yes     Comment: occasional     Drug use: No     Sexual activity: Not on file   Other Topics Concern     Parent/sibling w/ CABG, MI or angioplasty before 65F 55M? Not Asked      Service Not Asked     Blood Transfusions Not Asked     Caffeine Concern No     Comment: 1-2 cups coffee in the morning     Occupational Exposure Not Asked     Hobby Hazards Not Asked     Sleep Concern No     Stress Concern Yes     Comment: states she always has been high stressed     Weight Concern Yes     Special Diet No     Comment: watches what she eats     Back Care Not Asked     Exercise Yes     Comment: 45 min speed walking or jogging     Bike Helmet Not Asked     Seat Belt Yes     Self-Exams Not Asked   Social History Narrative     Not on file       FAMILY HISTORY:   Family History   Problem Relation Age of Onset     Heart Disease Father      Hypertension Father      Hypertension Sister        REVIEW OF SYSTEMS: Ten system review negative. Positives per HPI.    PHYSICAL EXAMINATION:    /79   Pulse 98   Temp 98.4  F (36.9  C) (Oral)   Resp 25   Ht 1.575 m (5' 2\")   Wt 78 kg (172 lb)   SpO2 97%   BMI 31.46 kg/m    Temp: 98.4  F (36.9  C) Temp src: Oral BP: 133/79 Pulse: 98 Heart Rate: 108 Resp: 25 SpO2: 97 % O2 " Device: Nasal cannula Oxygen Delivery: 2 LPM    GENERAL: confused, pale, mild distress 2/2 pain but overall pleasant  Head: NCAT, NGT in and draining gastric contents  Eyes: sclera nonicteric  Resp: on RA w no increased WOB  CV: irregularly irregular  ABD: soft, moderately distended, tender to palpation diffusely but R>L w focal peritonitis in the RLQ; well healed chely/appy/c section scars w no incisional hernias  RECTAL: deferred  EXT: warm, well perfused, 1+ edema of RLE,  No edema of LLE  NEURO: Awake, alert, not oriented  PSYCH: cooperative, pleasant    LABS:   I personally reviewed the EKG tracing showing a fib w right bundle branch block.  Results for orders placed or performed during the hospital encounter of 01/06/19 (from the past 24 hour(s))   CBC with platelets differential   Result Value Ref Range    WBC 32.4 (H) 4.0 - 11.0 10e9/L    RBC Count 4.46 3.8 - 5.2 10e12/L    Hemoglobin 14.1 11.7 - 15.7 g/dL    Hematocrit 39.3 35.0 - 47.0 %    MCV 88 78 - 100 fl    MCH 31.6 26.5 - 33.0 pg    MCHC 35.9 31.5 - 36.5 g/dL    RDW 13.1 10.0 - 15.0 %    Platelet Count 272 150 - 450 10e9/L    Diff Method Automated Method     % Neutrophils 89.0 %    % Lymphocytes 4.7 %    % Monocytes 5.6 %    % Eosinophils 0.0 %    % Basophils 0.1 %    % Immature Granulocytes 0.6 %    Nucleated RBCs 0 0 /100    Absolute Neutrophil 28.9 (H) 1.6 - 8.3 10e9/L    Absolute Lymphocytes 1.5 0.8 - 5.3 10e9/L    Absolute Monocytes 1.8 (H) 0.0 - 1.3 10e9/L    Absolute Eosinophils 0.0 0.0 - 0.7 10e9/L    Absolute Basophils 0.0 0.0 - 0.2 10e9/L    Abs Immature Granulocytes 0.2 0 - 0.4 10e9/L    Absolute Nucleated RBC 0.0     Ovalocytes Slight     Platelet Estimate       Automated count confirmed.  Platelet morphology is normal.   Comprehensive metabolic panel   Result Value Ref Range    Sodium 127 (L) 133 - 144 mmol/L    Potassium 4.2 3.4 - 5.3 mmol/L    Chloride 91 (L) 94 - 109 mmol/L    Carbon Dioxide 26 20 - 32 mmol/L    Anion Gap 10 3 - 14  mmol/L    Glucose 118 (H) 70 - 99 mg/dL    Urea Nitrogen 24 7 - 30 mg/dL    Creatinine 1.16 (H) 0.52 - 1.04 mg/dL    GFR Estimate 43 (L) >60 mL/min/[1.73_m2]    GFR Estimate If Black 49 (L) >60 mL/min/[1.73_m2]    Calcium 9.1 8.5 - 10.1 mg/dL    Bilirubin Total 0.8 0.2 - 1.3 mg/dL    Albumin 3.2 (L) 3.4 - 5.0 g/dL    Protein Total 7.3 6.8 - 8.8 g/dL    Alkaline Phosphatase 68 40 - 150 U/L    ALT 21 0 - 50 U/L    AST 17 0 - 45 U/L   Lipase   Result Value Ref Range    Lipase 63 (L) 73 - 393 U/L   ABO/Rh type and screen   Result Value Ref Range    ABO O     RH(D) Pos     Antibody Screen Neg     Test Valid Only At [facility]        Specimen Expires 01/09/2019    INR   Result Value Ref Range    INR 2.72 (H) 0.86 - 1.14   Lactic acid   Result Value Ref Range    Lactic Acid 3.2 (H) 0.4 - 2.0 mmol/L   Blood culture   Result Value Ref Range    Specimen Description Blood Left Arm     Special Requests Aerobic and anaerobic bottles received     Culture Micro No growth after 2 hours    Partial thromboplastin time   Result Value Ref Range    PTT 98 (H) 22 - 37 sec   EKG 12-lead, tracing only   Result Value Ref Range    Interpretation ECG Click View Image link to view waveform and result    Blood culture   Result Value Ref Range    Specimen Description Blood Left Arm     Special Requests Aerobic and anaerobic bottles received     Culture Micro No growth after 1 hour    CT Abdomen Pelvis w Contrast    Narrative    CT ABDOMEN PELVIS WITH CONTRAST 1/6/2019 11:47 AM    TECHNIQUE: Images from diaphragm to pubic symphysis 86 mL [id number]  Radiation dose for this scan was reduced using automated exposure  control, adjustment of the mA and/or kV according to patient size, or  iterative reconstruction technique.    HISTORY: Abdominal pain, unspecified; abdomen infection (incl  peritonitis); tachy, diaphoretic, severe abd pain, vomiting    COMPARISON: 10/2/2008 CT abdomen and pelvis    FINDINGS:   Abdomen and Pelvis: The  hepatic flexure of the colon and ascending  colon are mildly tortuous and demonstrate prominent bowel wall  thickening. Curvilinear lucencies suspicious for pneumatosis although  possibly in the peripheral lumen of the bowel. There is prominent  fluid and mesenteric stranding in the right abdomen and small amount  of free fluid in the pelvis. Differential diagnosis includes  nonspecific colitis such as infectious or inflammatory process but  worrisome for ischemic colon which is also in the differential  diagnosis. No evidence for portal venous gas in the liver. Results  discussed with Dr. Garcia at 12:00 noon, 1/6/2019. Small perihepatic  ascites.    Cholecystectomy clips. No focal liver lesions. Normal-appearing  spleen, pancreas, and left kidney. Mild adrenal thickening. Right  renal cysts, right kidney otherwise appears normal.    Subpleural dependent atelectasis posterior lung bases bilaterally.  Moderate atherosclerotic vascular calcification without abdominal  aortic aneurysm or periaortic or pelvic adenopathy. Multilevel  degenerative changes lumbar spine.        Impression    IMPRESSION: Prominent right colon bowel wall thickening with  mesenteric edema or inflammation and adjacent free fluid with  suspicion for pneumatosis. Concerning for ischemic bowel but prominent  colitis of infectious or inflammatory etiology also the differential  diagnosis. Fluid identified in mildly distended transverse colon.                 FREDI TAYLOR MD   Lactic acid   Result Value Ref Range    Lactic Acid 1.6 0.4 - 2.0 mmol/L     Most Recent 3 CBC's:  Recent Labs   Lab Test 01/06/19  1000 12/25/18  1455 04/19/17  1537   WBC 32.4* 7.4 10.3   HGB 14.1 13.0 15.1   MCV 88 90 99    272 359     Most Recent 3 BMP's:  Recent Labs   Lab Test 01/06/19  1000 12/25/18  1455 04/19/17  1537   * 130* 129*   POTASSIUM 4.2 5.0 4.6   CHLORIDE 91* 95 97*   CO2 26 29 27   BUN 24 19 19   CR 1.16* 0.99 1.19   ANIONGAP 10 6 9.6    MARK 9.1 8.7 8.8   * 93 81     Most Recent 2 LFT's:  Recent Labs   Lab Test 01/06/19  1000 12/25/18  1455   AST 17 19   ALT 21 18   ALKPHOS 68 93   BILITOTAL 0.8 0.3     Most Recent 3 INR's:  Recent Labs   Lab Test 01/06/19  1000 12/07/10  1110 11/11/10  1445   INR 2.72* 1.20* 2.37*       IMAGING: CT scan reviewed.  The hepatic flexure of the colon and ascending  colon are mildly tortuous and demonstrate prominent bowel wall  thickening. Curvilinear lucencies suspicious for pneumatosis although  possibly in the peripheral lumen of the bowel. There is prominent  fluid and mesenteric stranding in the right abdomen and small amount  of free fluid in the pelvis.      ASSESSMENT:  86yoF w atrial fibrillation on therapeutic anticoagulation, presenting with acute abdominal pain w distension, an impressive leukocytosis, hyponatremia, and peritonitis on examination.      PLAN: To OR for exploratory laparotomy, likely colonic resection, likely stoma.  Will need anticoagulation reversed; discussed with primary medical team and pharmacy, will reverse with idarucizumab and K centra.  Zosyn, NPO, NGT to LIWS, IVF resuscitation with additional bolus of NS.    Discussed with primary medical team and family that is at bedside.    Discussed with Dr. Armenta.    --  Kaylee Lagos MD  Colon and Rectal Surgery Fellow  [facility]   Pager: [phone]

## 2019-01-06 NOTE — ED PROVIDER NOTES
"  History     Chief Complaint:  Vomiting      Vomiting   Associated symptoms: abdominal pain    Associated symptoms: no chills, no diarrhea and no fever       History is limited due to patient's altered mental status.    Tita Escobar is a 86 year old female with a history of hypertension, hyperlipidemia, CVA, and currently on Pradaxa for A Fib who presents to the ED for evaluation of vomiting. The patient reports having constant, cramping generalized abdominal pain for a couple days now. This morning, she had one large episode of brown emesis; she denies it being coffee grounds. Here in the ED, her abdominal pain is worse in right lower quadrant. She is very diaphoretic and confused here, stating that she has been in the hospital \"for days.\" The patient denies any diarrhea and is having constipation but states this is a chronic condition. She denies any chest pain, shortness of breath, fever, dizziness, lightheadedness, or any other symptoms.    Allergies:  Benicar  Aspirin  Augmentin  Flecainide  hydrochlorothiazide  Latex  Metoprolol  Norvasc  Vasotec  Warfarin     Medications:    acetaminophen (TYLENOL) 325 MG tablet  albuterol (PROAIR HFA, PROVENTIL HFA, VENTOLIN HFA) 108 (90 BASE) MCG/ACT inhaler  dabigatran ANTICOAGULANT (PRADAXA ANTICOAGULANT) 150 MG CAPS capsule  diltiazem (TIAZAC) 180 MG 24 hr ER beaded capsule  diphenhydrAMINE (DIPHENHIST) 25 MG capsule  Fexofenadine HCl (ALLEGRA PO)  Hypromellose (ARTIFICIAL TEARS OP)  Inulin (FIBER CHOICE PO)  prazosin (MINIPRESS) 1 MG capsule  ramipril (ALTACE) 10 MG capsule  Sennosides (SENNA) 8.6 MG CAPS  sertraline (ZOLOFT) 25 MG tablet  Simethicone (GAS-X EXTRA STRENGTH PO)  spironolactone (ALDACTONE) 25 MG tablet     Past Medical History:    Asthma  CKD  CVA  Dehydration  Depressive disorder  E. Coli UTI  GERD  Heart disease  Hyperkalemia  Hyperlipidemia  Hypertension  Hyponatremia  Mild cognitive impairment  Persistent atrial fibrillation  Rupture of " "uterus    Past Surgical History:    Hysteroscopy x2  Knee surgery    Family History:    Heart disease  Hypertension    Social History:  Smoking status: Former  Alcohol use: Occasionally   Marital Status:       Review of Systems   Constitutional: Positive for diaphoresis. Negative for chills and fever.   Respiratory: Negative for shortness of breath.    Cardiovascular: Negative for chest pain.   Gastrointestinal: Positive for abdominal pain, constipation (chronic), nausea and vomiting. Negative for blood in stool and diarrhea.   Neurological: Negative for dizziness and light-headedness.       Physical Exam     Patient Vitals for the past 24 hrs:   BP Temp Temp src Pulse Heart Rate Resp SpO2 Height Weight   01/06/19 1200 133/79 -- -- 98 108 25 97 % -- --   01/06/19 1115 143/80 -- -- -- 101 21 98 % -- --   01/06/19 1100 140/77 -- -- 107 92 22 98 % -- --   01/06/19 1050 -- -- -- 103 -- -- -- -- --   01/06/19 1048 -- -- -- 103 117 23 99 % -- --   01/06/19 1045 -- -- -- 94 103 22 91 % -- --   01/06/19 1040 -- -- -- 132 113 20 (!) 89 % -- --   01/06/19 1035 -- -- -- 125 -- -- -- -- --   01/06/19 1030 -- -- -- 103 -- -- -- -- --   01/06/19 1025 150/78 -- -- 105 111 24 91 % -- --   01/06/19 1015 -- -- -- -- 113 21 94 % -- --   01/06/19 1000 -- -- -- -- -- -- 93 % -- --   01/06/19 0949 125/89 98.4  F (36.9  C) Oral 117 -- 22 93 % 1.575 m (5' 2\") 78 kg (172 lb)     Physical Exam  General/Appearance: appears stated age, well-groomed, appears uncomfortable and diaphoretic, pale  Eyes: EOMI, no scleral injection, no icterus  ENT: MMM  Neck: supple, nl ROM, no stiffness  Cardiovascular: irregularly irregular and tachy, nl S1S2, no m/r/g, 2+ pulses in all 4 extremities, cap refill <2sec  Respiratory: CTAB, good air movement throughout, no wheezes/rhonchi/rales, no increased WOB, no retractions  Back: no lesions  GI: abd soft, non-distended, diffuse mild ttp with rebound ttp to BLQ, decreased BS  MSK: FALL, good tone, no bony " "abnormality  Skin: warm and well-perfused, no rash, no edema, no ecchymosis, nl turgor  Neuro: GCS 15, alert but with some confusion on orientation (\"I've already been here for days\")  Psych: interacts appropriately  Heme: no petechia, no purpura, no active bleeding    Emergency Department Course   ECG (10:10:38):  Rate 105 bpm. NY interval *. QRS duration 136. QT/QTc 306/404. P-R-T axes * 88 -73. Atrial fibrillation with rapid ventricular response. Right bundle branch block. T wave abnormality, consider inferolateral ischemia. Abnormal ECG. Interpreted by Elisa Garcia MD.    Imaging:  Radiographic findings were communicated with the patient who voiced understanding of the findings.    CT-scan Abdomen/Pelvis w/ contrast:  IMPRESSION: Prominent right colon bowel wall thickening with  mesenteric edema or inflammation and adjacent free fluid with  suspicion for pneumatosis. Concerning for ischemic bowel but prominent  colitis of infectious or inflammatory etiology also the differential  diagnosis. Fluid identified in mildly distended transverse colon.    Preliminary result per radiology.     Laboratory:  1000 - Lactic Acid: 3.2 (H)  1151 - Lactic Acid: 1.6  Blood Culture x2: Pending  CBC: WBC 32.4 (H) o/w WNL (HGB 14.1, )  CMP:  (H), Chloride 91 (L), Glucose 118 (H), Creatinine 1.16 (H), GFR 43 (L), Albumin 3.2 (L) o/w WNL  Lipase: 63 (L)  ABO/Rh type and screen: O POs Neg antibody  INR: 2.72 (H)  PTT: 98 (H)    Interventions:  Medications   sodium chloride 0.9% infusion (not administered)   0.9% sodium chloride BOLUS (0 mLs Intravenous Stopped 1/6/19 1131)   piperacillin-tazobactam (ZOSYN) 3.375 g vial to attach to  mL bag (0 g Intravenous Stopped 1/6/19 1110)   iopamidol (ISOVUE-370) solution 86 mL (86 mLs Intravenous Given 1/6/19 1142)   Saline flush (66 mLs Intravenous Given 1/6/19 1143)   1033: NS 2L IV Bolus  1040: Zosyn 3.375 g vial to attach to  mL IV bag    Emergency " Department Course:  The patient arrived in the emergency department via EMS.  Past medical records, nursing notes, and vitals reviewed.  1011: I performed an exam of the patient and obtained history, as documented above. GCS 15.    IV inserted and blood drawn. The patient was placed on continuous cardiac monitoring and pulse oximetry.    The patient was sent for a CT while in the emergency department, findings above.     1158: I discussed the case with Dr. Seo of radiology regarding the patient's imaging results.    1204: I discussed the case with Dr. Daley of surgery regarding the patient. He called back and said this is a patient for colorectal surgery so we will contact them.    1215: I discussed the case with Dr. Lagos of colorectal surgery regarding the patient. She requested NG tube placement for decompression.    Findings and plan explained to the Patient who consents to admission.     1230: Discussed the patient with Dr. Salas, who will admit the patient to a [facility] bed for further monitoring, evaluation, and treatment.     Impression & Plan    CMS Diagnoses:  The patient has signs of Severe Sepsis as evidenced by:    1. 2 SIRS criteria, AND  2. Suspected infection, AND   3. Organ dysfunction: Lactic Acid > 1.9    Time severe sepsis diagnosis confirmed = 1000 as this was the time when Lactate resulted, and the level was > 1.9      3 Hour Severe Sepsis Bundle Completion:  1. Initial Lactic Acid Result:   Recent Labs   Lab Test 01/06/19  1151 01/06/19  1000   LACT 1.6 3.2*     2. Blood Cultures before Antibiotics: Yes  3. Broad Spectrum Antibiotics Administered: Yes     Anti-infectives (From now, onward)    None        4. 2000 ml of IV fluids.  Ideal body weight: 50.1 kg (110 lb 7.2 oz)  Adjusted ideal body weight: 61.3 kg (135 lb 1.1 oz)    Severe Sepsis reassessment:  1. Repeat Lactic Acid Level: 1.6  2. MAP>65 after initial IVF bolus, will continue to monitor fluid status and vital signs          Medical Decision Making:  This patient is an 86-year-old female on Pradaxa for A. fib who presents with abdominal pain and vomiting.  The patient has some confusion here in the ER so it is not totally clear how long she has been having the abdominal pain for.  It does appear to be primarily right-sided per her history and physical exam.  Workup is been concerning for colitis and possible ischemic gut.  Her white count is markedly elevated at 32,000.  She has not been having diarrhea per our knowledge.  Her lactate was elevated initially at 3.2.  CT of the abdomen was obtained which reads as colitis but also with the possibility of pneumatosis, concerning for possible ischemia.  I spoke with colorectal surgery who asked us to have fluids and antibiotics, both of which had already been given, as well as place an NG tube to help decompress the stomach in case there is ischemia going on.  After 2 L of fluid the patient's heart rate is both markedly improved as well as the lactate is come down to normal at 1.6.  Clinically she looks better.  She has been started on Zosyn.  She will be coming in to the hospitalist service with colorectal as consultants.  Diagnosis:    ICD-10-CM    1. Severe sepsis (H) A41.9 Blood culture    R65.20 Blood culture   2. Colitis K52.9    3. Atrial fibrillation with RVR (H) I48.91        Disposition:  Admitted to Dr. Salas    Discharge Medications:     Medication List      There are no discharge medications for this visit.           Mercedes Mariee  1/6/2019    EMERGENCY DEPARTMENT  I, Mercedes Mariee, am serving as a scribe at 10:11 AM on 1/6/2019 to document services personally performed by Elisa Garcia MD based on my observations and the provider's statements to me.        Elisa Garcia MD  01/06/19 [id number]       Elisa Garcia MD  01/06/19 1399

## 2019-01-06 NOTE — ANESTHESIA PREPROCEDURE EVALUATION
Anesthesia Pre-Procedure Evaluation    Patient: Tita Escobar   MRN: [id number] : 1932          Preoperative Diagnosis: unknown    Procedure(s):  LAPAROTOMY EXPLORATORY, possible bowel resection, possible stoma  COMBINED COLECTOMY WITH COLOSTOMY    Past Medical History:   Diagnosis Date     Asthma      Chronic kidney disease     acute kidney failure     CVA (cerebral vascular accident) (H)      Dehydration      Depressive disorder      E. coli UTI      Gastroesophageal reflux disease      Heart disease      Hyperkalemia      Hyperlipidaemia      Hypertension      Hyponatremia      Mild cognitive impairment      Persistent atrial fibrillation (H)      Rupture of uterus      UTI (urinary tract infection)      Past Surgical History:   Procedure Laterality Date     HYSTEROSCOPY  2014    x 2      KNEE SURGERY         Anesthesia Evaluation     . Pt has had prior anesthetic.     No history of anesthetic complications          ROS/MED HX    ENT/Pulmonary:     (+)asthma , . .   (-) tobacco use and sleep apnea   Neurologic:     (+)CVA     Cardiovascular:     (+) Dyslipidemia, hypertension----. : . . . :. .       METS/Exercise Tolerance:     Hematologic:         Musculoskeletal:         GI/Hepatic:     (+) GERD       Renal/Genitourinary:     (+) chronic renal disease,       Endo:         Psychiatric:         Infectious Disease:         Malignancy:         Other: Comment: Hyponatremia  sepsis                         Physical Exam  Normal systems: dental    Airway   Mallampati: II  TM distance: >3 FB  Neck ROM: full    Dental     Cardiovascular   Rhythm and rate: regular and normal      Pulmonary    breath sounds clear to auscultation            Lab Results   Component Value Date    WBC 32.4 (H) 2019    HGB 14.1 2019    HCT 39.3 2019     2019     (L) 2019    POTASSIUM 4.2 2019    CHLORIDE 91 (L) 2019    CO2 26 2019    BUN 24 2019    CR 1.16  "(H) 01/06/2019     (H) 01/06/2019    MARK 9.1 01/06/2019    MAG 2.0 12/07/2010    ALBUMIN 3.2 (L) 01/06/2019    PROTTOTAL 7.3 01/06/2019    ALT 21 01/06/2019    AST 17 01/06/2019    ALKPHOS 68 01/06/2019    BILITOTAL 0.8 01/06/2019    LIPASE 63 (L) 01/06/2019    PTT 98 (H) 01/06/2019    INR 2.72 (H) 01/06/2019    TSH 1.48 04/19/2017       Preop Vitals  BP Readings from Last 3 Encounters:   01/06/19 133/79   12/25/18 115/78   07/17/18 156/74    Pulse Readings from Last 3 Encounters:   01/06/19 98   12/25/18 101   07/17/18 56      Resp Readings from Last 3 Encounters:   01/06/19 25   12/25/18 16    SpO2 Readings from Last 3 Encounters:   01/06/19 97%   12/25/18 100%   04/10/15 95%      Temp Readings from Last 1 Encounters:   01/06/19 36.9  C (98.4  F) (Oral)    Ht Readings from Last 1 Encounters:   01/06/19 1.575 m (5' 2\")      Wt Readings from Last 1 Encounters:   01/06/19 78 kg (172 lb)    Estimated body mass index is 31.46 kg/m  as calculated from the following:    Height as of this encounter: 1.575 m (5' 2\").    Weight as of this encounter: 78 kg (172 lb).       Anesthesia Plan      History & Physical Review  History and physical reviewed and following examination; no interval change.    ASA Status:  3 emergent.        Plan for General, RSI and ETT with Intravenous induction. Maintenance will be Balanced.    PONV prophylaxis:  Ondansetron (or other 5HT-3) and Dexamethasone or Solumedrol  Additional equipment: Videolaryngoscope and 2nd IV      Postoperative Care  Postoperative pain management:  IV analgesics.      Consents  Anesthetic plan, risks, benefits and alternatives discussed with:  Patient.  Use of blood products discussed: Yes.   Use of blood products discussed with Patient.  .                 Radha Smith  "

## 2019-01-06 NOTE — PHARMACY-CONSULT NOTE
Pharmacy consulted to place order for Dabigatran (PRADAXA) reversal.      Order for Idarucizumab (PRAXBIND) 2.5 g x5 minutes x 2 placed.      Dabigatran can be reinitiated 24 hpurs after administration of Idarucizumab (PRAXBIND).    Jj KleinD

## 2019-01-06 NOTE — H&P
Admitted:     01/06/2019      PRIMARY CARDIOLOGIST:  Dr. Colon.      CHIEF COMPLAINT:  Abdominal pain.      HISTORY OF PRESENT ILLNESS:  Ms. Tita Escobar is an 86-year-old female with past history of chronic atrial fibrillation on chronic Pradaxa, history of CVA, hypertension, and hyperlipidemia who presents to the emergency room with abdominal pain.  On questioning further, the patient mentioned that her abdominal pain started on [place] and has been persistent since then.  She has not been able to eat much because of the pain.  Unclear why she has not sought any medical attention since then.  Reportedly, this morning, her pain got worse.  It was 9/10 in intensity, associated with 1 episode of large emesis.  There was no coffee ground emesis or no hematochezia or melena.  She was also reportedly sweaty at that time and very nauseous and was brought to the emergency room.  On questioning further, she denies any fever or chills.  No diarrhea.  Her last bowel movement was yesterday and reportedly was normal.  In the emergency room, initially, she was felt to be a little confused, but that has since cleared and gotten better.  She denies any chest pain, no shortness of breath.  Denies any lightheadedness or dizziness.  She does feel very weak overall.      In the emergency room, the patient was evaluated by Dr. Garcia.  Basic lab work showed a sodium of 127, creatinine of 1.16, and an initial lactate of 3.2, which improved with IV fluids to 1.6.  Her initial white cell count was 32,000.  INR was 2.72.  CT abdomen showed prominent right colon bowel wall thickening with mesenteric edema or inflammation and adjacent free fluid with suspicion for pneumatosis, concerning for ischemic bowel, but prominent colitis of infectious or inflammatory etiology also on the differential diagnosis.  She also was found to be in rapid atrial fibrillation.      PAST MEDICAL HISTORY:   1.  Chronic atrial fibrillation on chronic  Pradaxa.   2.  Hypertension.   3.  Cerebrovascular accident.   4.  Chronic kidney disease, stage III.   5.  Hyponatremia.   6.  Mild cognitive impairment.   7.  Asthma.      ALLERGIES:     1.  BENICAR.   2.  ASPIRIN.   3.  AUGMENTIN -- REPORTEDLY, SHE HAS DIARRHEA WITH AUGMENTIN, UNCLEAR IF THAT IS A TRUE ALLERGY.   4.  FLECAINIDE.   5.  HYDROCHLOROTHIAZIDE.   6.  METOPROLOL.   7.  NORVASC.   8.  VASOTEC.   9.  WARFARIN.      SOCIAL AND PERSONAL HISTORY:  She lives in an assisted living, no tobacco, alcohol, or recreational drug use.      FAMILY HISTORY:  Reviewed and is noncontributory.      HOME MEDICATIONS:   Prior to Admission Medications   Prescriptions Last Dose Informant Patient Reported? Taking?   Psyllium (NATURAL FIBER LAXATIVE PO) PRN  Yes Yes   Sig: Take 1 teaspoonful by mouth daily as needed   Sennosides (SENNA) 8.6 MG CAPS 1/6/2019 at 0800  Yes Yes   Sig: Take 8.6 mg by mouth daily    acetaminophen (TYLENOL) 325 MG tablet 1/5/2019 at 2000  Yes Yes   Sig: Take 650 mg by mouth daily    acetaminophen (TYLENOL) 325 MG tablet PRN  Yes Yes   Sig: Take 650 mg by mouth every 4 hours as needed for mild pain   albuterol (PROAIR HFA, PROVENTIL HFA, VENTOLIN HFA) 108 (90 BASE) MCG/ACT inhaler PRN  Yes Yes   Sig: Inhale 2 puffs into the lungs every 4 hours as needed    dabigatran ANTICOAGULANT (PRADAXA ANTICOAGULANT) 150 MG CAPS capsule 1/6/2019 at 0800  No Yes   Sig: Take 1 capsule (150 mg) by mouth 2 times daily   diltiazem (TIAZAC) 180 MG 24 hr ER beaded capsule 1/6/2019 at 0800  No Yes   Sig: Take 2 capsules (360 mg) by mouth daily Two tabs daily in am   diphenhydrAMINE (DIPHENHIST) 25 MG capsule PRN  Yes Yes   Sig: Take 25 mg by mouth 2 times daily as needed for itching or allergies    hypromellose-dextran (ARTIFICAL TEARS) 0.1-0.3 % ophthalmic solution PRN  Yes Yes   Sig: Place 1 drop into both eyes every hour as needed for dry eyes   melatonin 5 MG tablet PRN  Yes Yes   Sig: Take 5 mg by mouth nightly as  needed for sleep   prazosin (MINIPRESS) 1 MG capsule 1/6/2019 at 0800  No Yes   Sig: Take 1 capsule (1 mg) by mouth 2 times daily   ramipril (ALTACE) 10 MG capsule 1/6/2019 at 0800  No Yes   Sig: Take 1 capsule (10 mg) by mouth 2 times daily   sertraline (ZOLOFT) 25 MG tablet 1/5/2019 at 2000  Yes Yes   Sig: Take 25 mg by mouth daily   simethicone (MYLICON) 80 MG chewable tablet 1/5/2019 at 0800  Yes Yes   Sig: Take 80 mg by mouth every morning   simethicone (MYLICON) 80 MG chewable tablet PRN  Yes Yes   Sig: Take 80 mg by mouth every 6 hours as needed for flatulence or cramping   spironolactone (ALDACTONE) 25 MG tablet 1/6/2019 at 0800  Yes Yes   Sig: Take 12.5 mg by mouth daily 1/2 tablet x 25mg =12.5mg      Facility-Administered Medications: None        REVIEW OF SYSTEMS:  A complete review of system was done and was negative for anything else other than that mentioned in the HPI.      PHYSICAL EXAMINATION:   GENERAL:  Ms. Tita Escobar is an 86-year-old female.  She does appear ill at the moment.   VITAL SIGNS:  Temperature 98.4, blood pressure 133/79, heart rate 108, respiration rate 25, O2 sat 97% on 2 liters nasal cannula.   HEENT:  Atraumatic, normocephalic.  Oral mucosa is dry.   NECK:  Supple, with good range of motion.   RESPIRATORY:  Diminished air entry bilaterally with normal effort of breathing.   CARDIOVASCULAR:  Tachycardic, irregular.   GASTROINTESTINAL:  Abdomen is diffusely tender, more so in the right lower quadrant.  There is no obvious guarding.  Bowel sound is sluggish.   EXTREMITIES:  There is 1+ edema bilaterally.   SKIN:  Warm and dry.   NEUROLOGIC:  She is awake, alert, oriented x3, but she does appear slightly forgetful at times.  Appears to move all 4 extremities.      LABORATORY AND DIAGNOSTIC DATA:  White cell count is 32,000 with initial lactic acid of 3.2, sodium is 127, creatinine is 1.16.  CT abdomen as described above.      ASSESSMENT AND PLAN:  Ms. Tita Escobar is an 86-year-old  female with a history of atrial fibrillation on chronic Pradaxa, hypertension, previous CVA, and mild cognitive impairment who presents to the emergency room with abdominal pain.   1.  Likely ischemic colitis.  The patient presents with abdominal pain, which actually per report has been going on since [place] Day, i.e., 11 days ago.  She does appear to be clinically dehydrated with a significant leukocytosis at 32,000.  Her CT abdomen is concerning for ischemic colitis with prominent right colon bowel wall thickening with mesenteric edema or inflammation and adjacent free fluid with suspicion for pneumatosis.  I did speak with Colorectal Surgery fellow on-call, Dr. Rodriguez, and updated the clinical situation.  They were in the operating room, thus unable to come and see the patient right away.  After discussion with her, she suggested that the patient might need to go to operating room.  The plan is to get Cardiology evaluation for rapid atrial fibrillation.  Please see discussion below.  Also, reverse her anticoagulation for Pradaxa.   -- Continue IV Zosyn.   -- Continue aggressive IV fluid resuscitation with normal saline at 125 mL per hour.   -- [place] [facility]   -- N.p.o.   2.  Atrial fibrillation with rapid ventricular response.  The patient does have chronic AFib and sees Dr. Colon of [facility].  Prior to admission, she is on Pradaxa 150 mg twice daily for anticoagulation and also on Cardizem 360 mg daily.  I will hold her Cardizem for now.  She is tachycardic, but this might be an appropriate response to the amount of stress she is under.  Per Colorectal Surgery, we will get Cardiology evaluation.  I will hold off on any rate controlling medication at this time.   3.  Benign essential hypertension.  Hold her prior to admission ramipril and Cardizem.   4.  Mild cognitive impairment with possible mild acute delirium.  The patient does have history of mild cognitive impairment, and per the daughter,  she does get delirious when she is in unfamiliar surroundings or when she is hospitalized.  We will put her on a delirium protocol and monitor her cognition closely.   5.  Chronic kidney disease, stage III.  Her creatinine has fluctuated between 0.9 and 1.1.  She obviously is a little dry right now, but her creatinine is not that bad at 1.16.  I will see the response to IV fluid resuscitation.   6.  Hyponatremia.  Her sodium is 127 today.  She has had problems with on and off hyponatremia in the past.  We will recheck her sodium in the morning with IV fluid resuscitation.   7.  Long-term anticoagulation use.  The patient is on Pradaxa.  Her INR is 2.72.  There is plan for potential emergent surgery, so I will discuss with his pharmacy to see if we can reverse this.   8.  CODE STATUS:  Full code.  This was confirmed with the post and also with the daughter and son-in-law at the bedside.   9.  Anticipated length of stay:  More than 2 midnights.         GLYNN SEVILLA MD             D: 2019   T: 2019   MT: MS      Name:     SHAHRAM ZAMORA   MRN:      [id number]        Account:      [id number]   :      1932        Admitted:     2019                   Document: [id number]

## 2019-01-06 NOTE — PROVIDER NOTIFICATION
Patterson to be placed pre-procedure per Dr. Escobedo. Order placed by writer via telephone with readback.

## 2019-01-06 NOTE — PHARMACY-ADMISSION MEDICATION HISTORY
Admission medication history interview status for the 1/6/2019  admission is complete. See EPIC admission navigator for prior to admission medications     Medication history source reliability:Good    Actions taken by pharmacist (provider contacted, etc):  PTA list updated from patient's NH medication list [facility].      Additional medication history information not noted on PTA med list :  -Patient reported that she vomited this morning after she took her AM doses.     Medication reconciliation/reorder completed by provider prior to medication history? No    Time spent in this activity: 15 minutes    Prior to Admission medications    Medication Sig Last Dose Taking? Auth Provider   acetaminophen (TYLENOL) 325 MG tablet Take 650 mg by mouth every 4 hours as needed for mild pain PRN Yes Unknown, Entered By History   acetaminophen (TYLENOL) 325 MG tablet Take 650 mg by mouth daily  1/5/2019 at 2000 Yes Reported, Patient   albuterol (PROAIR HFA, PROVENTIL HFA, VENTOLIN HFA) 108 (90 BASE) MCG/ACT inhaler Inhale 2 puffs into the lungs every 4 hours as needed  PRN Yes Reported, Patient   dabigatran ANTICOAGULANT (PRADAXA ANTICOAGULANT) 150 MG CAPS capsule Take 1 capsule (150 mg) by mouth 2 times daily 1/6/2019 at 0800 Yes Radha Reddy APRN CNP   diltiazem (TIAZAC) 180 MG 24 hr ER beaded capsule Take 2 capsules (360 mg) by mouth daily Two tabs daily in am 1/6/2019 at 0800 Yes Radha Reddy APRN CNP   diphenhydrAMINE (DIPHENHIST) 25 MG capsule Take 25 mg by mouth 2 times daily as needed for itching or allergies  PRN Yes Reported, Patient   hypromellose-dextran (ARTIFICAL TEARS) 0.1-0.3 % ophthalmic solution Place 1 drop into both eyes every hour as needed for dry eyes PRN Yes Unknown, Entered By History   melatonin 5 MG tablet Take 5 mg by mouth nightly as needed for sleep PRN Yes Unknown, Entered By History   prazosin (MINIPRESS) 1 MG capsule Take 1 capsule (1 mg) by mouth 2 times daily 1/6/2019 at 0800 Yes  Radha Reddy, APRN CNP   Psyllium (NATURAL FIBER LAXATIVE PO) Take 1 teaspoonful by mouth daily as needed PRN Yes Unknown, Entered By History   ramipril (ALTACE) 10 MG capsule Take 1 capsule (10 mg) by mouth 2 times daily 1/6/2019 at 0800 Yes Radha Reddy, APRN CNP   Sennosides (SENNA) 8.6 MG CAPS Take 8.6 mg by mouth daily  1/6/2019 at 0800 Yes Reported, Patient   sertraline (ZOLOFT) 25 MG tablet Take 25 mg by mouth daily 1/5/2019 at 2000 Yes Reported, Patient   simethicone (MYLICON) 80 MG chewable tablet Take 80 mg by mouth every morning 1/5/2019 at 0800 Yes Unknown, Entered By History   simethicone (MYLICON) 80 MG chewable tablet Take 80 mg by mouth every 6 hours as needed for flatulence or cramping PRN Yes Unknown, Entered By History   spironolactone (ALDACTONE) 25 MG tablet Take 12.5 mg by mouth daily 1/2 tablet x 25mg =12.5mg 1/6/2019 at 0800 Yes Unknown, Entered By History     Penelope Rae, Pharm.D IV Student

## 2019-01-06 NOTE — ED NOTES
Bed: ED05  Expected date:   Expected time:   Means of arrival:   Comments:  Linnea 2 abd pain 86 female

## 2019-01-06 NOTE — ED PROVIDER NOTES
"  History     Chief Complaint:  Vomiting    HPI   History is limited due to patient's altered mental status.    Tita Escobar is a 86 year old female with a history of hypertension, hyperlipidemia, CVA, and currently on Pradaxa for A Fib who presents to the ED for evaluation of vomiting. The patient reports having constant, cramping generalized abdominal pain for a couple days now. This morning, she had one large episode of brown emesis; she denies it being coffee grounds. Here in the ED, her abdominal pain is worse in right lower quadrant. She is very diaphoretic and confused here, stating that she has been in the hospital \"for days.\" The patient denies any diarrhea and is having constipation but states this is a chronic condition. She denies any chest pain, shortness of breath, fever, dizziness, lightheadedness, or any other symptoms.    Allergies:  Benicar  Aspirin  Augmentin  Flecainide  hydrochlorothiazide  Latex  Metoprolol  Norvasc  Vasotec  Warfarin     Medications:    acetaminophen (TYLENOL) 325 MG tablet  albuterol (PROAIR HFA, PROVENTIL HFA, VENTOLIN HFA) 108 (90 BASE) MCG/ACT inhaler  dabigatran ANTICOAGULANT (PRADAXA ANTICOAGULANT) 150 MG CAPS capsule  diltiazem (TIAZAC) 180 MG 24 hr ER beaded capsule  diphenhydrAMINE (DIPHENHIST) 25 MG capsule  Fexofenadine HCl (ALLEGRA PO)  Hypromellose (ARTIFICIAL TEARS OP)  Inulin (FIBER CHOICE PO)  prazosin (MINIPRESS) 1 MG capsule  ramipril (ALTACE) 10 MG capsule  Sennosides (SENNA) 8.6 MG CAPS  sertraline (ZOLOFT) 25 MG tablet  Simethicone (GAS-X EXTRA STRENGTH PO)  spironolactone (ALDACTONE) 25 MG tablet     Past Medical History:    Asthma  CKD  CVA  Dehydration  Depressive disorder  E. Coli UTI  GERD  Heart disease  Hyperkalemia  Hyperlipidemia  Hypertension  Hyponatremia  Mild cognitive impairment  Persistent atrial fibrillation  Rupture of uterus    Past Surgical History:    Hysteroscopy x2  Knee surgery    Family History:    Heart " "disease  Hypertension    Social History:  Smoking status: Former  Alcohol use: Occasionally   Marital Status:       Review of Systems   Constitutional: Positive for diaphoresis. Negative for chills and fever.   Respiratory: Negative for shortness of breath.    Cardiovascular: Negative for chest pain.   Gastrointestinal: Positive for abdominal pain, constipation (chronic), nausea and vomiting. Negative for blood in stool and diarrhea.   Neurological: Negative for dizziness and light-headedness.       Physical Exam     Patient Vitals for the past 24 hrs:   BP Temp Temp src Pulse Heart Rate Resp SpO2 Height Weight   01/06/19 1200 133/79 -- -- 98 108 25 97 % -- --   01/06/19 1115 143/80 -- -- -- 101 21 98 % -- --   01/06/19 1100 140/77 -- -- 107 92 22 98 % -- --   01/06/19 1050 -- -- -- 103 -- -- -- -- --   01/06/19 1048 -- -- -- 103 117 23 99 % -- --   01/06/19 1045 -- -- -- 94 103 22 91 % -- --   01/06/19 1040 -- -- -- 132 113 20 (!) 89 % -- --   01/06/19 1035 -- -- -- 125 -- -- -- -- --   01/06/19 1030 -- -- -- 103 -- -- -- -- --   01/06/19 1025 150/78 -- -- 105 111 24 91 % -- --   01/06/19 1015 -- -- -- -- 113 21 94 % -- --   01/06/19 1000 -- -- -- -- -- -- 93 % -- --   01/06/19 0949 125/89 98.4  F (36.9  C) Oral 117 -- 22 93 % 1.575 m (5' 2\") 78 kg (172 lb)     Physical Exam  General/Appearance: appears stated age, well-groomed, appears uncomfortable and diaphoretic, pale  Eyes: EOMI, no scleral injection, no icterus  ENT: MMM  Neck: supple, nl ROM, no stiffness  Cardiovascular: irregularly irregular and tachy, nl S1S2, no m/r/g, 2+ pulses in all 4 extremities, cap refill <2sec  Respiratory: CTAB, good air movement throughout, no wheezes/rhonchi/rales, no increased WOB, no retractions  Back: no lesions  GI: abd soft, non-distended, diffuse mild ttp with rebound ttp to BLQ, decreased BS  MSK: FALL, good tone, no bony abnormality  Skin: warm and well-perfused, no rash, no edema, no ecchymosis, nl turgor  Neuro: " "GCS 15, alert but with some confusion on orientation (\"I've already been here for days\")  Psych: interacts appropriately  Heme: no petechia, no purpura, no active bleeding    Emergency Department Course   ECG (10:10:38):  Rate 105 bpm. OH interval *. QRS duration 136. QT/QTc 306/404. P-R-T axes * 88 -73. Atrial fibrillation with rapid ventricular response. Right bundle branch block. T wave abnormality, consider inferolateral ischemia. Abnormal ECG. Interpreted by Elisa Garcia MD.    Imaging:  Radiographic findings were communicated with the patient who voiced understanding of the findings.    CT-scan Abdomen/Pelvis w/ contrast:  IMPRESSION: Prominent right colon bowel wall thickening with  mesenteric edema or inflammation and adjacent free fluid with  suspicion for pneumatosis. Concerning for ischemic bowel but prominent  colitis of infectious or inflammatory etiology also the differential  diagnosis. Fluid identified in mildly distended transverse colon.    Preliminary result per radiology.     Laboratory:  1000 - Lactic Acid: 3.2 (H)  1151 - Lactic Acid: 1.6  Blood Culture x2: Pending  CBC: WBC 32.4 (H) o/w WNL (HGB 14.1, )  CMP:  (H), Chloride 91 (L), Glucose 118 (H), Creatinine 1.16 (H), GFR 43 (L), Albumin 3.2 (L) o/w WNL  Lipase: 63 (L)  ABO/Rh type and screen: O POs Neg antibody  INR: 2.72 (H)  PTT: 98 (H)    Interventions:  Medications   sodium chloride 0.9% infusion (not administered)   0.9% sodium chloride BOLUS (0 mLs Intravenous Stopped 1/6/19 1131)   piperacillin-tazobactam (ZOSYN) 3.375 g vial to attach to  mL bag (0 g Intravenous Stopped 1/6/19 1110)   iopamidol (ISOVUE-370) solution 86 mL (86 mLs Intravenous Given 1/6/19 1142)   Saline flush (66 mLs Intravenous Given 1/6/19 1143)   1033: NS 2L IV Bolus  1040: Zosyn 3.375 g vial to attach to  mL IV bag    Emergency Department Course:  The patient arrived in the emergency department via EMS.  Past medical " records, nursing notes, and vitals reviewed.  1011: I performed an exam of the patient and obtained history, as documented above. GCS 15.    IV inserted and blood drawn. The patient was placed on continuous cardiac monitoring and pulse oximetry.    The patient was sent for a CT while in the emergency department, findings above.     1158: I discussed the case with Dr. Seo of radiology regarding the patient's imaging results.    1204: I discussed the case with Dr. Daley of surgery regarding the patient. He called back and said this is a patient for colorectal surgery so we will contact them.    1215: I discussed the case with Dr. Lagos of colorectal surgery regarding the patient. She requested NG tube placement for decompression.    Findings and plan explained to the Patient who consents to admission.     1230: Discussed the patient with Dr. Salas, who will admit the patient to a [facility] bed for further monitoring, evaluation, and treatment.     Impression & Plan    CMS Diagnoses:  The patient has signs of Severe Sepsis as evidenced by:    1. 2 SIRS criteria, AND  2. Suspected infection, AND   3. Organ dysfunction: Lactic Acid > 1.9    Time severe sepsis diagnosis confirmed = 1000 as this was the time when Lactate resulted, and the level was > 1.9      3 Hour Severe Sepsis Bundle Completion:  1. Initial Lactic Acid Result:   Recent Labs   Lab Test 01/06/19  1151 01/06/19  1000   LACT 1.6 3.2*     2. Blood Cultures before Antibiotics: Yes  3. Broad Spectrum Antibiotics Administered: Yes     Anti-infectives (From now, onward)    None        4. 2000 ml of IV fluids.  Ideal body weight: 50.1 kg (110 lb 7.2 oz)  Adjusted ideal body weight: 61.3 kg (135 lb 1.1 oz)    Severe Sepsis reassessment:  1. Repeat Lactic Acid Level: 1.6  2. MAP>65 after initial IVF bolus, will continue to monitor fluid status and vital signs         Medical Decision Making:  This patient is an 86-year-old female on Pradaxa for A. fib who  presents with abdominal pain and vomiting.  The patient has some confusion here in the ER so it is not totally clear how long she has been having the abdominal pain for.  It does appear to be primarily right-sided per her history and physical exam.  Workup is been concerning for colitis and possible ischemic gut.  Her white count is markedly elevated at 32,000.  She has not been having diarrhea per our knowledge.  Her lactate was elevated initially at 3.2.  CT of the abdomen was obtained which reads as colitis but also with the possibility of pneumatosis, concerning for possible ischemia.  I spoke with colorectal surgery who asked us to have fluids and antibiotics, both of which had already been given, as well as place an NG tube to help decompress the stomach in case there is ischemia going on.  After 2 L of fluid the patient's heart rate is both markedly improved as well as the lactate is come down to normal at 1.6.  Clinically she looks better.  She has been started on Zosyn.  She will be coming in to the hospitalist service with colorectal as consultants.  Diagnosis:    ICD-10-CM    1. Severe sepsis (H) A41.9     R65.20    2. Colitis K52.9    3. Atrial fibrillation with RVR (H) I48.91        Disposition:  Admitted to Dr. Salas    Discharge Medications:     Medication List      There are no discharge medications for this visit.           Mercedes Mariee  1/6/2019    EMERGENCY DEPARTMENT  I, Mercedes Mariee, am serving as a scribe at 10:11 AM on 1/6/2019 to document services personally performed by Elisa Garcia MD based on my observations and the provider's statements to me.        Elisa Garcia MD  01/06/19 2990

## 2019-01-07 NOTE — ANESTHESIA CARE TRANSFER NOTE
Patient: Tita Escobar    Procedure(s):  COLECTOMY RIGHT    Diagnosis: unknown  Diagnosis Additional Information: No value filed.    Anesthesia Type:   General, RSI, ETT     Note:  Airway :ETT  Patient transferred to:ICU  Comments: Patient connected to SpO@, ECG, blood pressure transport monitors.  Accompanied by CRNA and RN to ICU room.  Patient is ventialted by ambu with O2 @ 10 LPM during transport.  Pt connected to ICU ventilator on arrival with RT at bedside. ETT position unchanged and breath sounds confirmed per RT.      RR 9  O2 60 % PEEP 5  Report given to ICU RN and questions answered.  Pt on ICU monitors and stable at handoff.      Vitals: (Last set prior to Anesthesia Care Transfer)    CRNA VITALS  1/6/2019 1838 - 1/6/2019 1925      1/6/2019             NIBP:  118/70    Pulse:  113    NIBP Mean:  81    Ht Rate:  113                Electronically Signed By: MAKSIM Mayo CRNA  January 6, 2019  7:25 PM

## 2019-01-07 NOTE — PROGRESS NOTES
Jose Alejandro's test performed prior to radial ABG draw. Collateral circulation confirmed. ABG drawn from left radial artery. Pt on 60% ventilator.  Will continue to follow.    Mk Hughes RT

## 2019-01-07 NOTE — PLAN OF CARE
Patient on cardizem gtt at 5 this shift. Pleasant, A&Ox3, forgetful of situation. Denies pain this shift refusing meds-did get dilaudid post PT for pain 5/10. Drainage outlined on island dressing-surgeon wants to reinforce but NOT change till tomorrow. NG to LIS-300 out this shift of bile/brown. 2L NC. Transfer orders, but no beds available. Updated daughter at bedside. Monitor closely.

## 2019-01-07 NOTE — BRIEF OP NOTE
[facility]    Brief Operative Note    Pre-operative diagnosis: Ischemic colitis  Post-operative diagnosis Same, necrotic cecum  Procedure: Procedure(s):  COLECTOMY RIGHT  Surgeon: Surgeon(s) and Role:     * Horacio Armenta MD - Primary     * Kaylee Lagos MD - Assisting  Anesthesia: General   Estimated blood loss: Less than 100 ml  Drains: None  Specimens:   ID Type Source Tests Collected by Time Destination   1 : PERITONEAL FLUID Fluid Peritoneum ANAEROBIC BACTERIAL CULTURE, FLUID CULTURE AEROBIC BACTERIAL, GRAM STAIN Horacio Armenta MD 1/6/2019  5:53 PM    2 : PERITONEUM Tissue Peritoneum ANAEROBIC BACTERIAL CULTURE, GRAM STAIN, TISSUE CULTURE AEROBIC BACTERIAL Horacio Armenta MD 1/6/2019  5:54 PM    A : RIGHT COLON AND TERMINAL ILEUM Tissue Large Intestine, Right/Ascending SURGICAL PATHOLOGY EXAM Horacio Armenta MD 1/6/2019  6:02 PM      Findings:   None.  Complications: ischemic TI and right colon, necrotic cecum, purulent bloody fluid in abdomen.  Given her age, comorbidities, would recommend monitoring in ICU overnight  Implants: None.      IVF: 2000  UOP: 100  Condition on discharge from OR: Satisfactory    Horacio Armenta MD   [facility]   [phone].        ADDENDUM:    PATIENT DATA  Indicate Y or N:  Home O2 No  Hemodialysis  No  Transplant patient  No  Cirrhosis  No  Steroids in last 30 days  No  Immunomodulators in last 30 days  No  Anticoagulation at time of surgery  Yes   List medication pradaxa  Prior abdominal surgery  Yes  Pelvic irradiation  No    Albumin within 30 days if known 3   Hgb within 30 days if known 14   Hemoglobin   Date Value Ref Range Status   01/06/2019 14.1 11.7 - 15.7 g/dL Final   ]  Cr within 30 days if known 0.99   Creatinine   Date Value Ref Range Status   01/06/2019 0.99 0.52 - 1.04 mg/dL Final   ]  Body mass index is 31.46 kg/m .      OR DATA  Emergent  Yes   <24 hours  Yes   <1 week  No  Bowel Prep  No  Antibiotics  Yes  DVT prophylaxis    Heparin  No   SCD  Yes   None  No  Drain  No  ASA (1,2,3,4) 4  OR time (min) 120  Stents  No  Transfuse >/= 2U  No  Anastomosis   Stapled  Yes   Handsewn  No  Leak Test    Positive  No   Negative  No   Not done  Yes

## 2019-01-07 NOTE — PROGRESS NOTES
[facility]    Hospitalist Progress Note    Assessment & Plan   Ms. Tita Escobar is an 86-year-old female with a history of atrial fibrillation on Pradaxa, hypertension, previous CVA, and mild cognitive impairment who presents to the emergency room with abdominal pain.     Ischemic colitis with necrotic cecum status post exploratory laparotomy and right colectomy.    -The patient presented with abdominal pain for several days  -clinically was dehydrated with a significant leukocytosis at 32,000, elevated lactate and CT abdomen concerning for ischemic colitis with prominent right colon bowel wall thickening with mesenteric edema or inflammation and adjacent free fluid with suspicion for pneumatosis.    -Patient was evaluated by Colorectal Surgery and emergently underwent exploratory laparotomy and right colectomy on 1/7.  Estimated blood loss 50 mL.  Intraoperatively she was also found to have bloody purulent fluid without any anel perforation.  -Await culture report from intra-abdominal fluid  -Defer routine postop cares per colorectal surgery  -N.p.o. for now  -Continue IV Zosyn  -Await return of bowel function  -LR at 75 mL/h  -Pain control  -Transfer out of ICU today    Atrial fibrillation with rapid ventricular response.    -The patient does have chronic AFib and sees Dr. Augustine of [facility].    -Prior to admission, she is on Pradaxa 150 mg twice daily for anticoagulation and also on Cardizem 360 mg daily.    -Continues to be in rapid A. fib  -Continue Cardizem drip  -We will transition to oral diltiazem once she is able to tolerate p.o.  -Pradaxa was reversed with Praxbind preoperatively  -Hold Pradaxa for now, will resume when okay with colorectal surgery.  -Telemetry monitoring     Benign essential hypertension.    -Hold her prior to admission ramipril and Cardizem.   -Currently on Cardizem drip, blood pressure adequately controlled.    Mild cognitive impairment with possible mild acute delirium.     -The patient does have history of mild cognitive impairment, and per the daughter, she does get delirious when she is in unfamiliar surroundings or when she is hospitalized.    -Patient appeared to have some delirium yesterday although appears to be fairly lucid   this morning  -Delirium protocol.    Chronic kidney disease, stage III.   Hyponatremia   -Her creatinine has fluctuated between 0.9 and 1.1.   -She was on the dry side yesterday, creatinine has improved with IV hydration 2.88  -Hyponatremia most likely was hypovolemic, monitor.     # Pain Assessment:  Current Pain Score 1/7/2019   Patient currently in pain? denies   Pain score (0-10) -   - Tita is experiencing pain due to exploratory laparotomy. Pain management was discussed and the plan was created in a collaborative fashion.  Tita's response to the current recommendations: engaged  - Please see the plan for pain management as documented above      D/W: RN  DVT Prophylaxis: Heparin SQ  Code Status: Full Code    Disposition: Expected discharge in 2+days    Kaveh Salas MD    Interval History   Patient underwent emergent exploratory laparotomy yesterday evening.  She was successfully extubated yesterday evening and was overnight in the ICU.  Claims that pain is well controlled.  Denies any nausea or vomiting.  Continues to be in rapid A. fib on Cardizem drip.  Denies chest pain or dyspnea.    -Data reviewed today: I reviewed all new labs and imaging results over the last 24 hours. I personally reviewed the EKG tracing showing Rapid A. fib.      Physical Exam   Temp: 98.3  F (36.8  C) Temp src: Oral BP: 122/80 Pulse: 109 Heart Rate: 87 Resp: 20 SpO2: 95 % O2 Device: Nasal cannula Oxygen Delivery: 2 LPM  Vitals:    01/06/19 0949 01/06/19 2000   Weight: 78 kg (172 lb) 81.1 kg (178 lb 12.7 oz)     Vital Signs with Ranges  Temp:  [98.1  F (36.7  C)-98.4  F (36.9  C)] 98.3  F (36.8  C)  Pulse:  [] 109  Heart Rate:  [] 87  Resp:  [9-25]  20  BP: (107-150)/(53-97) 122/80  FiO2 (%):  [60 %] 60 %  SpO2:  [89 %-100 %] 95 %  I/O last 3 completed shifts:  In: 5819.97 [I.V.:3719.97; IV Piggyback:2100]  Out: 1985 [Urine:1585; Emesis/NG output:300; Blood:100]    Constitutional: AAOX2, NAD  HEENT: NG tube in place, no pallor or icterus  Neck-  No JVD  Respiratory: Coarse breath sounds, diminished at the bases bilaterally, normal work of breathing cardiovascular: Irregular, tachycardic  GI: Surgical incision bandaged with some oozing, bowel signed absent, appropriately tender around the surgical site.    Skin/Integument: Warm and dry, no rashes  MSK: No joint deformity or swelling, 1+ edema  Neuro: CN- grossly intact       Medications       diltiazem (CARDIZEM) infusion ADULT 5 mg/hr (01/07/19 0800)     lactated ringers 100 mL/hr at 01/07/19 0800     propofol (DIPRIVAN) infusion Stopped (01/06/19 2055)     sodium chloride 10 mL/hr at 01/07/19 0800         heparin  5,000 Units Subcutaneous Q8H     piperacillin-tazobactam  2.25 g Intravenous Q6H     sodium chloride (PF)  3 mL Intracatheter Q8H       Data     Recent Labs   Lab 01/07/19  0505 01/06/19 2014 01/06/19  1628 01/06/19  1000   WBC 23.0* 25.5*  --  32.4*   HGB 12.7 12.0  --  14.1   MCV 91 90  --  88    214  --  272   INR  --   --  1.54* 2.72*   * 131* 130* 127*   POTASSIUM 4.4 4.3 4.3 4.2   CHLORIDE 99 99 97 91*   CO2 24 26 25 26   BUN 19 19 21 24   CR 0.88 0.90 0.99 1.16*   ANIONGAP 6 6 8 10   MARK 8.0* 7.6* 7.8* 9.1   * 110* 108* 118*   ALBUMIN  --   --   --  3.2*   PROTTOTAL  --   --   --  7.3   BILITOTAL  --   --   --  0.8   ALKPHOS  --   --   --  68   ALT  --   --   --  21   AST  --   --   --  17   LIPASE  --   --   --  63*       Recent Results (from the past 24 hour(s))   CT Abdomen Pelvis w Contrast    Narrative    CT ABDOMEN PELVIS WITH CONTRAST 1/6/2019 11:47 AM    TECHNIQUE: Images from diaphragm to pubic symphysis 86 mL Isovue-370  Radiation dose for this scan was reduced  using automated exposure  control, adjustment of the mA and/or kV according to patient size, or  iterative reconstruction technique.    HISTORY: Abdominal pain, unspecified; abdomen infection (incl  peritonitis); tachy, diaphoretic, severe abd pain, vomiting    COMPARISON: 10/2/2008 CT abdomen and pelvis    FINDINGS:   Abdomen and Pelvis: The hepatic flexure of the colon and ascending  colon are mildly tortuous and demonstrate prominent bowel wall  thickening. Curvilinear lucencies suspicious for pneumatosis although  possibly in the peripheral lumen of the bowel. There is prominent  fluid and mesenteric stranding in the right abdomen and small amount  of free fluid in the pelvis. Differential diagnosis includes  nonspecific colitis such as infectious or inflammatory process but  worrisome for ischemic colon which is also in the differential  diagnosis. No evidence for portal venous gas in the liver. Results  discussed with Dr. Garcia at 12:00 noon, 1/6/2019. Small perihepatic  ascites.    Cholecystectomy clips. No focal liver lesions. Normal-appearing  spleen, pancreas, and left kidney. Mild adrenal thickening. Right  renal cysts, right kidney otherwise appears normal.    Subpleural dependent atelectasis posterior lung bases bilaterally.  Moderate atherosclerotic vascular calcification without abdominal  aortic aneurysm or periaortic or pelvic adenopathy. Multilevel  degenerative changes lumbar spine.        Impression    IMPRESSION: Prominent right colon bowel wall thickening with  mesenteric edema or inflammation and adjacent free fluid with  suspicion for pneumatosis. Concerning for ischemic bowel but prominent  colitis of infectious or inflammatory etiology also the differential  diagnosis. Fluid identified in mildly distended transverse colon.                 FREDI TAYLOR MD

## 2019-01-07 NOTE — PLAN OF CARE
Discharge Planner PT   Patient plan for discharge: return to nursing home  Current status: 87 y/o fe POD 1 s/p exploratory lap and R colectomy.  Baseline lives in [place], mod I with transfers and gait using walker, walks 30 min/day per patient report.  Assist with meds, meals, bathing.  Currently mod A x 2 for sup<->sit, CGA-mod A for EOB sitting balance.  Pain and weakness limiting OOB tolerance and trial of standing today.  Barriers to return to prior living situation: Level of assist with mobility  Recommendations for discharge: TCU  Rationale for recommendations: Skilled PT indicated to address deficits in strength, endurance and progress mobility within abdominal precautions for return to independence.       Entered by: Amanda Mclaughlin 01/07/2019 2:32 PM

## 2019-01-07 NOTE — CONSULTS
[facility]   CRITICAL CARE ADMISSION/CONSULTATION    Tita Escobar MRN: [id number]  1932  Date of Admission:2019          HPI   Tita Escobar IS a 86 year old female admitted on 2019 with significant history for asthma, atrial fibrillation, HTN, CVA, hyperlipidemia who presented to the ED this afternoon for abdominal pain and found to have ischemic colitis on CT. Colorectal surgery did discuss the high morbidity and mortality with the family and decision was made to proceed for ex-lap and right colectomy. Intraoperatively, she received 2L LR with EBL<100cc. She is transferred to ICU on ventilator for post-op and medical management.            Past Medical History:      Past Medical History:   Diagnosis Date     Asthma      Chronic kidney disease     acute kidney failure     CVA (cerebral vascular accident) (H)      Dehydration      Depressive disorder      E. coli UTI      Gastroesophageal reflux disease      Heart disease      Hyperkalemia      Hyperlipidaemia      Hypertension      Hyponatremia      Mild cognitive impairment      Persistent atrial fibrillation (H)      Rupture of uterus      UTI (urinary tract infection)              Past Surgical History:      Past Surgical History:   Procedure Laterality Date     HYSTEROSCOPY  2014    x 2      KNEE SURGERY              Social History:     Social History     Tobacco Use     Smoking status: Former Smoker     Last attempt to quit: 1982     Years since quittin.0     Smokeless tobacco: Never Used   Substance Use Topics     Alcohol use: Yes     Comment: occasional            Family History:     Family History   Problem Relation Age of Onset     Heart Disease Father      Hypertension Father      Hypertension Sister              Allergies:   Please see allergy list which was reviewed this admission.         Medications:       diltiazem  20 mg Intravenous Once     [START ON 2019] heparin  5,000 Units Subcutaneous Q8H      piperacillin-tazobactam  2.25 g Intravenous Q6H     sodium chloride (PF)  3 mL Intracatheter Q8H     sodium chloride (PF)  3 mL Intracatheter Q8H     acetaminophen, diphenhydrAMINE, HYDROmorphone, HYDROmorphone, lactated ringers, lidocaine 4%, lidocaine (buffered or not buffered), naloxone, naloxone, nitroGLYcerin, ondansetron **OR** ondansetron, ondansetron, potassium chloride, potassium chloride with lidocaine, potassium chloride, potassium chloride, potassium chloride, prochlorperazine, sodium chloride (PF), sodium chloride (PF)         Review of Systems:   Unable to assess due to critical illness         Physical Examination:   Temp:  [98.1  F (36.7  C)-98.4  F (36.9  C)] 98.1  F (36.7  C)  Pulse:  [] 98  Heart Rate:  [] 117  Resp:  [20-25] 20  BP: (125-150)/(76-97) 126/80  SpO2:  [89 %-99 %] 96 %    Intake/Output Summary (Last 24 hours) at 1/6/2019 1937  Last data filed at 1/6/2019 1908  Gross per 24 hour   Intake 4500 ml   Output 1175 ml   Net 3325 ml     Wt Readings from Last 4 Encounters:   01/06/19 78 kg (172 lb)   07/17/18 72.6 kg (160 lb)   04/19/17 79.7 kg (175 lb 9.6 oz)   04/19/16 81.2 kg (179 lb)     BP - Mean:  [89-98] 95  Resp: 20    No lab results found in last 7 days.    GEN: intubated, sedated  HEENT: head ncat, sclera anicteric, OP patent, trachea midline   PULM: unlabored synchronous with vent, clear anteriorly    CV/COR: RRR S1S2 no gallop,  No rub, no murmur  ABD: soft nontender, hypoactive bowel sounds, no mass. Incision c/d/i. No drains.   EXT:  Edema   warm  NEURO: grossly intact  SKIN: no obvious rash      Assessment and plan :     Neurology/Psychiatry/Pain/Sedation:   1. Sedation for vent synchrony. Cont propofol. Consider SAT tonight with goals to extubation.     Cardiovascular/Hemodynamics/Pulmonary/Ventilator Management/GI/Nutrition/Renal/Fluids/Electrolytes: :  1. Acute respiratory failure 2/2 ischemic colitis s/p right colectomy. Easy to oxygenate/ventilate on 60%  and PEEP 5. Plan to wean parameters and will consider SBT with goals to extubation tonight if remains stable. Abdomen is soft and distal extremities are warm. Incision is c/d/i. Ok with zosyn for infectious coverage. Further recs per surgery.    2. Atrial fibrillation. On CCB and pradaxa at home. Will hold pradaxa tonight and wait for surgery input for reinitiation. I will initiate IV cardizem infusion tonight for afib control then eventually transition to oral when ok with surgery.     Endocrine/Hematology/Oncology:   1. ICU glucose protocol.     Disposition/Code Status/Other  1. Stable post-op from right colectomy.   2. Code: Full     ICU Prophylaxis:   1. DVT: mechanical  2. VAP: HOB 30 degrees, chlorhexidine rinse  3. Stress Ulcer: PPI/H2 blocker  4. Restraints: Nonviolent soft two point restraints required and necessary for patient safety and continued cares and good effect as patient continues to pull at necessary lines, tubes despite education and distraction. Will readdress daily.   5. IV Access - central access required and necessary for continued patient cares  6. Feeding - npo    I have personally reviewed the daily labs, imaging studies, cultures and discussed the case with referring physician and consulting physicians.     This patient is critically ill and I have provided 30 minutes of critical care time (excluding procedures) on January 6, 2019.     Luis Monsivais MD   Critical Care Staff  [phone]         Data:   All data and imaging reviewed     ROUTINE ICU LABS (Last four results)  CMP  Recent Labs   Lab 01/06/19  1628 01/06/19  1000   * 127*   POTASSIUM 4.3 4.2   CHLORIDE 97 91*   CO2 25 26   ANIONGAP 8 10   * 118*   BUN 21 24   CR 0.99 1.16*   GFRESTIMATED 52* 43*   GFRESTBLACK 60* 49*   MARK 7.8* 9.1   PROTTOTAL  --  7.3   ALBUMIN  --  3.2*   BILITOTAL  --  0.8   ALKPHOS  --  68   AST  --  17   ALT  --  21     CBC  Recent Labs   Lab 01/06/19  1000   WBC 32.4*   RBC 4.46   HGB 14.1   HCT  39.3   MCV 88   MCH 31.6   MCHC 35.9   RDW 13.1        INR  Recent Labs   Lab 01/06/19  1628 01/06/19  1000   INR 1.54* 2.72*     Arterial Blood GasNo lab results found in last 7 days.    All cultures:  Recent Labs   Lab 01/06/19  1030 01/06/19  1000   CULT No growth after 1 hour No growth after 2 hours     Recent Results (from the past 24 hour(s))   CT Abdomen Pelvis w Contrast    Narrative    CT ABDOMEN PELVIS WITH CONTRAST 1/6/2019 11:47 AM    TECHNIQUE: Images from diaphragm to pubic symphysis 86 mL Isovue-370  Radiation dose for this scan was reduced using automated exposure  control, adjustment of the mA and/or kV according to patient size, or  iterative reconstruction technique.    HISTORY: Abdominal pain, unspecified; abdomen infection (incl  peritonitis); tachy, diaphoretic, severe abd pain, vomiting    COMPARISON: 10/2/2008 CT abdomen and pelvis    FINDINGS:   Abdomen and Pelvis: The hepatic flexure of the colon and ascending  colon are mildly tortuous and demonstrate prominent bowel wall  thickening. Curvilinear lucencies suspicious for pneumatosis although  possibly in the peripheral lumen of the bowel. There is prominent  fluid and mesenteric stranding in the right abdomen and small amount  of free fluid in the pelvis. Differential diagnosis includes  nonspecific colitis such as infectious or inflammatory process but  worrisome for ischemic colon which is also in the differential  diagnosis. No evidence for portal venous gas in the liver. Results  discussed with Dr. Garcia at 12:00 noon, 1/6/2019. Small perihepatic  ascites.    Cholecystectomy clips. No focal liver lesions. Normal-appearing  spleen, pancreas, and left kidney. Mild adrenal thickening. Right  renal cysts, right kidney otherwise appears normal.    Subpleural dependent atelectasis posterior lung bases bilaterally.  Moderate atherosclerotic vascular calcification without abdominal  aortic aneurysm or periaortic or pelvic adenopathy.  Multilevel  degenerative changes lumbar spine.        Impression    IMPRESSION: Prominent right colon bowel wall thickening with  mesenteric edema or inflammation and adjacent free fluid with  suspicion for pneumatosis. Concerning for ischemic bowel but prominent  colitis of infectious or inflammatory etiology also the differential  diagnosis. Fluid identified in mildly distended transverse colon.                 FREDI TAYLOR MD

## 2019-01-07 NOTE — PLAN OF CARE
Arrived to station 33 from ED at 1424. A&Ox4, intermittently confused and forgetful, baseline per daughter. O2 sats adequate on 2 L, NC. R quadrant pain with palpation. NG in place at 65 cm, suction to LIS. Patterson placed. Down to PACU at 1650, report given to Yamile KEYS prior to transport. Daughter and Son-in-law walked down with patient to PACU, all belongings taken by daughter. Continue to monitor.

## 2019-01-07 NOTE — OP NOTE
Procedure Date: 01/06/2019      PREOPERATIVE DIAGNOSIS:  Ischemic colitis.      POSTOPERATIVE DIAGNOSIS:  Ischemic colitis.      PROCEDURES PERFORMED:     1.  Exploratory laparotomy.   2.  Right colectomy.      STAFF SURGEON:  Horacio Armenta MD      FIRST ASSISTANT:  Kaylee Lagos MD      ESTIMATED BLOOD LOSS:  50 mL.      SPECIMENS:  Terminal ileum and right colon.      FINDINGS:  The patient had hemorrhagic colitis involving the right colon and the terminal ileum in the distribution of the ileocolic vessel.  There is an area of necrosis but without obvious perforation of the cecum.  There was purulent bloody fluid found throughout the abdomen.  We performed a right colectomy with resection of approximately 20 cm of the terminal ileum and performed a side-to-side functional end-to-end ileocolic anastomosis.      INDICATIONS:  Ms. Escobar is an elderly woman who complains of several days of abdominal pain.  She has significant tachycardia, hyponatremia and leukocytosis on evaluation in the emergency department.  A CT scan was obtained which demonstrated inflammation of the ascending colon, as well as some pneumatosis.  Examination demonstrated peritoneal signs.  I recommended exploratory laparotomy, right colectomy, possible stoma.  The risks of surgery including the risk of bleeding, infection, injury to adjacent structures, need for further surgery and expectations regarding recovery were discussed.  She wishes to proceed.      DESCRIPTION OF PROCEDURE:  The patient was brought down urgently to the operating room.  She was laid supine on the operating room table.  General anesthesia was induced.  She was intubated by the anesthesia service without difficulty.  She was kept in the supine position.  A Patterson catheter had been placed preoperatively.  A nasogastric tube had also been placed preoperatively.  She had received several liters of resuscitation and her anticoagulation had been reversed preoperatively.  The  abdomen was prepped and draped in the usual sterile fashion.  A timeout was undertaken, which identified the patient, the correct procedure.      We began by making a generous upper midline incision.  Dissection was taken with electrocautery, entered into the abdomen without difficulty.  There were some adhesions from the patient's prior appendectomy to the right lower quadrant anterior abdominal wall.  These were taken down with electrocautery without injury to underlying bowel.  As we explored the abdomen, we encountered a large amount of bloody purulent fluid.  This was sampled for culture.  We then aspirated the remainder of this fluid.  We identified the small bowel and the terminal ileum and the cecum and identified a hemorrhagic necrosis of the cecum without anel perforation.  We then mobilized the colon in a lateral to medial fashion, incising along the white line of Toldt.  The duodenum and kidney were swept posteriorly to elevate the colon and the mesocolon up off the retroperitoneal structures.  We chose a point for distal transection in the terminal ileum based off the main branch of the middle colic vessel where the colon appeared to be well perfused.  The bowel was isolated here, and the omentum was mobilized off the transverse colon.  The attachments of the hepatic flexure were taken to fully mobilize the right colon.  We chose a point for proximal transection where the bowel was well perfused.  The last 20 cm of the terminal ileum did demonstrate some hemorrhage and therefore, we elected to resect this area.  Once the point for proximal and distal areas for transection was chosen, the intervening mesentery was taken with the LigaSure device fairly close to the bowel wall.  Once the mesentery was cleared, antimesenteric enterotomies were made and a 100 mm blue load CARMEN stapler was secured within the lumen of the bowel to create a side-to-side functional end-to-end anastomosis.  The stapler was closed  and fired.  The common enterotomy was closed with a TA-90 stapler and the specimen was liberated from the staple line.  The lumen of the anastomosis was inspected prior to closure, and there was some melena within the lumen of the bowel on the colonic side; however, the mucosa was nice and pink once this was cleared. The anastomosis was created without tension and with excellent blood supply and was widely patent.  We then ran the rest of the small bowel and it was found to be without any areas of concern and all appeared healthy and normal.  The remaining transverse, descending and sigmoid colon were inspected and found to be healthy.  The abdomen was then copiously irrigated with several liters of warm normal saline.  The abdomen was then closed after verifying that the NG was in good position.  I should note the stomach was also inspected and found to be normal.  The liver was noted to be somewhat nodular but no evidence of masses or necrosis and the gallbladder also was normal.  The abdomen was closed with 2 running sutures of looped 0 PDS.  Skin and subcutaneous fat was irrigated and closed with staples.  She was awakened but not extubated due to her slow metabolism of the induction agents and the paralytics.  She was transferred to the PACU intubated and stable.  The plan is to monitor her overnight in the intensive care unit.      Lap, sponge and needle counts correct at the end of the case x 2.         HORACIO ARMENTA MD             D: 2019   T: 2019   MT: RYAN      Name:     SHAHRAM ZAMORA   MRN:      [id number]        Account:        [id number]   :      1932           Procedure Date: 2019      Document: [id number]       cc: Horacio Armenta MD

## 2019-01-07 NOTE — PLAN OF CARE
Neuro: Patient is alert, follows commands, intermittently confused upon waking.   Cardiac: On Cardizem drip to keep HR less than 100. AFib with BBB.  Pulmonary: Extubated on evening shift to Oxymask. Able to clear own secretions.   GI:  NPO. NG to LIWS with bile output.  : Patterson in place for accurate output measurement. Clear, yellow urine.  Integumentary: Abdominal dressing reinforced for sanguineous drainage.    Morning labs have been reviewed. Plan of care has been explained to patient. Daughter updated via phone. Continue to monitor and assess.    Jitendra Mo RN, BSN, CCRN

## 2019-01-07 NOTE — PROGRESS NOTES
COLON & RECTAL SURGERY  PROGRESS NOTE    January 7, 2019  Post-op Day # 1    SUBJECTIVE:  The patient is doing well. She feels so much better then she did before. She denies nausea or vomiting.     OBJECTIVE:  Temp:  [98.1  F (36.7  C)-98.4  F (36.9  C)] 98.3  F (36.8  C)  Pulse:  [] 109  Heart Rate:  [] 111  Resp:  [9-25] 20  BP: (107-150)/(53-97) 130/67  FiO2 (%):  [60 %] 60 %  SpO2:  [89 %-100 %] 94 %    Intake/Output Summary (Last 24 hours) at 1/7/2019 0858  Last data filed at 1/7/2019 0800  Gross per 24 hour   Intake 6059.97 ml   Output 2085 ml   Net 3974.97 ml       GENERAL:  Awake, alert, no acute distress,   HEAD - Nomocephalic atraumatic  SCLERA anicteric  EXTREMITIES warm and well perfused  ABDOMEN:  Soft, appropriately tender, moderately-distended, no rebound or guarding.   INCISION:  C/d/i,     LABS:  Lab Results   Component Value Date    WBC 23.0 01/07/2019     Lab Results   Component Value Date    HGB 12.7 01/07/2019     Lab Results   Component Value Date    HCT 37.3 01/07/2019     Lab Results   Component Value Date     01/07/2019     Last Basic Metabolic Panel:  Lab Results   Component Value Date     01/07/2019      Lab Results   Component Value Date    POTASSIUM 4.4 01/07/2019     Lab Results   Component Value Date    CHLORIDE 99 01/07/2019     Lab Results   Component Value Date    MARK 8.0 01/07/2019     Lab Results   Component Value Date    CO2 24 01/07/2019     Lab Results   Component Value Date    BUN 19 01/07/2019     Lab Results   Component Value Date    CR 0.88 01/07/2019     Lab Results   Component Value Date     01/07/2019       ASSESSMENT/PLAN:POD#1 ex lap, right colectomy for hemorrhagic colitis  1. Okay to transfer to the floor from surgery standpoint  2. Continue NPO and NG tube  3. Decrease IVF to 75 ml/hr   4. Add dilaudid PRN for pain medication   5. Continue abx  6. Heparin for prophylaxis  7. OOB, ambulate    Elisa Abreu PA-C  [facility]  [phone]

## 2019-01-07 NOTE — ANESTHESIA POSTPROCEDURE EVALUATION
Patient: Tita Escobar    Procedure(s):  COLECTOMY RIGHT    Diagnosis:unknown  Diagnosis Additional Information: No value filed.    Anesthesia Type:  General, RSI, ETT    Note:  Anesthesia Post Evaluation    Patient location during evaluation: ICU  Patient participation: Unable to evaluate secondary to administered sedation  Cardiovascular status: acceptable  Respiratory status: acceptable  Hydration status: acceptable             Last vitals:  Vitals:    01/06/19 2230 01/06/19 2245 01/06/19 2300   BP: 133/75 140/84 114/68   Pulse:      Resp: 22 24 22   Temp:      SpO2: 97% 96% 95%         Electronically Signed By: Radha Smith  January 6, 2019  11:10 PM

## 2019-01-07 NOTE — PROGRESS NOTES
Patient arrived to  from surgery in stable condition on ventilator. Family updated on patient's condition and plan of care. Dr. Armenta paged to give sign-out to ICU MD.

## 2019-01-07 NOTE — PROGRESS NOTES
01/07/19 1337   Quick Adds   Type of Visit Initial PT Evaluation   Living Environment   Lives With facility resident   Living Arrangements assisted living   Home Accessibility no concerns   Self-Care   Usual Activity Tolerance fair   Current Activity Tolerance poor   Equipment Currently Used at Home walker, rolling   Activity/Exercise/Self-Care Comment Pt reports she walks 30 min daily on her own with walker.  [facility] brings 3 meals/day to room, manages her medications, assists with bathing.  I with dressing.   Functional Level Prior   Ambulation 1-->assistive equipment   Transferring 1-->assistive equipment   Toileting 1-->assistive equipment   Bathing 3-->assistive equipment and person   Fall history within last six months no   General Information   Onset of Illness/Injury or Date of Surgery - Date 01/06/19   Referring Physician Luis Monsivais   Patient/Family Goals Statement Eventual return to [facility] and PLOF   Pertinent History of Current Problem (include personal factors and/or comorbidities that impact the POC) Pt is an 87 y/o fe presenting with abdominal pain, dx with ischemic colitis, now POD 1 s/p exploratory lap and R colectomy for hemmoraghic colitis.  PMH: a fib, CVI, mild cog impairment   Precautions/Limitations fall precautions;abdominal precautions;oxygen therapy device and L/min   General Observations NG tube, pleasant and agreeable to PT   Cognitive Status Examination   Orientation orientation to person, place and time   Level of Consciousness alert   Follows Commands and Answers Questions 100% of the time   Cognitive Comment Per chart, mild cognitive impairment, defer to OT for assessment   Pain Assessment   Patient Currently in Pain Yes, see Vital Sign flowsheet  (4/10 with activity)   Integumentary/Edema   Integumentary/Edema Comments surgical wound not viewed by PT   Range of Motion (ROM)   ROM Quick Adds No deficits were identified   Strength   Strength Comments Pt demonstrates at least antigravity UE and  "LE, MMT not completed secondary to abdominal precautions.  Functional strength deficits apparent with bed mobility, EOB sitting balance.   Bed Mobility Skill: Sit to Supine   Level of [place]: Sit/Supine moderate assist (50% patients effort)   Physical Assist/Nonphysical Assist: Sit/Supine verbal cues;nonverbal cues (demo/gestures);2 persons   Bed Mobility Skill: Supine to Sit   Level of [place]: Supine/Sit moderate assist (50% patients effort)   Physical Assist/Nonphysical Assist: Supine/Sit verbal cues;nonverbal cues (demo/gestures);2 persons   Transfer Skills   Transfer Comments Transfer to chair not completed secondary to pain and fatigue with sitting EOB   Balance   Balance other (describe)   Balance Comments CGA-mod A for static sitting balance, varying with pain and fatigue   General Therapy Interventions   Planned Therapy Interventions bed mobility training;gait training;strengthening;transfer training;progressive activity/exercise   Clinical Impression   Criteria for Skilled Therapeutic Intervention yes, treatment indicated   PT Diagnosis difficulty in gait   Influenced by the following impairments pain, abdominal precautions, decreased activity tolerance, weakness   Functional limitations due to impairments decreased I with bed mobility, transfers, gait for home mobility   Clinical Presentation Stable/Uncomplicated   Clinical Presentation Rationale patient stable post operatively, pain and weakness primary limiting factors   Clinical Decision Making (Complexity) Low complexity   Therapy Frequency` daily   Predicted Duration of Therapy Intervention (days/wks) 5 days   Anticipated Discharge Disposition Transitional Care Facility   Risk & Benefits of therapy have been explained Yes   Patient, Family & other staff in agreement with plan of care Yes   [facility] AM-PAC  \"6 Clicks\" V.2 Basic Mobility Inpatient Short Form   1. Turning from your back to your side while in a flat bed without using " bedrails? 2 - A Lot   2. Moving from lying on your back to sitting on the side of a flat bed without using bedrails? 2 - A Lot   3. Moving to and from a bed to a chair (including a wheelchair)? 1 - Total   4. Standing up from a chair using your arms (e.g., wheelchair, or bedside chair)? 2 - A Lot   5. To walk in hospital room? 1 - Total   6. Climbing 3-5 steps with a railing? 1 - Total   Basic Mobility Raw Score (Score out of 24.Lower scores equate to lower levels of function) 9   Total Evaluation Time   Total Evaluation Time (Minutes) 25

## 2019-01-08 NOTE — PROGRESS NOTES
COLON & RECTAL SURGERY  PROGRESS NOTE    January 8, 2019  Post-op Day # 2 ex lap right colectomy    SUBJECTIVE:  Patient states she is feeling well.  States she has some pain, but notes it is tolerable.  Was able to stand bedside this morning.  NGT remains in place.    OBJECTIVE:  Temp:  [97.6  F (36.4  C)-98.2  F (36.8  C)] 97.6  F (36.4  C)  Pulse:  [] 108  Heart Rate:  [] 120  Resp:  [11-22] 16  BP: (109-146)/(53-86) 130/67  SpO2:  [86 %-98 %] 93 %    Intake/Output Summary (Last 24 hours) at 1/8/2019 0855  Last data filed at 1/8/2019 0634  Gross per 24 hour   Intake 4501.04 ml   Output 1530 ml   Net 2971.04 ml       GENERAL:  Awake, alert, no acute distress, resting in bed, nursing staff at bedside  HEAD: Normocephalic atraumatic  SCLERA: Anicteric  EXTREMITIES: Warm and well perfused  ABDOMEN:  Soft, appropriately tender, non-distended. No guarding, rigidity, or peritoneal signs.   INCISION:  C/d/i, staples at midline wound without erythema, drainage, or tenderness. Small circular area of dark discoloration at the umbilicus level of her midline wound that is non-tender per patient    LABS:  Lab Results   Component Value Date    WBC 14.6 01/08/2019     Lab Results   Component Value Date    HGB 9.8 01/08/2019     Lab Results   Component Value Date    HCT 29.6 01/08/2019     Lab Results   Component Value Date     01/08/2019     Last Basic Metabolic Panel:  Lab Results   Component Value Date     01/08/2019      Lab Results   Component Value Date    POTASSIUM 4.5 01/08/2019     Lab Results   Component Value Date    CHLORIDE 102 01/08/2019     Lab Results   Component Value Date    MARK 8.0 01/08/2019     Lab Results   Component Value Date    CO2 28 01/08/2019     Lab Results   Component Value Date    BUN 27 01/08/2019     Lab Results   Component Value Date    CR 0.74 01/08/2019     Lab Results   Component Value Date    GLC 87 01/08/2019       ASSESSMENT/PLAN: POD#2 ex lap, right colectomy for  hemorrhagic colitis.  WBC 23 -> 14. Afebrile. Remains in a-fib on tele.    1. Clamping trial of NGT.  If able to remove NGT (less than 200 ml output after 4 hours of clamping), would be ok for sips of clears and oral meds this afternoon.  2. IVF at 75 ml/hr   3. Tylenol and dilaudid for pain  4. Continue abx  5. Heparin for prophylaxis  6. OOB, ambulate  7. Discussed wound findings with Dr. Armenta who will see patient later today      For questions/paging, please contact the CRS office at [phone].    Patsy Leone PA-C  [facility]  Phone: [phone]    Addendum 3:15 PM: Discussed with Dr. Armenta and it would be OK to start heparin gtt tomorrow if Hgb remains stable on recheck in the morning.    Addendum 3:45 PM: Spoke with patient's nurse.  Clamping trial successful with <200 ml out after 4 hours clamped.  OK to remove NGT and start sips of clears and PO meds.    Patsy Leone PA-C  [facility]  [phone]    CRS Staff  Doing well.  NG is out.  Wound looks good. Has small hematoma of skin near umbilicus.  OK to start oral meds with sips of clears.  Hold pradaxa.  If hgb stable tomorrow, OK to start heparin drip tomorrow.    Horacio Armenta MD  Colorectal Surgery  [phone] (office)  [phone] (pager)  [email or web address]

## 2019-01-08 NOTE — PLAN OF CARE
Discharge Planner OT   Patient plan for discharge: pt hopes to return to her [facility] w/continued services as needed  Current status: OT eval completed and treatment initiated on 87yo female POD#2 exp lap w/R colectomy. Pt reports she resides at [facility], indep w/amb using walker, indep with dressing, toileting, grooming, exercises 30min each morning by ambulating in halls around facility. She receives assist with meds, bathing, laundry, cleaning and all meals are provided to her room daily. Today pt required Mod A of 1 with bed, Min A with sit-stand and transfer to bedside commode using walker. Max A with UB/LB dressing, Min A grooming seated EOB. Oriented x 4 however required repetitive cueing throughout to follow directives especially with transfers.   Barriers to return to prior living situation: level of assist with self-cares, mobility  Recommendations for discharge: TCU  Rationale for recommendations: pt would benefit from continued skilled OT training following discharge to advance to PLOF with self-cares prior to return home.        Entered by: Bull Cruz 01/08/2019 9:30 AM

## 2019-01-08 NOTE — PROGRESS NOTES
01/08/19 0859   Quick Adds   Type of Visit Initial Occupational Therapy Evaluation   Living Environment   Lives With alone   Living Arrangements assisted living   Home Accessibility no concerns   Self-Care   Usual Activity Tolerance good   Current Activity Tolerance fair   Equipment Currently Used at Home grab bar, toilet;grab bar, tub/shower;raised toilet;tub bench;walker, rolling   Activity/Exercise/Self-Care Comment Pt reports she walks 30min daily in early AM around halls in faciity with her walker   Functional Level   Ambulation 1-->assistive equipment   Transferring 1-->assistive equipment   Toileting 1-->assistive equipment   Bathing 3-->assistive equipment and person   Dressing 0-->independent   Eating 0-->independent   Communication 0-->understands/communicates without difficulty   Swallowing 0-->swallows foods/liquids without difficulty   Cognition 1 - attention or memory deficits   Fall history within last six months no   Which of the above functional risks had a recent onset or change? none   Prior Functional Level Comment Pt reports she has all meals provided to room, and A with meds, bathing, cleaning and laundry.    General Information   Onset of Illness/Injury or Date of Surgery - Date 01/06/19   Referring Physician Dr. Salas   Patient/Family Goals Statement return [facility]   Additional Occupational Profile Info/Pertinent History of Current Problem 87yo POD#2 exp lap and R colectomy for hemmoraghic colitis. PMH includes CVI w/mild residual cog impairment, Afib   Precautions/Limitations fall precautions;abdominal precautions;oxygen therapy device and L/min   General Observations NG tube   Cognitive Status Examination   Orientation orientation to person, place and time   Level of Consciousness alert   Follows Commands (Cognition) follows one step commands;over 90% accuracy   Cognitive Comment Per chart, has baseline mild memory impairments, will further address as needed.   Visual  Perception   Visual Perception No deficits were identified   Pain Assessment   Patient Currently in Pain Yes, see Vital Sign flowsheet   Range of Motion (ROM)   ROM Quick Adds No deficits were identified   Strength   Strength Comments BUE 4/5, symmetrical   Mobility   Bed Mobility Comments Mod A of 1   Transfer Skill: Sit to Stand   Level of [place]: Sit/Stand minimum assist (75% patients effort)   Physical Assist/Nonphysical Assist: Sit/Stand verbal cues;1 person assist   Assistive Device for Transfer: Sit/Stand rolling walker   Transfer Skill: Toilet Transfer   Level of [place]: Toilet minimum assist (75% patients effort)   Physical Assist/Nonphysical Assist: Toilet verbal cues;1 person assist   Assistive Device rolling walker;other (see comments)  (bedside commode)   Toilet Transfer Skill Comments Mod-Max cues - required repetition of directives to step correct directions (? is residual hearing, motor planning, R/L discrimination and/or cognitive deficits from old CVI?) Will further address as needed.   Balance   Balance Comments Cues for posture then able to maintain static standing with CGA, indep static sitting    Lower Body Dressing   Level of [place]: Dress Lower Body dependent (less than 25% patients effort)   Physical Assist/Nonphysical Assist: Dress Lower Body 1 person assist  (to don slipper socks EOB)   Toileting   Assistive Device (Catheterized)   Grooming   Level of [place]: Grooming minimum assist (75% patients effort)   Physical Assist/Nonphysical Assist: Grooming 1 person assist;set-up required  (seated EOB)   General Therapy Interventions   Planned Therapy Interventions ADL retraining;cognition;transfer training   Clinical Impression   Criteria for Skilled Therapeutic Interventions Met yes, treatment indicated   OT Diagnosis decreased ADL/IADL performance   Influenced by the following impairments post-op abd pain and abd precautions, general weakness/fatigue, baseline residual  "stroke deficits (cognition)   Assessment of Occupational Performance 3-5 Performance Deficits   Identified Performance Deficits dressing, toileting, grooming, functional mob   Clinical Decision Making (Complexity) Moderate complexity   Therapy Frequency daily   Predicted Duration of Therapy Intervention (days/wks) 5 days   Anticipated Discharge Disposition Transitional Care Facility   Risks and Benefits of Treatment have been explained. Yes   Patient, Family & other staff in agreement with plan of care Yes   [employer] TM \"6 Clicks\"   2016, Trustees of [facility], under license to [employer].  All rights reserved.   6 Clicks Short Forms Daily Activity Inpatient Short Form   [facility]  \"6 Clicks\" Daily Activity Inpatient Short Form   1. Putting on and taking off regular lower body clothing? 2 - A Lot   2. Bathing (including washing, rinsing, drying)? 2 - A Lot   3. Toileting, which includes using toilet, bedpan or urinal? 2 - A Lot   4. Putting on and taking off regular upper body clothing? 2 - A Lot   5. Taking care of personal grooming such as brushing teeth? 3 - A Little   6. Eating meals? 4 - None   Daily Activity Raw Score (Score out of 24.Lower scores equate to lower levels of function) 15   Total Evaluation Time   Total Evaluation Time (Minutes) 15     "

## 2019-01-08 NOTE — PLAN OF CARE
[id number]  Alert, oriented, appropiate.  Lung: O2 sats decreased with talking.  O2 sats decrease with talking but fingers also cool.  I: Increased O2 to 6LPM NC  CV: HR > 100.  I: Cardiazem gtt increased to 7.5mg/hr.  E: At rest, sleeping, HR decreased to 90's but when awake, with activity, up to 140.  GI: Bowel sounds heard. NG drainage bile.  U/O good to borderline but cath positional.  Report called to [facility].  Transferred with nurse and monitor to [facility].  Dtr, Michael, updated at 1945

## 2019-01-08 NOTE — PROGRESS NOTES
[facility]    Hospitalist Progress Note    Assessment & Plan   Ms. Tita Escobar is an 86-year-old female with a history of atrial fibrillation on Pradaxa, hypertension, previous CVA, and mild cognitive impairment who presents to the emergency room with abdominal pain.     Ischemic colitis with necrotic cecum status post exploratory laparotomy and right colectomy.    -The patient presented with abdominal pain for several days  -clinically was dehydrated with a significant leukocytosis at 32,000, elevated lactate and CT abdomen concerning for ischemic colitis with prominent right colon bowel wall thickening with mesenteric edema or inflammation and adjacent free fluid with suspicion for pneumatosis.    -Patient was evaluated by Colorectal Surgery and emergently underwent exploratory laparotomy and right colectomy on 1/7.  Estimated blood loss 50 mL.  Intraoperatively she was also found to have bloody purulent fluid without any anel perforation.  -Defer routine postop cares per colorectal surgery  -N.p.o. for now; defer diet advancement to colorectal surgery.  -Continue IV Zosyn  -Await return of bowel function  -LR at 75 mL/h  -Pain control    Atrial fibrillation with rapid ventricular response.    -The patient does have chronic AFib and sees Dr. Augustine of [facility].    -Prior to admission, she is on Pradaxa 150 mg twice daily for anticoagulation and  Cardizem 360 mg daily.    -Continue Cardizem drip  -Rate adequately controlled at the moment; between 80s-100s  -We will transition to oral diltiazem once she is able to take p.o.  -Pradaxa was reversed with Praxbind pre-operatively  -Hold Pradaxa for now, will resume when okay with colorectal surgery.  -Telemetry monitoring     Benign essential hypertension.    -Hold her prior to admission ramipril and Cardizem.   -Currently on Cardizem drip, blood pressure adequately controlled.    Mild cognitive impairment with possible mild acute delirium.    -The  patient does have history of mild cognitive impairment, and per the daughter, she does get delirious when she is in unfamiliar surroundings or when she is hospitalized.    -Appears to be fairly lucid at the moment with occasional forgetfulness.  -Delirium protocol.    Chronic kidney disease, stage III.   Hyponatremia   -Her creatinine has fluctuated between 0.9 and 1.1.   -She was on the dry side at presentation with creatinine of 1.16, creatinine has improved with IV hydration   -Hyponatremia most likely was hypovolemic, improved.     # Pain Assessment:  Current Pain Score 1/8/2019   Patient currently in pain? yes   Pain score (0-10) -   - Tita is experiencing pain due to exploratory laparotomy. Pain management was discussed and the plan was created in a collaborative fashion.  Tita's response to the current recommendations: engaged  - Please see the plan for pain management as documented above      D/W: RN  DVT Prophylaxis: Heparin SQ  Code Status: Full Code    Disposition: Expected discharge in 2+days    Kaveh Salas MD    Interval History   Patient claims that pain is adequately controlled.  Continues to be on diltiazem drip with heart rate between 80s-100s.  Denies nausea or vomiting.  Awaiting return of bowel function.  Denies dyspnea.  Oxygen stable at 2 L.    -Data reviewed today: I reviewed all new labs and imaging results over the last 24 hours. I personally reviewed the EKG tracing showing Rapid A. fib.      Physical Exam   Temp: 97.6  F (36.4  C) Temp src: Oral BP: 130/67 Pulse: 108 Heart Rate: 120 Resp: 16 SpO2: 93 % O2 Device: Nasal cannula Oxygen Delivery: 3 LPM  Vitals:    01/06/19 0949 01/06/19 2000 01/08/19 0604   Weight: 78 kg (172 lb) 81.1 kg (178 lb 12.7 oz) 83.5 kg (184 lb 1.4 oz)     Vital Signs with Ranges  Temp:  [97.6  F (36.4  C)-98.2  F (36.8  C)] 97.6  F (36.4  C)  Pulse:  [] 108  Heart Rate:  [] 120  Resp:  [11-22] 16  BP: (109-146)/(53-86) 130/67  SpO2:  [86 %-98  %] 93 %  I/O last 3 completed shifts:  In: 4741.04 [I.V.:4741.04]  Out: 1630 [Urine:1055; Emesis/NG output:575]    Constitutional: AAOX2, forgetful, NAD  HEENT: NG tube in place, no pallor or icterus  Neck-  No JVD  Respiratory: Diminished at the bases bilaterally, normal work of breathing   cardiovascular: Irregular, rate controlled for the most part.  GI: Surgical incision bandaged with some oozing, bowel signed absent, appropriately tender around the surgical site.    Skin/Integument: Warm and dry, no rashes  MSK: No joint deformity or swelling, 1+ edema  Neuro: CN- grossly intact       Medications       diltiazem (CARDIZEM) infusion ADULT 15 mg/hr (01/08/19 0856)     lactated ringers 75 mL/hr at 01/08/19 0858     propofol (DIPRIVAN) infusion Stopped (01/06/19 2055)     sodium chloride 10 mL/hr at 01/07/19 0800         heparin  5,000 Units Subcutaneous Q8H     piperacillin-tazobactam  2.25 g Intravenous Q6H       Data     Recent Labs   Lab 01/08/19  0549 01/07/19  0505 01/06/19  2014 01/06/19  1628 01/06/19  1000   WBC 14.6* 23.0* 25.5*  --  32.4*   HGB 9.8* 12.7 12.0  --  14.1   MCV 92 91 90  --  88    197 214  --  272   INR  --   --   --  1.54* 2.72*    129* 131* 130* 127*   POTASSIUM 4.5 4.4 4.3 4.3 4.2   CHLORIDE 102 99 99 97 91*   CO2 28 24 26 25 26   BUN 27 19 19 21 24   CR 0.74 0.88 0.90 0.99 1.16*   ANIONGAP 4 6 6 8 10   MARK 8.0* 8.0* 7.6* 7.8* 9.1   GLC 87 101* 110* 108* 118*   ALBUMIN  --   --   --   --  3.2*   PROTTOTAL  --   --   --   --  7.3   BILITOTAL  --   --   --   --  0.8   ALKPHOS  --   --   --   --  68   ALT  --   --   --   --  21   AST  --   --   --   --  17   LIPASE  --   --   --   --  63*       No results found for this or any previous visit (from the past 24 hour(s)).

## 2019-01-08 NOTE — PLAN OF CARE
A/O, forgetful. VSS on 2L NC. Tele: A.fib with BBB. HR 80's-110's. Diltiazem gtt @15mg/hr. LR infusing @75mL/hr. Abdominal pain controlled with PRN dilaudid, ice, and repositioning. Dried drainage on abdominal dressing marked. Patterson in place, patent. NG to low-intermittent suction, 75cc of bile output. IV ABX given. NPO. Turned/repositioned q2h.

## 2019-01-08 NOTE — PLAN OF CARE
Pt is alert/oriented x4 but forgetful. VSS with tele Afib CVR with BBB, on dilt gtt and LR running. Pt NPO with NG to 63cm, intermittent sxn. No change in abdominal dressing, drainage marked.

## 2019-01-08 NOTE — PLAN OF CARE
Discharge Planner PT   Patient plan for discharge: Not stated  Current status: Patient in supine upon arrival of therapist, agreeable to working with PT. Supine>sit with mod A, cues for log rolling as patient noting pain with movement. Patient able to sit at EOB x 10 minutes with B UE support and supervision; encouraged patient to complete IS while sitting at EOB, completed x 10 reps. Patient returned to supine with same level of assistance, positioned with pillows for comfort and pressure relief, bed alarm on, ad all needs in reach.  Barriers to return to prior living situation: Current level of assist, decreased tolerance to activity   Recommendations for discharge: TCU   Rationale for recommendations: Patient would benefit from continued skilled therapy at TCU to further improve strength, balance, and independence with mobility and ambulation to address functional limitations and decrease falls risk.         Entered by: Marlys Vickers 01/08/2019 3:37 PM

## 2019-01-09 NOTE — PROGRESS NOTES
[facility]ON [facility] RECT[facility]  PROGRESS NOTE    January 9, 2019  Post-op Day # 3 ex lap, right colectomy    SUBJECTIVE:  Patient states she slept very well over night and feels well this morning.  States pain is tolerable.  NGT removed yesterday and tolerating clears.  Denies nausea or vomiting.  Passing flatus.    OBJECTIVE:  Temp:  [98  F (36.7  C)] 98  F (36.7  C)  Pulse:  [] 127  Heart Rate:  [] 124  Resp:  [16-20] 18  BP: (109-146)/(61-90) 140/65  SpO2:  [90 %-100 %] 100 %    Intake/Output Summary (Last 24 hours) at 1/9/2019 0853  Last data filed at 1/9/2019 0705  Gross per 24 hour   Intake --   Output 1525 ml   Net -1525 ml       GENERAL:  Awake, alert, no acute distress, resting comfortably in bed  HEAD: Normocephalic atraumatic  SCLERA: Anicteric  EXTREMITIES: Warm and well perfused  ABDOMEN:  Soft, appropriately tender primarily along left side, non-distended. No guarding, rigidity, or peritoneal signs.   INCISION:  C/d/i, staples without erythema, small hematoma at umbilicus    LABS:  Lab Results   Component Value Date    WBC 9.5 01/09/2019     Lab Results   Component Value Date    HGB 8.4 01/09/2019     Lab Results   Component Value Date    HCT 24.8 01/09/2019     Lab Results   Component Value Date     01/09/2019     Last Basic Metabolic Panel:  Lab Results   Component Value Date     01/09/2019      Lab Results   Component Value Date    POTASSIUM 4.4 01/09/2019     Lab Results   Component Value Date    CHLORIDE 101 01/09/2019     Lab Results   Component Value Date    MARK 7.3 01/09/2019     Lab Results   Component Value Date    CO2 30 01/09/2019     Lab Results   Component Value Date    BUN 24 01/09/2019     Lab Results   Component Value Date    CR 0.76 01/09/2019     Lab Results   Component Value Date    GLC 68 01/09/2019       ASSESSMENT/PLAN: POD#3 ex lap, right colectomy for hemorrhagic colitis.  Afebrile. Remains in a-fib on tele.  NGT removed yesterday.     1. Advance to full  liquids  2. Decrease IVF to 50  3. Tylenol and dilaudid for pain  4. Continue abx  5. Heparin for prophylaxis  6. OOB, ambulate       For questions/paging, please contact the CRS office at [phone].    Patsy Leone PA-C  [facility]  Phone: [phone]    CRS Staff  Seen and examined independently.  Agree with above.  WBC normalized.  Exam as expected.  I stopped her IV fluids.  Cont to ambulate.  Will recheck hgb tomorrow.  If stable, should be able to start full anticoagulation.    Horacio Armenta MD  Colorectal Surgery  [phone] (office)  [phone] (pager)  [email or web address]

## 2019-01-09 NOTE — PLAN OF CARE
A&O to self, forgetful at times. CMS intact. Bowel sounds hypoactive in LLQ, Patterson pulled-DTV, incontinent at times. VS-O2 NC 4L. On Diltiazem drip decreased from 15ml to 10 ml, did not tolerate titration drip down, increased to 15 ml. NG pulled, diet advanced to clears. Dressing changed to abdomen, incision approximated with staples with blood clot to left side. Up with assist x2 with gb/w. Denies pain. Turned and repo'ed. Plan pending bowel sound returning.

## 2019-01-09 NOTE — PROGRESS NOTES
[facility]  Hospitalist Progress Note   01/09/2019          Assessment and Plan:       Ms. Tita Escobar is an 86-year-old female with a history of atrial fibrillation on Pradaxa, hypertension, previous CVA, and mild cognitive impairment admitted on 1/6/2019 with abdominal pain.     Ischemic colitis with necrotic cecum status post exploratory laparotomy and right colectomy.    The patient presented with abdominal pain for several days, clinically was dehydrated with a significant leukocytosis at 32,000, elevated lactate and CT abdomen concerning for ischemic colitis with prominent right colon bowel wall thickening with mesenteric edema or inflammation and adjacent free fluid with suspicion for pneumatosis.    Underwent emergent exploratory laparotomy and right colectomy on 1/7 by colorectal surgery.  Estimated blood loss 50 mL.  Intraoperatively she was also found to have bloody purulent fluid without any anel perforation.  Peritoneal fluid cultures negative.  Peritoneal specimen showed many WBCs, polymorphonuclear leukocytes, many RBC.  Nasal swab for MRSA negative. Blood cultures negative.  Defer routine postop cares per colorectal surgery  Currently on clear liquids; defer diet advancement to colorectal surgery.  IV fluids/pharmacological DVT prophylaxis/pain management per colorectal surgery.  Continue IV Zosyn [1/6]  Having 1+ pitting pedal edema, crackles bilateral lungs.  And requiring 2 L nasal oxygen.  Strict input output monitoring.  Will hold off diuresis.  Aggressive incentive spirometry.  Wean off supplemental nasal oxygen as able to.    Atrial fibrillation with rapid ventricular response.    The patient does have chronic AFib and sees Dr. Augustine of [facility].    Prior to admission, she is on Pradaxa 150 mg twice daily for anticoagulation and  Cardizem 360 mg daily.    Will restart PTA Cardizem 180 mg extended release as Cardizem 60 mg every 8 hours.  Wean off Cardizem drip as able to.   [Started on 1/6]-Rate adequately controlled at the moment; between 80s-100s  -Pradaxa was reversed with Praxbind pre-operatively. Hold Pradaxa for now, will resume when okay with colorectal surgery.  -Telemetry monitoring  Monitor volume status closely.    Acute anemia likely dilutional/blood loss.  Hemoglobin dropped from 12.0-8.4.  Monitor hemoglobin levels closely, iron studies.     Benign essential hypertension.    -Hold her prior to admission ramipril.  Will restart PTA Cardizem 180 mg extended release as Cardizem 60 mg every 8 hours.  Wean off Cardizem drip as able to.     Mild cognitive impairment with possible mild acute delirium.    -The patient does have history of mild cognitive impairment, and per the daughter, she does get delirious when she is in unfamiliar surroundings or when she is hospitalized.    Appears to be fairly lucid at the moment with occasional forgetfulness.  Delirium protocol.  Neurocognitive assessment as outpatient.    Chronic kidney disease, stage III.   Hyponatremia   Baseline creatinine between 0.9 and 1.1.   Currently creatinine of 0.76.  Avoid nephrotoxic drugs, monitor renal function periodically.    Obesity with a BMI of 33.19.  Consider lifestyle modification as able to.    Physical deconditioning from acute illness/senile fragility.  PT, OT assessment for transition of care.     Orders Placed This Encounter      Full Liquid Diet      DVT Prophylaxis:   Code Status: Full Code  Disposition: Expected discharge in     Patient, interdisciplinary team involved in care and agrees with plan.  Total time -  35 min. More than 50% of time spent in direct patient care, care coordination, patient counseling, and formalizing plan of care.     Sandeep Colindres MD        Interval History:      Patient lying in bed.  Continues to require 2 L nasal oxygen.  Minimal ambulation out of bed to commode.  Denies any chest pain or shortness of breath.  No palpitations.  Tolerating ice chips and per  patient surgery team plan to advance to clear liquids today.         Physical Exam:        Physical Exam   Temp:  [98  F (36.7  C)] 98  F (36.7  C)  Pulse:  [] 125  Heart Rate:  [] 87  Resp:  [16-20] 18  BP: (109-146)/(61-90) 119/64  SpO2:  [90 %-100 %] 91 %    Intake/Output Summary (Last 24 hours) at 1/9/2019 1235  Last data filed at 1/9/2019 1147  Gross per 24 hour   Intake 2468 ml   Output 1500 ml   Net 968 ml       Admission Weight: 78 kg (172 lb)  Current Weight: 82.3 kg (181 lb 7 oz)    PHYSICAL EXAM  GENERAL: Patient is in no distress. Alert and oriented x 2, forgetful   HEART: irregular rate and rhythm. S1S2. Systolic murmur right sternal border   LUNGS: Bilateral decreased breath sounds, faint crackles lower lobe.  ABDOMEN: Soft, no abdominal tenderness, bowel sounds heard   Surgical site dressing intact.  NEURO: Moving all extremities no focal weakness.  EXTREMITIES: 1 + pedal edema. 2+ peripheral pulses.  SKIN: Warm, dry.  Upper extremity bruising.  PSYCHIATRY Cooperative       Medications:          diltiazem  60 mg Oral Q8H DEVON     heparin  5,000 Units Subcutaneous Q8H     piperacillin-tazobactam  2.25 g Intravenous Q6H     acetaminophen, diphenhydrAMINE, fentaNYL, HYDROmorphone, lactated ringers, ondansetron **OR** ondansetron, potassium chloride, potassium chloride with lidocaine, potassium chloride, potassium chloride, potassium chloride, prochlorperazine         Data:      All new lab and imaging data was reviewed.

## 2019-01-09 NOTE — CONSULTS
Care Transition Initial Assessment -   Reason For Consult: discharge planning  Met with: Patient and Family (Caea)  Active Problems:    Ischemic colitis (H)    Colon perforation (H)       DATA  Lives With: ([facility])   Living Arrangements: assisted living  Quality of Family Relationships: involved, supportive  Description of Support System: Supportive, Involved  Who is your support system?: Children  Support Assessment: Adequate family and caregiver support.   Identified issues/concerns regarding health management:        Quality of Family Relationships: involved, supportive    Per care transitions consult for discharge planning and tcu placement on discharge.  Patient was admitted on 1-6-19 with ischemic colitis.  The tentative date of discharge is yet to be determined.  Reviewed chart and spoke with patient and daughter regarding discharge plans.  Per patient and daughter report, patient lives in an apartment at [facility] in the [facility].  Patient states she is pretty independent at baseline.  Patient uses a rolling walker at baseline.  Patient walks the halls for exercise.  Patient has grab bars, a raised toilet seat, and a shower chair in the bathroom.  Patient is currently receiving meals in her room.  Patient receives assistance with medications, bathing, cleaning, and laundry.  Reviewed the therapy discharge recommendations of tcu placement on discharge and patient and daughter are in agreement.  Patient is concerned that if she goes to a tcu she will be unable to return to [facility] as she will lose her bed.  Explained that I will contact [facility] to touch base with them.  Patient's daughter would like for patient to go to [facility] on discharge as her daughter, patient's granddaughter, Kim James, works there as a nurse.  A second choice would be [facility] as patient has been there in the past.  Referrals sent, via discharge on the double, to check bed availability.   Received a call back from [facility], they should have a bed available for patient on discharge.  Calls placed, times 2, and messages left for Allie  at [facility], [phone]..  Awaiting a return call.    ASSESSMENT  Cognitive Status:  awake and alert  Concerns to be addressed: discharge planning.     PLAN  Financial costs for the patient includes N/A.  Patient given options and choices for discharge TCU choices.  Patient/family is agreeable to the plan?  Yes  Patient Goals and Preferences: TCU on discharge.  Patient anticipates discharging to:  TCU.    Will continue to follow and assist with a safe discharge plan.    BINTA Fernando, [facility]  [phone]

## 2019-01-09 NOTE — PLAN OF CARE
Discharge Planner PT   Patient plan for discharge: Not stated  Current status: Patient in supine upon arrival of therapist, agreeable to working with PT. Noted fluctuating OH45w-715's, RN aware and reports okay to see pt.  Instructed pt with heel slides x 10 reps in bed.  Supine>sit with mod A, cues for log rolling as patient noting pain with movement. Patient able to sit at EOB x 10 minutes with B UE support and supervision.  Sit to stand with min A x 1, x 2 reps, amb 5 ft forward and back with FWW and 30ft with min A and 2nd person to manage IV pole and O2 tank (pt on 1L NC), decreased bilateral step height, and flexed forward trunk. Max A x 1 to lift LE into bed, max A x 2 with boosting up in bed. Pt requesting to sleep at end of session.   Barriers to return to prior living situation: Current level of assist, decreased tolerance to activity   Recommendations for discharge: TCU   Rationale for recommendations: Patient would benefit from continued skilled therapy at TCU to further improve strength, balance, and independence with mobility and ambulation to address functional limitations and decrease falls risk.             Entered by: Naomie Rosen 01/09/2019 5:25 PM

## 2019-01-09 NOTE — PLAN OF CARE
Patient is alert and oriented, assist of 2 to bedside commode. Denies chest pain and shortness of breath. Continues on IV zosyn and Dilt drip at 15 ML/HR-Vital signs stable. PRN IV dilaudid given X2 for 5/10 abd pain. Incontinent this shift, purewick in place with good output. Will continue with POC.

## 2019-01-09 NOTE — PLAN OF CARE
Discharge Planner OT   Patient plan for discharge: None stated today.  Current status: Patient confused, impulsive with mobility. Needing MOD  to MIN A and walker to ambulate to bathroom and back. Cueing throughout for safety.  Barriers to return to prior living situation: Level of A with ADL and mobility.  Recommendations for discharge: TCU.  Rationale for recommendations: Would benefit from daily therapy to maximize I in ADL.       Entered by: Carolin Acosta 01/09/2019 10:19 AM

## 2019-01-10 NOTE — PLAN OF CARE
A&Ox2, forgetful. CMS intact. Bowel sounds audible, +flatus, no BM this shift. Voiding adequately, incontinent bladder at times. VS-O2 NC attempted to wean-pt would drop to mid 80's placed back on 2L NC to maintain 90-92%. Diltiazem drip discontinued and initiated PO Diltiazem, HR would fluctuate. Dressing changed. Up with assist x1 with gb/w, up to chair this shift. C/o 7/10 abdominal pain, decreased with Dilaudid 0.3mg x1. Plan pending.

## 2019-01-10 NOTE — PLAN OF CARE
A&Ox4, very forgetful as shift went on. Tele afib CVR w/ BBB. Hypoactive BS, pt stated she is not passing gas at this time. Denies nausea. Given IV dilaudid x2. Dressing CDI. Edema in BUE. A1, incontinent.

## 2019-01-10 NOTE — PROVIDER NOTIFICATION
MD Notification    Notified Person: MD    Notified Person Name:  reza    Notification Date/Time: 1/10/2019, 1628    Notification Interaction:    Purpose of Notification:  Pt. Had large incontinent bloody appearing stool. /88, .    Orders Received:1635-Orders received to check hemoglobin at 2000, stool for occult and to notify Colorectal surgeon about bloody stool.    Comments:

## 2019-01-10 NOTE — PROGRESS NOTES
SW:  D:  Received a call back from Allie from [facility].  Allie states that patient has nothing to worry about in terms of losing her room at [facility].  Allie is asking for an update in terms of where she discharges too and the date.  Explained that we will update her once we have this information.  P:  Will continue to follow.

## 2019-01-10 NOTE — PROVIDER NOTIFICATION
MD Notification    Notified Person: MD    Notified Person Name:  Nirali Abreu Colorectal surgical PA    Notification Date/Time:  1/10/2019, 1650    Notification Interaction:    Purpose of Notification:  Notified per Hospitalist request that pt. Had large bloody stool.     Orders Received:  No new orders received.    Comments:

## 2019-01-10 NOTE — PROVIDER NOTIFICATION
MD Notification    Notified Person: MD    Notified Person Name: Dr. Rogers    Notification Date/Time: 1/10/19 4609    Notification Interaction:paged    Purpose of Notification: hgb 6.7    Orders Received: Orders to transfuse pt and notify [facility].    Comments:   [facility]([phone]) notified per Dr. Rogers's request.    5213 Dr. Louis from colorectal called and on board with 1 unit transfusion.

## 2019-01-10 NOTE — PROGRESS NOTES
[facility]  Hospitalist Progress Note   01/10/2019          Assessment and Plan:       Ms. Tita Escobar is an 86-year-old female with a history of atrial fibrillation on Pradaxa, hypertension, previous CVA, and mild cognitive impairment admitted on 1/6/2019 with abdominal pain.     Ischemic colitis with necrotic cecum status post exploratory laparotomy and right colectomy.    The patient presented with abdominal pain for several days, clinically was dehydrated with a significant leukocytosis at 32,000, elevated lactate and CT abdomen concerning for ischemic colitis with prominent right colon bowel wall thickening with mesenteric edema or inflammation and adjacent free fluid with suspicion for pneumatosis.    Underwent emergent exploratory laparotomy and right colectomy on 1/7 by colorectal surgery. Estimated blood loss 50 mL.  Intraoperatively she was also found to have bloody purulent fluid without any anel perforation.  Peritoneal fluid cultures negative.  Peritoneal specimen showed many WBCs, polymorphonuclear leukocytes, many RBC.  Nasal swab for MRSA negative. Blood cultures negative.  Defer routine postop cares per colorectal surgery  Currently on clear liquids; defer diet advancement to colorectal surgery.  IV fluids/pharmacological DVT prophylaxis/pain management per colorectal surgery.   Continue IV Zosyn [1/6]-defer de-escalation of antibiotics to colorectal surgery.  We will hold off postoperative diuresis -deferred to surgery.    Aggressive incentive spirometry.  Wean off supplemental nasal oxygen as able to.    Acute symptomatic anemia likely dilutional/blood loss.  Hemoglobin dropped from 14> 12.0 > 8.4 >6.7  Serum iron 56, iron saturation index 34.  Patient complaining of dizziness this morning, borderline hypotensive, tachycardic.  Also states has some shortness of breath.  Does not have any active bleeding through nose, no blood in the stools or blood in urine.  Unclear source of  blood loss.  Transfuse 1 unit PRBC, recheck hemoglobin this afternoon.    Conditional order to transfuse if hemoglobin less than 8.  Monitor hemoglobin levels closely  Patient also complaining of abdominal pain, deferred to colorectal surgery for imaging of the abdomen.  Discontinue subcu heparin given severe anemia requiring blood transfusion     Atrial fibrillation with rapid ventricular response.    Has history of AFib and sees Dr. Augustine of [facility].    PTA Pradaxa 150 mg twice daily for anticoagulation and  Cardizem 360 mg daily.    Was on Cardizem drip from 1/6 to 1/9 and started on Cardizem 60 mg every 8 hours.  Pradaxa was reversed with Praxbind pre-operatively. Hold Pradaxa for now, will resume when okay with colorectal surgery.  Telemetry monitoring    Volume overload likely due to fluid resuscitation.  Having 1+ pitting pedal edema, crackles bilateral lungs.  And requiring 2 L nasal oxygen.  Strict input output monitoring.   Was in A. fib with RVR postoperatively and on Cardizem drip  Echocardiogram from 2015 showing EF at 55-60%, mildly decreased right ventricular systolic function with mild to moderate TR.  Repeat echocardiogram for evaluation of heart function as patient states she has some shortness of breath and does not feel well.  We will hold off postoperative diuresis -deferred to surgery.      Benign essential hypertension.    Hold her prior to admission ramipril given borderline blood pressures.  Continue Cardizem 60 mg every 8 hours with hold parameters.     Mild cognitive impairment with possible mild acute delirium.    The patient does have history of mild cognitive impairment, and per the daughter, she does get delirious when she is in unfamiliar surroundings or when she is hospitalized.    Appears to be fairly lucid at the moment with occasional forgetfulness.  Delirium protocol.  Neurocognitive assessment as outpatient.    Chronic kidney disease, stage III.   Hyponatremia   Baseline  creatinine between 0.9 and 1.1.   Currently creatinine of 0.76.  Avoid nephrotoxic drugs, monitor renal function periodically.    Obesity with a BMI of 33.19.  Consider lifestyle modification as able to.    Physical deconditioning from acute illness/senile fragility.  PT, OT assessment for transition of care.     Orders Placed This Encounter      Full Liquid Diet      DVT Prophylaxis: When she will compression device.  Hold pharmacological prophylaxis given anemia requiring blood transfusion.  Code Status: Full Code  Disposition: Expected discharge >2 days pending clinical improvement    Patient, interdisciplinary team involved in care and agrees with plan.  Total time > 35 min [discussed with patient, patient's RN,  care coordinator]. More than 50% of time spent in direct patient care, care coordination, patient counseling, and formalizing plan of care.     Sandeep Colindres MD        Interval History:      Patient lying in bed.  Continues to require 2 L nasal oxygen.  Minimal ambulation out of bed to commode.  Denies any chest pain or shortness of breath.  No palpitations.  Tolerating ice chips and per patient surgery team plan to advance to clear liquids today.         Physical Exam:        Physical Exam   Temp:  [97.5  F (36.4  C)-98.1  F (36.7  C)] 97.5  F (36.4  C)  Pulse:  [125-128] 128  Heart Rate:  [] 116  Resp:  [16-18] 16  BP: (119-140)/(64-80) 127/80  SpO2:  [91 %-98 %] 98 %    Intake/Output Summary (Last 24 hours) at 1/9/2019 1235  Last data filed at 1/9/2019 1147  Gross per 24 hour   Intake 2468 ml   Output 1500 ml   Net 968 ml       Admission Weight: 78 kg (172 lb)  Current Weight: 82.3 kg (181 lb 7 oz)    PHYSICAL EXAM  GENERAL: Patient is in no distress. Alert and oriented x 2, forgetful   HEART: irregular rate and rhythm. S1S2. Systolic murmur right sternal border   LUNGS: Bilateral decreased breath sounds, faint crackles lower lobe.  ABDOMEN: Soft, no abdominal tenderness, bowel  sounds heard   Surgical site dressing intact.  NEURO: Moving all extremities no focal weakness.  EXTREMITIES: 1 + pedal edema. 2+ peripheral pulses.  SKIN: Warm, dry.  Upper extremity bruising.  PSYCHIATRY Cooperative       Medications:          diltiazem  60 mg Oral Q8H DEVON     heparin  5,000 Units Subcutaneous Q8H     piperacillin-tazobactam  2.25 g Intravenous Q6H     sodium chloride 0.9%, acetaminophen, diphenhydrAMINE, fentaNYL, HYDROmorphone, lactated ringers, ondansetron **OR** ondansetron, potassium chloride, potassium chloride with lidocaine, potassium chloride, potassium chloride, potassium chloride, prochlorperazine         Data:      All new lab and imaging data was reviewed.

## 2019-01-10 NOTE — PLAN OF CARE
PT Note 1/10/2019 10:48 AM    Per chart review pt with 6.7 Hgb and RN requested PT to hold for the day.

## 2019-01-10 NOTE — PLAN OF CARE
A&OX3 disoriented on time, forgetful. HR fluctuating in the 100's,  on 1 L nasal cannula. Denies SOB/CP.Tele- AFib CVR, HR in the 120's at times. Complains of abdominal pain,PO tylenol and IV Dilaudid administered this shift. On clear liquid diet, turn and repositioned, up with assist of one with walker.Incontinent, purewick used this shift. +1 BLE Edema,  incision site on the abdomen CDI. On IV Zosyn. Used incentive spirometer 3 time this night.

## 2019-01-10 NOTE — PROGRESS NOTES
COLON & RECTAL SURGERY  PROGRESS NOTE    January 10, 2019  Post-op Day # 4 ex lap, right colectomy for ischemic colitis    SUBJECTIVE:  Pt was working with PT when I walked into the room. She has no new concerns at this time.     OBJECTIVE:  Temp:  [97.5  F (36.4  C)-98.1  F (36.7  C)] 97.5  F (36.4  C)  Pulse:  [125-128] 128  Heart Rate:  [] 116  Resp:  [16-18] 16  BP: (119-140)/(64-80) 127/80  SpO2:  [91 %-98 %] 98 %    Intake/Output Summary (Last 24 hours) at 1/10/2019 0738  Last data filed at 1/10/2019 0615  Gross per 24 hour   Intake 2618 ml   Output 150 ml   Net 2468 ml       GENERAL:  Awake, alert, no acute distress, sitting at edge of bed  HEAD: Nomocephalic atraumatic  SCLERA: anicteric  EXTREMITIES: warm and well perfused    LABS:  Lab Results   Component Value Date    WBC 7.8 01/10/2019     Lab Results   Component Value Date    HGB 6.7 01/10/2019     Lab Results   Component Value Date    HCT 20.2 01/10/2019     Lab Results   Component Value Date     01/10/2019     Last Basic Metabolic Panel:  Lab Results   Component Value Date     01/10/2019      Lab Results   Component Value Date    POTASSIUM 4.2 01/10/2019     Lab Results   Component Value Date    CHLORIDE 103 01/10/2019     Lab Results   Component Value Date    MARK 7.3 01/10/2019     Lab Results   Component Value Date    CO2 31 01/10/2019     Lab Results   Component Value Date    BUN 20 01/10/2019     Lab Results   Component Value Date    CR 0.81 01/10/2019     Lab Results   Component Value Date    GLC 71 01/10/2019       ASSESSMENT/PLAN: POD 4 ex lap, right colectomy for ischemic colitis. Pt remains afebrile, tachycardic to 116, normotensive. Her hgb has drifted down to 6.7, order for transfuse 1unit PRBC.     1. Continue full liquid diet  2. Encourage OOB, ambulate  3. Heparin for prophylaxis  4. PRN PO pain control  5. PT/OT  6. Will consider imaging abdomen pending response of hgb following transfusion this morning    For  questions/paging, please contact the CRS office at [phone].    Kamini Vela PA-C  [facility]  Phone: [phone]     Col[facility] Attending Note    Patient seen and examined independently.  Agree with above assessment and plan.  Seen 8:30am  Sitting up in bed, feeling pretty well. Pain minimal  Was up walking but low BP, now back in bed.   Getting 1 unit for hgb of 6.7  abd soft, non-tender  Plan   Transfuse and follow up on HGB  Hold anticoagulation until HGB stable.  Zosyn day 4 of 5.    Christine Louis MD  [facility]  Office Phone # [phone]

## 2019-01-10 NOTE — PROVIDER NOTIFICATION
Brief update:    Paged re: hgb of 6.7.    Hemoglobin of 14.1 on admission 1/6/19.  Preoperatively, hemoglobin drifted down to 12.7.  Postoperatively, hemoglobin 9.8 on January 8, 8.4 on January 9, now 6.7.    No blood loss has been noted by nursing staff.    Updating type and screen this morning  Order to transfuse 1 unit packed red blood cells for postoperative acute blood loss anemia.    Patient's abdominal pain has been persistent, though stable.  Request colorectal surgery be updated with worsening anemia.  Defer CT imaging to rule out intra-abdominal hematoma to surgical service, likely after rounding this a.m.  I do not believe CT needs to be done emergently given abdominal pain has been stable, and hemoglobin drop has been steady    Justin Rogers MD  6:22 AM

## 2019-01-10 NOTE — PLAN OF CARE
"Discharge Planner OT   Patient plan for discharge: None stated.  Current status: Declines wearing o2 as she states she was a \"lead [employer] \" and she doesn't need it. Sats to 88% on RA after ambulation to bathroom, but poor signal. Quickly went to 97% on RA. BP stable throughout.   Barriers to return to prior living situation: Level of A with ADL and mobility.   Recommendations for discharge: TCU.  Rationale for recommendations: Not at baseline.        Entered by: Carolin Acosta 01/10/2019 8:54 AM     "

## 2019-01-11 NOTE — CONSULTS
[facility]    Cardiology Consultation     Date of Admission:  1/6/2019  Date of Consult (When I saw the patient): 01/11/19      Assessment & Plan   Tita Escobar is a 86 year old female who was admitted on 1/6/2019 with abdominal pain. She carries a PMH significant for chronic atrial fibrillation on chronic Pradaxa, CVA, hypertension, and hyperlipidemia.     1.  : Chronic atrial fibrillation w/ episode of RVR  - Currently in A-fib with rates ranging  80-90's on Diltiazem gtt 15 mg/hr  - NPO per Colorectal surgery  - Anticoagulation on hold, frequent blood stools this am.   - Hgb dropped 6.7 yesterday, Transfused 1 unit with follow up Hgb 9.0  - Follow up echo confirmed EF 55-60%, mild/mod MR/TR    2.  : Hypertension   - Well controlled on diltiazem     3.  : Ischemic Colitis  - s/p Right colectomy  - Followed by surgery    Plan  - Continue on diltiazem gtt for rate control  - Transition to PO diltiazem when able to tolerate PO  - resume anticoagulation when cleared by surgery.      Code Status    Full Code    Reason for Consult   Reason for consult: Atrial Fibrillation w/ RVR    Primary Care Physician   Physician No Ref-Primary    Chief Complaint   Abdominal pain    History is obtained from the patient    History of Present Illness   Tita Escobar is a 86 year old female who presents to the ER on 1/6/19 with abdominal pain. She states she had symptoms for probably weeks prior to presenting to the ER. She was found to be dehydrated with significant leukocytosis at 32,000, elevated lactate and CT of the abdomen concerning for ischemic colitis. She was consulted by colorectal surgery and diagnosed with acute ischemic colitis. She subsequently went to the OR for a right colectomy. Per OR note,There is an area of necrosis but without obvious perforation of the cecum.  There was purulent bloody fluid found throughout the abdomen.  We performed a right colectomy with resection of approximately 20 cm of  the terminal ileum and performed a side-to-side functional end-to-end ileocolic anastomosis. She was started on Zosyn post op. She has been doing well until 1/10/19 when she was noted to have a drop in hemoglobin to 6.7, requiring transfusion. Today she is noted to have frequent bloody stools. Hemoglobin is stable at 9. Per colorectal surgery she is now NPO w/ sips only.     She has chronic atrial fibrillation, states she has had an irregular heart beat since she was 12 yrs old. She is [facility] Dr. Augustine at  [facility] and well managed on diltiazem and Pradaxa. She is noted to have allergies to metoprolol, flecainide, and Coumadin. She had been restarted on PO diltiazem on 1/9/19. Early this am, she was noted to have rapid rates ranging from 120-140. At 0530 this am, she was started on a diltiazem drip at 15mg /hr controlling rates in the 70-80's. Anticoagulation is on hold until cleared by surgery. Echocardiogram confirmed confirmed EF 55-60%, mild/mod MR/TR. She does take PTA ramipril and spironolactone.  Diuretics have been held. She does continue on 1 L/ NC with sats at 96%. Blood pressure is stable in the 120's systolic. Today, she denies any cardiac complaints, denies chest pain, shortness of breath, palpitations, presyncope, syncope, PND, orthopnea. She is noted to have trace ankle edema. Her primary complaint is abdominal pain.       Past Medical History   I have reviewed this patient's medical history and updated it with pertinent information if needed.   Past Medical History:   Diagnosis Date     Asthma      Chronic kidney disease     acute kidney failure     CVA (cerebral vascular accident) (H) 2009     Dehydration      Depressive disorder      E. coli UTI      Gastroesophageal reflux disease      Heart disease      Hyperkalemia      Hyperlipidaemia      Hypertension      Hyponatremia      Mild cognitive impairment      Persistent atrial fibrillation (H)      Rupture of uterus 1957     UTI (urinary tract  infection)        Past Surgical History   I have reviewed this patient's surgical history and updated it with pertinent information if needed.  Past Surgical History:   Procedure Laterality Date     COLECTOMY RIGHT N/A 1/6/2019    Procedure: COLECTOMY RIGHT;  Surgeon: Horacio Armenta MD;  Location: [facility] OR     HYSTEROSCOPY  2014    x 2      KNEE SURGERY  2014       Prior to Admission Medications   Prior to Admission Medications   Prescriptions Last Dose Informant Patient Reported? Taking?   Psyllium (NATURAL FIBER LAXATIVE PO) PRN  Yes Yes   Sig: Take 1 teaspoonful by mouth daily as needed   Sennosides (SENNA) 8.6 MG CAPS 1/6/2019 at 0800  Yes Yes   Sig: Take 8.6 mg by mouth daily    acetaminophen (TYLENOL) 325 MG tablet 1/5/2019 at 2000  Yes Yes   Sig: Take 650 mg by mouth daily    acetaminophen (TYLENOL) 325 MG tablet PRN  Yes Yes   Sig: Take 650 mg by mouth every 4 hours as needed for mild pain   albuterol (PROAIR HFA, PROVENTIL HFA, VENTOLIN HFA) 108 (90 BASE) MCG/ACT inhaler PRN  Yes Yes   Sig: Inhale 2 puffs into the lungs every 4 hours as needed    dabigatran ANTICOAGULANT (PRADAXA ANTICOAGULANT) 150 MG CAPS capsule 1/6/2019 at 0800  No Yes   Sig: Take 1 capsule (150 mg) by mouth 2 times daily   diltiazem (TIAZAC) 180 MG 24 hr ER beaded capsule 1/6/2019 at 0800  No Yes   Sig: Take 2 capsules (360 mg) by mouth daily Two tabs daily in am   diphenhydrAMINE (DIPHENHIST) 25 MG capsule PRN  Yes Yes   Sig: Take 25 mg by mouth 2 times daily as needed for itching or allergies    hypromellose-dextran (ARTIFICAL TEARS) 0.1-0.3 % ophthalmic solution PRN  Yes Yes   Sig: Place 1 drop into both eyes every hour as needed for dry eyes   melatonin 5 MG tablet PRN  Yes Yes   Sig: Take 5 mg by mouth nightly as needed for sleep   prazosin (MINIPRESS) 1 MG capsule 1/6/2019 at 0800  No Yes   Sig: Take 1 capsule (1 mg) by mouth 2 times daily   ramipril (ALTACE) 10 MG capsule 1/6/2019 at 0800  No Yes   Sig: Take 1 capsule (10 mg) by  mouth 2 times daily   sertraline (ZOLOFT) 25 MG tablet 1/5/2019 at 2000  Yes Yes   Sig: Take 25 mg by mouth daily   simethicone (MYLICON) 80 MG chewable tablet 1/5/2019 at 0800  Yes Yes   Sig: Take 80 mg by mouth every morning   simethicone (MYLICON) 80 MG chewable tablet PRN  Yes Yes   Sig: Take 80 mg by mouth every 6 hours as needed for flatulence or cramping   spironolactone (ALDACTONE) 25 MG tablet 1/6/2019 at 0800  Yes Yes   Sig: Take 12.5 mg by mouth daily 1/2 tablet x 25mg =12.5mg      Facility-Administered Medications: None     Allergies   Allergies   Allergen Reactions     Benicar [Olmesartan] Shortness Of Breath     Aspirin      Augmentin Diarrhea     Flecainide      Hctz [Hydrochlorothiazide]      Latex      Metoprolol      Norvasc [Amlodipine]      Vasotec [Enalapril]      Warfarin        Social History   I have reviewed this patient's social history and updated it with pertinent information if needed. Tita Escobar  reports that she quit smoking about 37 years ago. she has never used smokeless tobacco. She reports that she drinks alcohol. She reports that she does not use drugs.    Family History   I have reviewed this patient's family history and updated it with pertinent information if needed.   Family History   Problem Relation Age of Onset     Heart Disease Father      Hypertension Father      Hypertension Sister        Review of Systems   The 10 point Review of Systems is negative other than noted in the HPI or here.    Physical Exam   Temp: 98.8  F (37.1  C) Temp src: Oral BP: 115/80 Pulse: 79 Heart Rate: 142(dilt drip initiated) Resp: 16 SpO2: 95 % O2 Device: Nasal cannula Oxygen Delivery: 1 LPM  Vital Signs with Ranges  Temp:  [98  F (36.7  C)-98.8  F (37.1  C)] 98.8  F (37.1  C)  Pulse:  [] 79  Heart Rate:  [102-142] 142  Resp:  [14-20] 16  BP: (105-152)/() 115/80  MAP:  [16 mmHg] 16 mmHg  SpO2:  [89 %-98 %] 95 %  185 lbs 10.04 oz    Constitutional: awake, alert, cooperative, no  apparent distress, and appears stated age  ENT: normocepalic, without obvious abnormality  Hematologic / Lymphatic: no cervical lymphadenopathy  Respiratory: No increased work of breathing, good air exchange, clear to auscultation bilaterally, no crackles or wheezing. Decreased bases bilaterally  Cardiovascular: Irregular rhythm, reg rate, no murmur.   GI: Abdominal pain, tenderness, bloody stools  Genitounirinary: incontinent  Skin: abdominal incision intact  Musculoskeletal: trace lower extremity pitting edema present  Neurologic: Awake, alert, oriented to name, place and time.   Neuropsychiatric: General: normal, calm and normal eye contact    Data   Reviewed     MAKSIM Anderson CNP 1/11/2019

## 2019-01-11 NOTE — PROGRESS NOTES
SPIRITUAL HEALTH SERVICES Progress Note  [facility]    Initial visit per LOS.  Pt pleasantly declined offer of a [facility] visit and cites no current needs/concerns.  SH remains available per request.                                                                                                                                            Jj Mcgregor M.Div., [facility]  Staff   Pager [phone]

## 2019-01-11 NOTE — PROVIDER NOTIFICATION
Paged Dr. Rogres at approximately 0045 regarding patient sustaining HR in the 120s even after oral diltiazem. Noting patient is allergic to metoprolol, received order to start a diltiazem drip.

## 2019-01-11 NOTE — PROVIDER NOTIFICATION
Brief update:    Pt with afib RVR    Has allergy listed to metoprolol; unclear what allergy is, pt confused    On diltiazem PO, but with RVR, initiating diltiazem ggt.    Justin Rogers MD  1:11 AM

## 2019-01-11 NOTE — PLAN OF CARE
POD 5 Colon resection. Patient is oriented but intermittently confused/forgetful. Heart rate sustaining in the 120-140s after PO diltiazem, notified MD and started IV diltiazem drip. Currently infusing at 15/hr, HR staying in low 100s. Tele Afib RVR. Other VSS on 1L NC. Attempted to wean to room air unsuccessfully. Pain controlled on IV dilauded and PRN tylenol. Midline abdominal incision open to air and WDL. Incontinent of urine- purewick in place. Hgb down to 9.0. Having gas pains but did not have another bowel movement overnight, still need to obtain an occult stool sample. Mepilex on bottom CDI. Up with assist of 1. Discharge plan pending.       0645 addendum- Patient had large, watery, bloody stool this morning. Hemoglobin remains stable at 9.0. Hospitalist paged, will pass on to day shift RN.

## 2019-01-11 NOTE — PLAN OF CARE
Neuro:forgetful at times  CV/Rhythm:a fib, dilt gtt  Resp/02:1 L NC  GI/Diet:rectal bleeding this am  :purepick placed this afternoon, incontinent  Skin/Incisions/Sites:barrier cream, purewick, sacral mepilex  Pulses/CMS:intact  Edema:1+  Activity/Falls Risk:fall risk, bed rest  Lines/Drains/IVs:PIV x2  Labs/BGM:hgb 9.1  Test/Procedures:-  VS/Pain:stable, HR up to 120  DC Plan:? Tcu?  Other:q4hr hgb

## 2019-01-11 NOTE — PLAN OF CARE
Discharge Planner PT   Patient plan for discharge: Did not state  Current status: RN requested to hold off any OOB mobility at this time. Pt was having multiple bouts of stool incontinence with movement. Pt participated in supine LE there ex with PT's guidance. Pt is at min assist with verbal cues for rolling in bed.   Barriers to return to prior living situation: Fall risk, Level of assistance needed, decreased strength and activity tolerance.   Recommendations for discharge: TCU  Rationale for recommendations: Pt will benefit from continued skilled PT to achieve increase strength and independence with functional mobility.        Entered by: Leonard Alex 01/11/2019 1:19 PM

## 2019-01-11 NOTE — PROVIDER NOTIFICATION
MD Notification    Notified Person: MD    Notified Person Name:  Bernadine    Notification Date/Time: 1/11/2019 0700    Notification Interaction: Pager    Purpose of Notification: FYI- patient had another large watery bloody BM this morning. Hemoglobin remains stable at 9.0.    Orders Received:    Comments:

## 2019-01-11 NOTE — PROGRESS NOTES
[facility]  Hospitalist Progress Note   01/11/2019          Assessment and Plan:       Ms. Tita Escobar is an 86-year-old female with a history of atrial fibrillation on Pradaxa, hypertension, previous CVA, and mild cognitive impairment admitted on 1/6/2019 with abdominal pain.     Ischemic colitis with necrotic cecum status post exploratory laparotomy and right colectomy.    Presented with abdominal pain for several days, clinically dehydrated with WBC of 32,000, elevated lactate.  CT abdomen concerning for ischemic colitis with prominent right colon bowel wall thickening with mesenteric edema or inflammation and adjacent free fluid with suspicion for pneumatosis.    Underwent emergent exploratory laparotomy and right colectomy on 1/7 by colorectal surgery. Estimated blood loss 50 mL.  Intraoperatively found to have bloody purulent fluid without any anel perforation.  Peritoneal fluid cultures negative.  Peritoneal specimen showed many WBCs, polymorphonuclear leukocytes, many RBC.  Nasal swab for MRSA negative. Blood cultures negative.  Defer routine postop cares per colorectal surgery  Continues to have abdominal pain with bloody stools and nausea with drop in hemoglobin, lactic acid 0.5.  Bowel sounds heard.  Switch to n.p.o.   GI prophylaxis with IV famotidine 20 mg twice daily.  Defer postoperative IV fluids/IV antibiotics and pain management to colorectal surgery.  Continue IV Zosyn [1/6]- defer de-escalation of antibiotics to colorectal surgery.  Have discussed with  this morning -continue to monitor hemoglobin levels.  Appreciate colorectal surgery comanagement.  Aggressive incentive spirometry.  Wean off supplemental nasal oxygen as able to.    Acute symptomatic anemia likely dilutional/blood loss.  Status post 1 PRBC transfusion 1/10.  Hemoglobin dropped from 14> 12.0 > 8.4 >6.7.  Received 1 unit blood transfusion and hemoglobin improved to 9.0. Serum iron 56, iron saturation  index 34.  Patient continues to complain of dizziness/does not feel well.  Having intermittent dark colored stools.  Discussed with Dr. Haines and impression likely old blood.  Recommended continue monitoring hemoglobin levels every 4 hours.  Discussed with patient's RN if continues to have bloody stools will monitor every 4 hours.  Overnight can switch to every 6 hours if no further bloody stools so as to let her sleep   Conditional order to transfuse if hemoglobin less than 8.  Discontinued subcu heparin given anemia requiring blood transfusion/ongoing bloody stools.     Atrial fibrillation with rapid ventricular response -likely due to surgical stress/volume overload  Has history of AFib and sees Dr. Augustine of [facility].    PTA Pradaxa 150 mg twice daily for anticoagulation and  Cardizem 360 mg daily.    TSH 3.31.  Pradaxa was reversed with Praxbind pre-operatively.  Was on Cardizem drip from 1/6 to 1/9 and switch to oral Cardizem.  Overnight had RVR, restarted back Cardizem drip.  Continue IV Cardizem drip, cardiology consulted for comanagement ( patient anxious regarding elevated heart rate and would like her primary cardiologist to be involved in her care]  Hold Pradaxa for now, will resume when okay with colorectal surgery.  Telemetry monitoring    Volume overload likely due to fluid resuscitation/postoperative.  Having 1+ pitting pedal edema, crackles bilateral lungs.  And requiring 2 L nasal oxygen.    Echocardiogram estimated EF at 55-60%, right ventricle mildly dilated, mild to moderate MR/TR.  Right ventricular systolic pressure elevated consistent with moderate pulmonary hypertension.  No regional wall motion abnormalities.  Strict input output  monitoring.  We will hold off postoperative diuresis -deferred to surgery.      Benign essential hypertension.    Hold her prior to admission ramipril given borderline blood pressures.  Currently on IV Cardizem drip.     Mild cognitive impairment with possible mild  acute delirium.    The patient does have history of mild cognitive impairment, and per the daughter, she does get delirious when she is in unfamiliar surroundings or when she is hospitalized.    Appears to be fairly lucid at the moment with occasional forgetfulness  Delirium protocol -minimize interruptions at night if able to.  Neurocognitive assessment as outpatient.    Chronic kidney disease, stage II.   Hyponatremia  improved   Baseline creatinine between 0.9 and 1.1.   Currently creatinine of 0.76.  Avoid nephrotoxic drugs, monitor renal function periodically.    Obesity with a BMI of 33.19.  Consider lifestyle modification as able to.    Physical deconditioning from acute illness/senile fragility.  PT, OT assessment for transition of care.     Orders Placed This Encounter      Full Liquid Diet      DVT Prophylaxis: SCD, ambulate.   Code Status: Full Code  Disposition: Expected discharge >2 days pending clinical improvement    Patient, interdisciplinary team involved in care and agrees with plan.  Total time > 35 min [discussed with colorectal surgery team,  RN,  care coordinator]. More than 50% of time spent in direct patient care, care coordination, patient counseling, and formalizing plan of care.     Sandeep Colindres MD        Interval History:      Patient lying in bed.  Continues to require 2 L nasal oxygen.  Minimal ambulation out of bed to commode.    This morning had 2 large bloody bowel movements.  Patient complaining of vomiting, has nausea.  [Noticed minimal amount of watery vomitus on tissue and as per RN no vomiting]  Appears slightly confused this morning, able to answer all simple commands.  Insists she would like her primary cardiologist to be involved in her care.  Denies any chest pain or shortness of breath.  No palpitations.  Overnight had A. fib with RVR requiring IV Cardizem drip         Physical Exam:        Physical Exam   Temp:  [97  F (36.1  C)-98.6  F (37  C)] 98.4  F  (36.9  C)  Pulse:  [] 139  Heart Rate:  [102-142] 142  Resp:  [14-20] 20  BP: (105-152)/() 139/101  MAP:  [16 mmHg] 16 mmHg  SpO2:  [86 %-98 %] 97 %    Intake/Output Summary (Last 24 hours) at 1/9/2019 1235  Last data filed at 1/9/2019 1147  Gross per 24 hour   Intake 2468 ml   Output 1500 ml   Net 968 ml     Admission Weight: 78 kg (172 lb)  Current Weight: 82.3 kg (181 lb 7 oz)    PHYSICAL EXAM  GENERAL: Patient is in no distress. Alert and oriented x 2, forgetful   HEART: irregular rate and rhythm. S1S2. Systolic murmur right sternal border   LUNGS: Bilateral decreased breath sounds, faint crackles lower lobe.  ABDOMEN: Soft, no abdominal tenderness, bowel sounds heard   Surgical site dressing intact.  Staples appear to be in place.  NEURO: Moving all extremities no focal weakness.  EXTREMITIES: 1 + pedal edema. 2+ peripheral pulses.  SKIN: Warm, dry   PSYCHIATRY Cooperative       Medications:          piperacillin-tazobactam  2.25 g Intravenous Q6H     sodium chloride 0.9%, acetaminophen, diphenhydrAMINE, fentaNYL, HYDROmorphone, lactated ringers, ondansetron **OR** ondansetron, potassium chloride, potassium chloride with lidocaine, potassium chloride, potassium chloride, potassium chloride, prochlorperazine         Data:      All new lab and imaging data was reviewed.

## 2019-01-11 NOTE — PROGRESS NOTES
COLON & RECTAL SURGERY  PROGRESS NOTE    January 11, 2019  Post-op Day # 5 ex lap, right colectomy for ischemic colitis    SUBJECTIVE:  Pt reports her pain increases with movement, but is manageable at rest. With movement she is incontinent of stool and urine. She was coughing this morning and has been productive of phlegm. She states she is passing a lot of gas.   Spoke with nurse who reports large incontinent stool at 0645, 0715, and most recent at 0800 she saw and reports about 100cc of primarily watery bloody stool.     OBJECTIVE:  Temp:  [97  F (36.1  C)-98.6  F (37  C)] 98.4  F (36.9  C)  Pulse:  [] 139  Heart Rate:  [102-142] 142  Resp:  [14-20] 20  BP: (105-152)/() 139/101  MAP:  [16 mmHg] 16 mmHg  SpO2:  [86 %-98 %] 97 %    Intake/Output Summary (Last 24 hours) at 1/11/2019 0800  Last data filed at 1/10/2019 1738  Gross per 24 hour   Intake 1250 ml   Output --   Net 1250 ml       GENERAL:  Awake, alert, no acute distress, lying in bed  HEAD: Nomocephalic atraumatic  SCLERA: anicteric  EXTREMITIES: warm and well perfused  ABDOMEN:  Soft, tenderness greatest in RLQ, non-distended, no rebound or guarding, no peritoneal signs.   INCISION:  C/d/i, staples without erythema    LABS:  Lab Results   Component Value Date    WBC 9.9 01/11/2019     Lab Results   Component Value Date    HGB 9.0 01/11/2019     Lab Results   Component Value Date    HCT 26.2 01/11/2019     Lab Results   Component Value Date     01/11/2019     Last Basic Metabolic Panel:  Lab Results   Component Value Date     01/11/2019      Lab Results   Component Value Date    POTASSIUM 4.1 01/11/2019     Lab Results   Component Value Date    CHLORIDE 100 01/11/2019     Lab Results   Component Value Date    MARK 7.8 01/11/2019     Lab Results   Component Value Date    CO2 27 01/11/2019     Lab Results   Component Value Date    BUN 20 01/11/2019     Lab Results   Component Value Date    CR 0.67 01/11/2019     Lab Results    Component Value Date    GLC 95 01/11/2019       ASSESSMENT/PLAN:  POD 5 ex lap, right colectomy for ischemic colitis. Pt received 1unit PRBC on 1/10, hgb stable at 9.0. Pt also remains afebrile, tachycardic to 150, hypertensive to 140s/100s.     No urgent surgery indicated, continue to closely clinically monitor. Given blood noted in colon at time of surgery, initial bloody stools is expected    1. Full liquid diet  2. Serial hgb every 4 hours  3. Continue antibiotics  4. PT/OT    For questions/paging, please contact the CRS office at [phone].    Kamini Vela PA-C  [facility]  Phone: [phone]     CRS Staff  Seen and examined.  Passing some bloody stools.  I examined this.  Stool is dark.  This is expected as dark stool was noted in her colon at the time of the colectomy.  Hgb stable.  I expect this is just shed blood she is passing, and not active bleeding.  Abdomen soft. Tender as expected.  No peritoneal signs.  Wound looks good.      Keep NPO today except for sips of meds and ice chips.  Hold anticoagulation for now.   Trend hgb.    I doubt further imaging would be helpful at this time.  Can plan to stop the zosyn after today's doses.  Will follow.    Horacio Armenta MD  Colorectal Surgery  [phone] (office)  [phone] (pager)  [email or web address]

## 2019-01-12 NOTE — PLAN OF CARE
Discharge Planner PT   Patient plan for discharge: Home  Current status: Pt mod assist for supine to sit at EOB, able to reposition in bed with min assist and verbal cues. Pt sit to stand with min assist. Pt able to perform standing exercises with min assist with FWW for balance. Pt able to ambulate 15' with FWW and min assist.  Barriers to return to prior living situation: Decreased activity tolerance, decreased strength, decreased balance, current level of assist  Recommendations for discharge: TCU  Rationale for recommendations: Pt is below baseline level of function, recommend cont PT to further increase strength, balance, and overall independence and safety with functional mobility tasks.       Entered by: Krista Richmond 01/12/2019 4:24 PM

## 2019-01-12 NOTE — PROGRESS NOTES
COLON & RECTAL SURGERY  PROGRESS NOTE    January 11, 2019  Post-op Day # 6 ex lap, right colectomy for ischemic colitis    SUBJECTIVE:  Pain well controlled. No nausea. Distension improved. Remains on diltiazem drip for a fib control. Currently rate controlled. Having bowel function.     OBJECTIVE:  Temp:  [98.1  F (36.7  C)-98.7  F (37.1  C)] 98.1  F (36.7  C)  Pulse:  [] 83  Heart Rate:  [] 89  Resp:  [16-18] 18  BP: (107-149)/(47-96) 127/57  SpO2:  [91 %-100 %] 94 %    Intake/Output Summary (Last 24 hours) at 1/11/2019 0800  Last data filed at 1/10/2019 1738  Gross per 24 hour   Intake 1250 ml   Output --   Net 1250 ml       GENERAL:  Awake, alert, no acute distress, lying in bed  HEAD: Nomocephalic atraumatic  SCLERA: anicteric  EXTREMITIES: warm and well perfused  ABDOMEN:  Soft, mild tenderness, non-distended, no rebound or guarding, no peritoneal signs.   INCISION:  C/d/i, staples without erythema    LABS:  Lab Results   Component Value Date    WBC 9.9 01/11/2019     Lab Results   Component Value Date    HGB 9.0 01/11/2019     Lab Results   Component Value Date    HCT 26.2 01/11/2019     Lab Results   Component Value Date     01/11/2019     Last Basic Metabolic Panel:  Lab Results   Component Value Date     01/11/2019      Lab Results   Component Value Date    POTASSIUM 4.1 01/11/2019     Lab Results   Component Value Date    CHLORIDE 100 01/11/2019     Lab Results   Component Value Date    MARK 7.8 01/11/2019     Lab Results   Component Value Date    CO2 27 01/11/2019     Lab Results   Component Value Date    BUN 20 01/11/2019     Lab Results   Component Value Date    CR 0.67 01/11/2019     Lab Results   Component Value Date    GLC 95 01/11/2019       ASSESSMENT/PLAN:  POD 6 ex lap, right colectomy for ischemic colitis.    Hemoglobin stable over past 24 hours. Bloody stools have improved. Would continue to check daily Hgb.  May resume clears this morning and full liquid diet in PM if  tolerates.  Continue antibiotics per medical service.  Ok to resume DVT chemoprophylaxis. Would hold full anticoagulation for now.   PT/OT.  Appreciate Hospitalist assistance in the care of this patient.    Please call with further questions or concerns.     Mercedes Land MD  [facility]  Phone: [phone]

## 2019-01-12 NOTE — PROGRESS NOTES
[facility]  Hospitalist Progress Note   01/12/2019          Assessment and Plan:       Ms. Tita Escobar is an 86-year-old female with a history of atrial fibrillation on Pradaxa, hypertension, previous CVA, and mild cognitive impairment admitted on 1/6/2019 with abdominal pain.     Ischemic colitis with necrotic cecum status post exploratory laparotomy and right colectomy.    Presented with abdominal pain for several days, clinically dehydrated with WBC of 32,000, elevated lactate.  CT abdomen concerning for ischemic colitis with prominent right colon bowel wall thickening with mesenteric edema or inflammation and adjacent free fluid with suspicion for pneumatosis.    Underwent emergent exploratory laparotomy and right colectomy on 1/7 by colorectal surgery. Estimated blood loss 50 mL.  Intraoperatively found to have bloody purulent fluid without any anel perforation.  Peritoneal fluid cultures negative.  Peritoneal specimen showed many WBCs, polymorphonuclear leukocytes, many RBC.  Nasal swab for MRSA negative. Blood cultures negative.  Defer routine postop cares per colorectal surgery  Continues to have abdominal pain.  Last bloody bowel movement 1/11.  Drop in hemoglobin noted.  Bowel sounds heard.  Diet per colorectal surgery.  Defer postoperative IV fluids/IV antibiotics and pain management to colorectal surgery.  Was on IV Zosyn 1/6 - 1/11 as recommended by colorectal surgery.    GI prophylaxis with IV famotidine 20 mg twice daily.  Appreciate colorectal surgery comanagement.    Acute symptomatic anemia likely dilutional/blood loss.  Status post 1 PRBC transfusion 1/10.  Hemoglobin dropped from 14> 12.0 > 8.4 >6.7.  Received 1 unit blood transfusion and hemoglobin 8.3.  Serum iron 56, iron saturation index 34.  Patient continues to complain of dizziness/does not feel well.   Last dark-colored bowel movement on 1/11, discussed with Dr. Haines and impression likely old blood.  Continue  monitoring hemoglobin levels every 12 hours.  Conditional order to transfuse if hemoglobin less than 8.  Discontinued subcu heparin given anemia requiring blood transfusion/ongoing bloody stools.     Atrial fibrillation with rapid ventricular response -likely due to surgical stress/volume overload  Has history of AFib and [facility].    PTA Pradaxa 150 mg twice daily for anticoagulation and  Cardizem 360 mg daily.    TSH 3.31.  Pradaxa was reversed with Praxbind pre-operatively.  Was on Cardizem drip from 1/6 to 1/9 and switch to oral Cardizem.  On 1/11 had RVR, restarted back Cardizem drip.  Continue IV Cardizem drip  Cardiology following along.  Appreciate comanagement.  Hold Pradaxa for now, will resume when okay with colorectal surgery.  Telemetry monitoring    Volume overload likely due to fluid resuscitation/postoperative.  Postoperative hypoxia -likely from volume overload/atelectasis.  Having 1+ pitting pedal edema, crackles bilateral lungs.  And requiring 3 L nasal oxygen.    Echocardiogram estimated EF at 55-60%, right ventricle mildly dilated, mild to moderate MR/TR.  Right ventricular systolic pressure elevated consistent with moderate pulmonary hypertension.  No regional wall motion abnormalities.  We will check proBNP, chest x-ray -for atelectasis/volume overload.    Strict input output  monitoring.  Daily weights.  We will hold off postoperative diuresis -deferred to surgery.    Aggressive incentive spirometry.  Wean off supplemental nasal oxygen as able to.    Benign essential hypertension.    Hold her prior to admission ramipril given borderline blood pressures.  Currently on IV Cardizem drip.     Mild cognitive impairment with possible mild acute delirium.    The patient does have history of mild cognitive impairment, and per the daughter, she does get delirious when she is in unfamiliar surroundings or when she is hospitalized.    Appears to be fairly lucid at the moment with  occasional forgetfulness  Delirium protocol -minimize interruptions at night if able to.  Neurocognitive assessment as outpatient.    Chronic kidney disease, stage II.   Hyponatremia  improved   Baseline creatinine between 0.9 and 1.1.   Currently creatinine of 0.70.  Avoid nephrotoxic drugs, monitor renal function periodically.    Obesity with a BMI of 33.19.  Consider lifestyle modification as able to.    Physical deconditioning from acute illness/senile fragility.  PT, OT assessment for transition of care.     Orders Placed This Encounter      NPO for Medical/Clinical Reasons Except for: Meds, Ice Chips      DVT Prophylaxis: SCD, ambulate.   Code Status: Full Code  Disposition: Expected discharge >2 days pending clinical improvement    Patient, interdisciplinary team involved in care and agrees with plan.  Total time > 25 min [discussed with  RN]. More than 50% of time spent in direct patient care, care coordination, patient counseling, and formalizing plan of care.     Sandeep Colindres MD        Interval History:      Patient lying in bed.  Continues to require 2 L-3 L nasal oxygen.  Minimal ambulation out of bed to commode.    Has not had any   bloody bowel movements overnight.  No nausea.  Patient states she is thirsty and would like to eat.  Currently on IV Cardizem drip for A. fib with RVR.  Intermittently confused, redirectable.           Physical Exam:        Physical Exam   Temp:  [98.1  F (36.7  C)-98.8  F (37.1  C)] 98.1  F (36.7  C)  Pulse:  [] 83  Heart Rate:  [] 90  Resp:  [16-18] 18  BP: (107-149)/() 116/54  SpO2:  [91 %-100 %] 98 %    Intake/Output Summary (Last 24 hours) at 1/9/2019 1235  Last data filed at 1/9/2019 1147  Gross per 24 hour   Intake 2468 ml   Output 1500 ml   Net 968 ml     Admission Weight: 78 kg (172 lb)  Current Weight: 82.3 kg (181 lb 7 oz)    PHYSICAL EXAM  GENERAL: Patient is in no distress. Alert and oriented x 2, forgetful   HEART: irregular rate and  rhythm. S1S2. Systolic murmur right sternal border   LUNGS: Bilateral decreased breath sounds, faint crackles lower lobe.  ABDOMEN: Soft, no abdominal tenderness, bowel sounds heard   Surgical site dressing intact.  Staples appear to be in place.  NEURO: Moving all extremities no focal weakness.  EXTREMITIES: 1 + pedal edema. 2+ peripheral pulses.  SKIN: Warm, dry   PSYCHIATRY Cooperative       Medications:          famotidine  20 mg Intravenous Q12H     sodium chloride 0.9%, acetaminophen, sore throat lozenge, diphenhydrAMINE, fentaNYL, HYDROmorphone, lactated ringers, ondansetron **OR** ondansetron, potassium chloride, potassium chloride with lidocaine, potassium chloride, potassium chloride, potassium chloride, prochlorperazine         Data:      All new lab and imaging data was reviewed.

## 2019-01-12 NOTE — PLAN OF CARE
[id number] Pt A/O x 4, forgetful, int confused. Pain covered with IV dil. NPO. Lungs diminished. Afib CVR on tele. Dilt drip in place. Moved to new room, all belongings with pt. Incontinent of bowel and bladder. Will continue to monitor.

## 2019-01-12 NOTE — PLAN OF CARE
VSS on RA.  Tele: Afib CVR.  Up with 1.  Dilt at 15.  Pt A&Ox4 but has intermittent disorganized thinking.  Purewick in place.  Abd incision WNL.  Full liquid diet.  No stool this shift.  IV dilaudid for pain management.  Cards signed off.  Plan to discharge to [facility] once able.  Hgb check q12h.  Call light within reach.  Will continue to monitor.

## 2019-01-12 NOTE — PLAN OF CARE
Discharge Planner OT   Patient plan for discharge: home  Current status: Pt seen in AM, requiring cues and encouragement to perform bed mob and did perform sup<>sit without assist, increased time and effort. Sat EOB for dressing task and requires constant cueing and Min-Mod A to do in safe manner. Pt on 2L 02 and with transfer and sit-stand, any effort, drops to mid 80's although quickly recovers >90% once seated or back in bed.   Barriers to return to prior living situation: requiring assist with all mobility and self-cares  Recommendations for discharge: TCU  Rationale for recommendations: pt significantly below baseline with mobilities and self-cares, lacks insight regarding her ability to manage safely at home alone, will benefit from daily therapies to advance ADLs, mobility, home-safety prior to return home.       Entered by: Bull Cruz 01/12/2019 10:05 AM

## 2019-01-12 NOTE — PROGRESS NOTES
SW:  D:  Call placed to update [facility] as to patient's status.  Per [facility], they can accept patient on Sunday or Monday.  P:  Will continue to follow.

## 2019-01-12 NOTE — PLAN OF CARE
"Pt A/O but forgetful. Rambles with some disorganized speech. VSS. Tele shows afib cvr/rvr, dilt at 15/hr Pt denies any CP or SOB. On 3L overnight, RA while awake. Trending hgb, latest at 8.8, orders to transfuse at or below 8. Incision CDI. Pain controlled with 0.5 dilaudid q2h. No stool overnight, skin breaking down on coccyx, barrier cream applied. Slept well. Pt has no new complaints.    /57   Pulse 100   Temp 98.7  F (37.1  C) (Axillary)   Resp 18   Ht 1.575 m (5' 2\")   Wt 78.8 kg (173 lb 11.6 oz)   SpO2 91%   BMI 31.77 kg/m        "

## 2019-01-12 NOTE — PROGRESS NOTES
"EP- Cardiology Progress Note           Assessment and Plan:     87 yo F with Hx of hypertension, hyperlipidemia and asthma, who was admitted on 1/6/2019 with abdominal pain/ischemic colitis (s/p right colectomy) hospital stay was completed by RAUDEL flower with RVR..    Echo confirmed EF 55-60%, mild/mod MR/TR    Plan:    1.  Chronic atrial fibrillation w/ episode of RVR. TIP-fib is well controlled with Diltiazem gtt 15 mg/hr. Anticoagulation on hold, frequent due to blood stools this am.     Change Dilt to oral (360 mg daily), once pt is able to take oral (per Colorectal surgery)  2. Hypertension.  BP is well controlled.   3. Ischemic Colitis  - s/p Right colectomy  - Followed by surgery     We will sign off.  Please call with questions.      Physical Exam:  Vitals: /54 (BP Location: Right arm)   Pulse 83   Temp 98.1  F (36.7  C) (Oral)   Resp 18   Ht 1.575 m (5' 2\")   Wt 78.6 kg (173 lb 3.2 oz)   SpO2 98%   BMI 31.68 kg/m        Intake/Output Summary (Last 24 hours) at 1/12/2019 0942  Last data filed at 1/11/2019 1912  Gross per 24 hour   Intake 428 ml   Output --   Net 428 ml     Vitals:    01/08/19 0604 01/09/19 0638 01/10/19 0615 01/11/19 0430   Weight: 83.5 kg (184 lb 1.4 oz) 82.3 kg (181 lb 7 oz) 84.1 kg (185 lb 6.5 oz) 84.2 kg (185 lb 10 oz)    01/12/19 0614   Weight: 78.6 kg (173 lb 3.2 oz)       Constitutional:  AAO x3.  Pt is in NAD.  HEAD: Normocephalic.  SKIN: Skin normal color, texture and turgor with no lesions or eruptions.  Eyes: PERRL, EOMI.  ENT:  Supple, normal JVP. No lymphadenopathy or thyroid enlargement.  Chest:  CTAB, Decrease breath sound in base B/L. Rales,  Wheezing.  Cardiac:  IIR, variable sound of S1 and Normal S2.  RRR, normal  S1 and S2.  No murmurs rubs or gallop.  Systolic murmur in LLSB II/VI.  Abdomen:  Normal BS.  Soft, non-tender and non-distended.  No rebound or guarding.    Extremities:  Pedious pulses palpable B/L.  No LE edema noticed.   Neurological: Strength and " sensation grossly symmetric and intact throughout.                            Review of Systems:   As per subjective, otherwise 5 systems reviewed and negative.         Medications:          famotidine  20 mg Intravenous Q12H     PRN Meds: sodium chloride 0.9%, acetaminophen, sore throat lozenge, diphenhydrAMINE, fentaNYL, HYDROmorphone, lactated ringers, ondansetron **OR** ondansetron, potassium chloride, potassium chloride with lidocaine, potassium chloride, potassium chloride, potassium chloride, prochlorperazine             Data:     Recent Labs   Lab 01/12/19  0543 01/12/19  0130 01/11/19  1846  01/11/19  0550  01/10/19  0532  01/06/19  1628 01/06/19  1000   WBC 9.2  --   --   --  9.9  --  7.8   < >  --  32.4*   HGB 8.3* 8.8* 9.0*   < > 9.0*   < > 6.7*   < >  --  14.1   MCV 91  --   --   --  90  --  92   < >  --  88     --   --   --  245  --  204   < >  --  272   INR  --   --   --   --   --   --   --   --  1.54* 2.72*     --   --   --  135  --  136   < > 130* 127*   POTASSIUM 4.2  --   --   --  4.1  --  4.2   < > 4.3 4.2   CHLORIDE 102  --   --   --  100  --  103   < > 97 91*   CO2 28  --   --   --  27  --  31   < > 25 26   BUN 17  --   --   --  20  --  20   < > 21 24   CR 0.70  --   --   --  0.67  --  0.81   < > 0.99 1.16*   ANIONGAP 7  --   --   --  8  --  2*   < > 8 10   MARK 7.6*  --   --   --  7.8*  --  7.3*   < > 7.8* 9.1   GLC 74  --   --   --  95  --  71   < > 108* 118*   ALBUMIN 2.2*  --   --   --   --   --  2.0*  --   --  3.2*   PROTTOTAL 5.1*  --   --   --   --   --  4.7*  --   --  7.3   BILITOTAL 0.4  --   --   --   --   --  0.5  --   --  0.8   ALKPHOS 57  --   --   --   --   --  47  --   --  68   ALT 37  --   --   --   --   --  25  --   --  21   AST 30  --   --   --   --   --  24  --   --  17   LIPASE  --   --   --   --   --   --   --   --   --  63*    < > = values in this interval not displayed.

## 2019-01-12 NOTE — PROVIDER NOTIFICATION
Paged Dr. Colindres saying that pt is wondering if she can have clear liquids.  Talked with Dr. Miller and will wait for colorectal docs to see pt and advance diet per their opinion

## 2019-01-13 NOTE — PROGRESS NOTES
COLON & RECTAL SURGERY  PROGRESS NOTE    January 11, 2019  Post-op Day # 7 ex lap, right colectomy for ischemic colitis    SUBJECTIVE:  Pain well controlled. No nausea. Tolerated full liquids. Hgb stable and no further melena. Loose stools. Confused on rounds.     OBJECTIVE:  Temp:  [97.8  F (36.6  C)-98.3  F (36.8  C)] 98.2  F (36.8  C)  Pulse:  [] 113  Heart Rate:  [109] 109  Resp:  [18] 18  BP: ()/() 115/57  SpO2:  [95 %-99 %] 99 %    Intake/Output Summary (Last 24 hours) at 1/11/2019 0800  Last data filed at 1/10/2019 1738  Gross per 24 hour   Intake 1250 ml   Output --   Net 1250 ml       GENERAL:  Awake, alert, no acute distress, lying in bed  HEAD: Nomocephalic atraumatic  SCLERA: anicteric  EXTREMITIES: warm and well perfused  ABDOMEN:  Soft, mild tenderness, some distension, no rebound or guarding, no peritoneal signs.   INCISION:  C/d/i, staples without erythema    LABS:  Lab Results   Component Value Date    WBC 9.9 01/11/2019     Lab Results   Component Value Date    HGB 9.0 01/11/2019     Lab Results   Component Value Date    HCT 26.2 01/11/2019     Lab Results   Component Value Date     01/11/2019     Last Basic Metabolic Panel:  Lab Results   Component Value Date     01/11/2019      Lab Results   Component Value Date    POTASSIUM 4.1 01/11/2019     Lab Results   Component Value Date    CHLORIDE 100 01/11/2019     Lab Results   Component Value Date    MARK 7.8 01/11/2019     Lab Results   Component Value Date    CO2 27 01/11/2019     Lab Results   Component Value Date    BUN 20 01/11/2019     Lab Results   Component Value Date    CR 0.67 01/11/2019     Lab Results   Component Value Date    GLC 95 01/11/2019       ASSESSMENT/PLAN:  POD 7 ex lap, right colectomy for ischemic colitis.    Hemoglobin stable. Bloody stools have resolved. Would continue to check daily Hgb.  Low fiber diet today with supplements.   Antibiotics completed.   Ok to resume DVT chemoprophylaxis. Would  hold full anticoagulation for now.   PT/OT.  Appreciate Hospitalist assistance in the care of this patient.    Please call with further questions or concerns.     Mercedes Land MD  [facility]  Phone: [phone]

## 2019-01-13 NOTE — PLAN OF CARE
VSS on RA.  Tele: Afib CVR with BBB.  A&O but intermittent disorganized thinking.  Up with 1-2.  Pt refused to get up in chair or walk in [place].  Encouraged repositioning in bed.  Purewick removed and pt encouraged to ambulate to bathroom.  Pt can be incontinent of B&B.  Tolerating low fiber diet.  PRN dilaudid for pain.  Abd incision WNL.  Switched to PO dilt.  IV dilt restarted at 5 per discussion with Dr. Colindres.  Call light within reach.  Will continue to monitor.

## 2019-01-13 NOTE — PROGRESS NOTES
"CLINICAL NUTRITION SERVICES  -  ASSESSMENT NOTE      Recommendations Ordered by Registered Dietitian (RD):   Boost Plus BID btw meals (Vanilla-Strawberry)   Malnutrition:   % Weight Loss:  Weight loss does not meet criteria for malnutrition - appears to be intentional since admit  % Intake:  <75% for > 7 days (non-severe malnutrition)  Subcutaneous Fat Loss:  None observed  Muscle Loss:  None observed  Fluid Retention:  Mild - Could be partly nutritional    Malnutrition Diagnosis: Non-Severe malnutrition  In Context of:  Acute illness or injury        REASON FOR ASSESSMENT  Tita Escobar is a 86 year old female seen by Registered Dietitian for LOS      NUTRITION HISTORY  - Unable to obtain nutrition history as pt confused and no family available.  She did tell me that all of her meals are provided at the facility she resides.  No food allergies/intolerances.      CURRENT NUTRITION ORDERS  Diet Order:     Low Fiber   Boost Plus with meals    Current Intake/Tolerance:  Pt was combination NPO/Liquids x 6 days up until today when diet was advanced to Low Fiber.  Pt told me she was feeling very nauseated this pm.    She stated the only things she's had so far today is apple juice and tomato soup.  Per nursing flow sheet, pt consuming % meals served.  She took 900 mL oral yesterday and 600 mL so far today.  She was mostly fixated on certain sugar free cough drops that she needs to take in order to sleep.    1/6:  Surgery - Exp lap, right colectomy      PHYSICAL FINDINGS  Observed  Alopecia  Obtained from Chart/Interdisciplinary Team  Edema - 1+ pitting    ANTHROPOMETRICS  Height: 5' 2\"  Admit Weight:  81.1 kg  Current Wt:  78.6 kg (down 2.5 kg since admit) - Pt had mild volume overload post op  Body mass index is 31.68 kg/m .  Weight Status:  Obesity Grade I BMI 30-34.9  IBW:  50 kg  % IBW:  157%  Weight History: Weight up 6 kg over the past 6 months    Wt Readings from Last 20 Encounters:   01/12/19 78.6 kg (173 " lb 3.2 oz)   07/17/18 72.6 kg (160 lb)   04/19/17 79.7 kg (175 lb 9.6 oz)   04/19/16 81.2 kg (179 lb)   12/11/15 79.8 kg (176 lb)   10/01/15 78.9 kg (174 lb)   08/26/15 81.8 kg (180 lb 4.8 oz)   05/20/15 76.7 kg (169 lb)   04/10/15 76.2 kg (168 lb)   03/12/15 75.8 kg (167 lb)   02/11/15 74.4 kg (164 lb)   08/15/14 74.8 kg (165 lb)       LABS  Labs reviewed  BUN 13 (NL)  Cr 0.62 (NL) - Pt with CKD-3    MEDICATIONS  Medications reviewed      ASSESSED NUTRITION NEEDS PER APPROVED PRACTICE GUIDELINES:    Dosing Weight: 57.2 kg   Estimated Energy Needs:  8655-7270 kcals (25-30 Kcal/Kg)  Justification: overweight  Estimated Protein Needs:  57-74 grams protein (1-1.3 g pro/Kg)  Justification: post-op, CKD and preservation of lean body mass  Estimated Fluid Needs:  2955-3857 mL (1 mL/Kcal)  Justification: maintenance    MALNUTRITION:  % Weight Loss:  Weight loss does not meet criteria for malnutrition - appears to be intentional since admit  % Intake:  <75% for > 7 days (non-severe malnutrition)  Subcutaneous Fat Loss:  None observed  Muscle Loss:  None observed  Fluid Retention:  Mild - Could be partly nutritional    Malnutrition Diagnosis: Non-Severe malnutrition  In Context of:  Acute illness or injury    NUTRITION DIAGNOSIS:  Inadequate oral intake related to altered GI function (ischemic colitis, s/p surgery) and post op decreased appetite as evidenced by decreased oral intake since admission x 7 days.      NUTRITION INTERVENTIONS  Recommendations / Nutrition Prescription  Continue Low fiber diet + Boost Plus BID btw meals (Vanilla-Strawberry)    Implementation  Nutrition education: Not appropriate at this time due to patient condition  Medical Food Supplement - Note the Boost was added btw meals (vs with meals) which sets pt up for frequent feedings.  Will keep them btw meals for now.      Nutrition Goals  Pt will consume > 75 meals and > 50% supplements in 3-5 days.      MONITORING AND EVALUATION:  Progress towards  goals will be monitored and evaluated per protocol and Practice Guidelines    Yamile Moore, ROBERTA, LD, CNS

## 2019-01-13 NOTE — PROVIDER NOTIFICATION
Paged Dr. Colindres: Ivs keep going bad, would you like midline or PICC for blood? Received orders for midline.    Paged again with following: Last type and screen 1/10, do we need a new one?

## 2019-01-13 NOTE — PLAN OF CARE
"Pt A/O but forgetful. VSS. Up with ax1. Tele shows afib cvr/rvr. Pt denies any CP or SOB. Reports generalized pain, dilaudid given for. Concern with IV. Skin area looks like infiltration, but no infusion running my shift. Area outlined. BM x2. Purewick in place. Dilt at 15 mg/hr with HR in 90s-110s. Pt resting comfortably.    /73   Pulse 102   Temp 98.3  F (36.8  C)   Resp 18   Ht 1.575 m (5' 2\")   Wt 78.6 kg (173 lb 3.2 oz)   SpO2 95%   BMI 31.68 kg/m        "

## 2019-01-13 NOTE — PROGRESS NOTES
SW:  D:  Call placed to update [facility] as to patient's status.  They can accept patient tomorrow.  P:  Will continue to follow.

## 2019-01-13 NOTE — PROGRESS NOTES
[facility]  Hospitalist Progress Note   01/13/2019          Assessment and Plan:       Ms. Tita Escobar is an 86-year-old female with a history of atrial fibrillation on Pradaxa, hypertension, previous CVA, and mild cognitive impairment admitted on 1/6/2019 with abdominal pain.     Ischemic colitis with necrotic cecum status post exploratory laparotomy and right colectomy.    Presented with abdominal pain for several days, clinically dehydrated with WBC of 32,000, elevated lactate.  CT abdomen concerning for ischemic colitis with prominent right colon bowel wall thickening with mesenteric edema or inflammation and adjacent free fluid with suspicion for pneumatosis.    Underwent emergent exploratory laparotomy and right colectomy on 1/7 by colorectal surgery. Estimated blood loss 50 mL.  Intraoperatively found to have bloody purulent fluid without any anel perforation.  Peritoneal fluid cultures negative.  Peritoneal specimen showed many WBCs, polymorphonuclear leukocytes, many RBC. Nasal swab for MRSA negative. Blood cultures negative.  Last bloody bowel movement 1/11, abdominal pain improving  Was on IV Zosyn 1/6 - 1/11 as recommended by colorectal surgery.    GI prophylaxis with IV famotidine 20 mg twice daily.   Defer postoperative IV fluids/diet and pain management per colorectal surgery.  Appreciate colorectal surgery comanagement.    Acute symptomatic anemia likely dilutional/blood loss.  Status post 1 PRBC transfusion 1/10.  Hemoglobin dropped from 14> 12.0 > 8.4 >6.7.  Received 1 unit blood transfusion and hemoglobin 8.0.  Serum iron 56, iron saturation index 34.  Last dark-colored bowel movement on 1/11, discussed with Dr. Kathi chandra and impression likely old blood.  Continue monitoring hemoglobin levels every 12 hours.  Conditional order to transfuse if hemoglobin less than 8.  Discontinued subcu heparin given anemia requiring blood transfusion/ bloody stools.     Atrial fibrillation  with rapid ventricular response -likely due to surgical stress/volume overload  Has history of AFib and sees Dr. Augustine of [facility].  TSH 3.31.  PTA Pradaxa 150 mg twice daily for anticoagulation and Cardizem 360 mg daily.    Pradaxa was reversed with Praxbind pre-operatively.  Was on Cardizem drip from 1/6 to 1/9 and switch to oral Cardizem.  Currently on Cardizem drip from 1/11 for RVR.  Tolerating clear liquid diet hence will restart PTA extended release 360 mg oral Cardizem and taper off Cardizem drip.  Cardiology signed off 1/12.  Appreciate comanagement.  Hold Pradaxa for now, will resume when okay with colorectal surgery.  Telemetry monitoring    Mild volume overload likely due to fluid resuscitation/postoperative.  Status post postoperative hypoxia -likely from volume overload/atelectasis.  ProBNP 2456, chest x-ray no acute pathology.  Echocardiogram estimated EF at 55-60%, right ventricle mildly dilated, mild to moderate MR/TR.  Right ventricular systolic pressure elevated consistent with moderate pulmonary hypertension.  No regional wall motion abnormalities.  Having 1+ pitting pedal edema, no crackles.  Weaned off nasal oxygen.   Strict input output  monitoring.  Daily weights.  We will hold off postoperative diuresis -deferred to surgery.    Aggressive incentive spirometry and ambulate.    Benign essential hypertension.    Hold her prior to admission ramipril given borderline blood pressures.  Currently on IV Cardizem drip and plan to start oral Cardizem today.     Mild cognitive impairment with possible mild acute delirium.    The patient does have history of mild cognitive impairment, and per the daughter, she does get delirious when she is in unfamiliar surroundings or when she is hospitalized.    Appears to be fairly lucid at the moment with occasional forgetfulness  Delirium protocol -minimize interruptions at night if able to.  Neurocognitive assessment as outpatient.    Chronic kidney disease, stage  II.   Hyponatremia  improved   Baseline creatinine between 0.9 and 1.1.   Currently creatinine of 0.62.  Avoid nephrotoxic drugs, monitor renal function periodically.    Obesity with a BMI of 31.68.  Consider lifestyle modification as able to.    Physical deconditioning from acute illness/senile fragility.  PT, OT assessment for transition of care.     Orders Placed This Encounter      Full Liquid Diet      DVT Prophylaxis: SCD, ambulate.   Code Status: Full Code  Disposition: Expected discharge 2 days pending clinical improvement    Patient, interdisciplinary team involved in care and agrees with plan.  Total time > 25 min [discussed with  RN]. More than 50% of time spent in direct patient care, care coordination, patient counseling, and formalizing plan of care.     Sandeep Colindres MD        Interval History:      Patient lying in bed.  Weaned off supplemental nasal oxygen.  Minimal ambulation out of bed to commode.    Tolerating clear liquid diet.  Nominal pain improving.  Has not had any  bloody bowel movements overnight.  No nausea or vomiting.  Currently on IV Cardizem drip for A. fib with RVR.  Intermittently confused, redirectable.           Physical Exam:        Physical Exam   Temp:  [97.5  F (36.4  C)-98.3  F (36.8  C)] 98.2  F (36.8  C)  Pulse:  [] 89  Heart Rate:  [] 109  Resp:  [18] 18  BP: ()/(51-92) 127/52  SpO2:  [94 %-99 %] 99 %    Intake/Output Summary (Last 24 hours) at 1/9/2019 1235  Last data filed at 1/9/2019 1147  Gross per 24 hour   Intake 2468 ml   Output 1500 ml   Net 968 ml     Admission Weight: 78 kg (172 lb)  Current Weight: 82.3 kg (181 lb 7 oz)    PHYSICAL EXAM  GENERAL: Patient is in no distress. Alert and oriented x 2, forgetful   HEART: irregular rate and rhythm. S1S2. Systolic murmur right sternal border   LUNGS: Bilateral decreased breath sounds, no crackles or wheezing.  ABDOMEN: Soft, no abdominal tenderness, bowel sounds heard   Surgical site dressing  intact.  Staples appear to be in place.  NEURO: Moving all extremities no focal weakness.  EXTREMITIES: 1 + pedal edema. 2+ peripheral pulses.  SKIN: Warm, dry   PSYCHIATRY Cooperative       Medications:          famotidine  20 mg Intravenous Q12H     sodium chloride (PF)  10 mL Intracatheter Q8H     sodium chloride 0.9%, acetaminophen, sore throat lozenge, diphenhydrAMINE, fentaNYL, HYDROmorphone, lactated ringers, ondansetron **OR** ondansetron, potassium chloride, potassium chloride with lidocaine, potassium chloride, potassium chloride, potassium chloride, prochlorperazine, sodium chloride (PF)         Data:      All new lab and imaging data was reviewed.

## 2019-01-14 NOTE — CONSULTS
I have seen and examined the patient.  I have also discussed this case extensively with the patient's daughter, patient son-in-law and Dr. Armenta.  I have also reviewed all the imaging and the operative report.    This is an 86-year-old female with past medical history of chronic atrial fibrillation, cerebrovascular accident, hypertension, hyperlipidemia, mild early cognitive impairment and stage III kidney disease who presented on 6 of this month with abdominal pain.    She was taken to the operating room on the seventh of this month and underwent a right colectomy.  Pathology from that OR showed colonic wall and terminal ileum with ischemic changes with mucosal ischemic changes present at the proximal margin of the resection.  Since that time  Patient has not shown full recovery and has had persistent distention and abdominal pain.  Recently the pain has gotten worse and has been accompanied by significant abdominal distention.    On my examination patient appears to be in moderate distress in spite of having a nasogastric tube placed which emptied more than 1600 mL of feculent fluid.  He has generalized abdominal tenderness.  I personally reviewed the CT scan of the abdomen and pelvis that was performed today.  There is pneumatosis of the small intestine with air in the superior mesenteric vein as well as in the liver.  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    I am afraid this is a very complicated situation where given the clinical examination and finding the CT scan there is more necrotic small intestine in the peritoneal cavity.  The options that I presented with the family:  1.  Exploratory laparotomy with small bowel resection with either superior mesenteric artery bypass of superior mesenteric artery stenting.  2.  Comfort measures only.    Patient's daughter and  have opted for comfort measures only.  I believe this is a more humane approach as quite frankly  I do not see any quality of life if there is any further surgical intervention undertaken.  I appreciate this consult.

## 2019-01-14 NOTE — PROGRESS NOTES
Pt transitioned to comfort cares today.  NG tube placed for decompression, 2700 in brown output.  Patterson patent 550.  On 5L NC.  Getting dilaudid for pain/discomfort, ordered q2hrs.  Family at bedside.  Transferred to .

## 2019-01-14 NOTE — PLAN OF CARE
"Pt A/O. VSS. Up with ax1. Tele shows afib cvr/rvr, had to restart dilt gtt overnight, HR up to 150s. . Pt denies any CP or SOB. Has needed O2 overnight, sats drop, unsure of accuracy d/t cold fingers, ear also tried with same results. Still reporting full body pain, states dilaudid does nothing. Nausea present but improved overnight. Needs motivation to move.    /82   Pulse 125   Temp 98.5  F (36.9  C) (Axillary)   Resp 14   Ht 1.575 m (5' 2\")   Wt 78.6 kg (173 lb 3.2 oz)   SpO2 97%   BMI 31.68 kg/m          "

## 2019-01-14 NOTE — CONSULTS
Given patient progression to comfort care in light of ongoing bowel ischemia, I will elect to not see the patient.

## 2019-01-14 NOTE — PROGRESS NOTES
[facility]  Hospitalist Progress Note   01/14/2019          Assessment and Plan:       Ms. Tita Escobar is an 86-year-old female with a history of atrial fibrillation on Pradaxa, hypertension, previous CVA, and mild cognitive impairment admitted on 1/6/2019 with abdominal pain.     Ischemic colitis with necrotic cecum status post exploratory laparotomy and right colectomy.    Presented with abdominal pain for several days, clinically dehydrated with WBC of 32,000, elevated lactate.  CT abdomen concerning for ischemic colitis with prominent right colon bowel wall thickening with mesenteric edema or inflammation and adjacent free fluid with suspicion for pneumatosis.    Underwent emergent exploratory laparotomy and right colectomy on 1/7 by colorectal surgery. Estimated blood loss 50 mL.  Intraoperatively found to have bloody purulent fluid without any anel perforation.  Peritoneal fluid cultures negative.  Peritoneal specimen showed many WBCs, polymorphonuclear leukocytes, many RBC. Nasal swab for MRSA negative. Blood cultures negative.  Last bloody bowel movement 1/11, abdominal pain improving  Was on IV Zosyn 1/6 - 1/11 as recommended by colorectal surgery.    GI prophylaxis with IV famotidine 20 mg twice daily.   White count up to 15,800 without fever.  Patient's nausea and pain are somewhat improved today.  Plan recheck CT imaging of the abdomen.  Appreciate colorectal surgery comanagement.  CT scan of the abdomen and pelvis that was performed today.  There is pneumatosis of the small intestine with air in the superior mesenteric vein as well as in the liver.  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  Addendum:vascular surgery consult: notes very complicated situation where given the clinical examination and finding the CT scan there is more necrotic small intestine in the peritoneal cavity.  The options presented with the family:  1.  Exploratory laparotomy  with small bowel resection with either superior mesenteric artery bypass of superior mesenteric artery stenting.  2.  Comfort measures only.   Patient's daughter and  have opted for comfort measures only.  Continue NG suctioning for comfort measures as patient had a markedly distended stomach.  Treat with intravenous Dilaudid as well as as needed Lorazepam.  Prognosis less than 24 hours.  Can transition to sublingual medications tomorrow if patient plateaus.         Acute symptomatic anemia likely dilutional/blood loss.  Status post 1 PRBC transfusion 1/10.  Hemoglobin dropped from 14> 12.0 > 8.4 >6.7.  Received 1 unit blood transfusion and hemoglobin 9.1 Monday.    Serum iron 56, iron saturation index 34.  Last dark-colored bowel movement on 1/11, discussed with Dr. Kathi chandra and impression likely old blood.  Continue monitoring hemoglobin levels every 12 hours.  Conditional order to transfuse if hemoglobin less than 8.  Discontinued subcu heparin given anemia requiring blood transfusion/ bloody stools.  Consider restarting subcu heparin Tuesday     Atrial fibrillation with rapid ventricular response -likely due to surgical stress/volume overload  Has history of AFib and sees Dr. Augustine of [facility].  TSH 3.31.  PTA Pradaxa 150 mg twice daily for anticoagulation and Cardizem 360 mg daily.    Pradaxa was reversed with Praxbind pre-operatively.  Was on Cardizem drip from 1/6 to 1/9 and switch to oral Cardizem.  Currently on Cardizem drip from 1/11 for RVR.  Tolerating clear liquid diet hence will restart PTA extended release 360 mg oral Cardizem and taper off Cardizem drip.  Cardiology signed off 1/12.  Appreciate comanagement.  Hold Pradaxa for now, will resume when okay with colorectal surgery.  Patient had recurrent atrial fibrillation with rapid ventricular response on the baseline Cardizem 360 mg.  We will add low-dose metoprolol.  Monitor carefully as patient had history of slow ventricular response on  this combination in the past.    Mild volume overload likely due to fluid resuscitation/postoperative.  Status post postoperative hypoxia -likely from volume overload/atelectasis.  ProBNP 2456, chest x-ray no acute pathology.  Echocardiogram estimated EF at 55-60%, right ventricle mildly dilated, mild to moderate MR/TR.  Right ventricular systolic pressure elevated consistent with moderate pulmonary hypertension.  No regional wall motion abnormalities.  Having 1+ pitting pedal edema, no crackles.  Weaned off nasal oxygen.   Weight stable, O2 sats normal and edema probably just third spacing from low albumin.    Benign essential hypertension.    Hold her prior to admission ramipril given borderline blood pressures.  Currently on Cardizem    Mild cognitive impairment with possible mild acute delirium.    The patient does have history of mild cognitive impairment, and per the daughter, she does get delirious when she is in unfamiliar surroundings or when she is hospitalized.    Appears to be fairly lucid at the moment with occasional forgetfulness  Delirium protocol -minimize interruptions at night if able to.  Neurocognitive assessment as outpatient.    Chronic kidney disease, stage II.   Hyponatremia  improved   Baseline creatinine between 0.9 and 1.1.     Obesity with a BMI of 31.68.  Consider lifestyle modification as able to.    Physical deconditioning from acute illness/senile fragility.  PT, OT assessment for transition of care.  Patient has been too fatigued or too much abdominal discomfort to ambulate as of yet, continue with therapies.     Orders Placed This Encounter      Low Fiber Diet      DVT Prophylaxis: SCD, ambulate.   Code Status: Full Code  Disposition: Expected discharge 2 days pending clinical improvement    Patient, interdisciplinary team involved in care and agrees with plan.  Total time > 25 min [discussed with  RN]. More than 50% of time spent in direct patient care, care coordination, patient  counseling, and formalizing plan of care.     Alberto Macdonald MD, MD        Interval History:      Patient lying in bed.  Weaned off supplemental nasal oxygen.  Minimal ambulation out of bed to commode.    Tolerating clear liquid diet.   pain improving has intermittent nausea.  Has not had any  bloody bowel movements overnight.  Currently on IV Cardizem drip for A. fib with RVR.  Complained of a lot of cough over the weekend, doing better today.  Denies any shortness of breath, chest pain.  States she is willing to walk but is been too tired and needs her rest.           Physical Exam:        Physical Exam   Temp:  [98.3  F (36.8  C)-98.5  F (36.9  C)] 98.5  F (36.9  C)  Pulse:  [] 106  Heart Rate:  [110] 110  Resp:  [14-18] 14  BP: ()/() 156/83  SpO2:  [91 %-98 %] 98 %    Intake/Output Summary (Last 24 hours) at 1/9/2019 1235  Last data filed at 1/9/2019 1147  Gross per 24 hour   Intake 2468 ml   Output 1500 ml   Net 968 ml     Admission Weight: 78 kg (172 lb)  Current Weight: 82.3 kg (181 lb 7 oz)    PHYSICAL EXAM  GENERAL: Patient is in no distress. Alert and oriented x 2, forgetful   HEART: irregular rate and rhythm. S1S2. 2-3/6 Systolic murmur right sternal border   LUNGS: Bilateral decreased breath sounds, no crackles or wheezing.  ABDOMEN: Soft, mild abdominal tenderness, bowel sounds heard   Surgical site dressing intact.  Staples appear to be in place.  NEURO: Moving all extremities no focal weakness.  EXTREMITIES: Trace pedal edema.  SKIN: Warm, dry   PSYCHIATRY Cooperative       Medications:          diltiazem ER  360 mg Oral Daily     furosemide  20 mg Intravenous Once     metoprolol tartrate  25 mg Oral BID     ranitidine  150 mg Oral BID     sodium chloride (PF)  10 mL Intracatheter Q8H     sodium chloride 0.9%, acetaminophen, sore throat lozenge, diphenhydrAMINE, fentaNYL, HYDROmorphone, lactated ringers, metoclopramide, ondansetron **OR** ondansetron, potassium chloride, potassium  chloride with lidocaine, potassium chloride, potassium chloride, potassium chloride, prochlorperazine, sodium chloride (PF)         Data:      All new lab and imaging data was reviewed.

## 2019-01-14 NOTE — PROGRESS NOTES
Picc line on hold for now per MD. Pt. has a midline that is in the right upper arm that is patent.

## 2019-01-14 NOTE — PROGRESS NOTES
COLON [facility]  PROGRESS NOTE    January 14, 2019  Post-op Day # 8 ex lap, right colectomy    SUBJECTIVE:  Continues with a-fib.  Diltiazem gtt was resumed overnight.  Having nausea without much relief with zofran, compazine.  Required 2-4L O2 overnight.  Per patient, she slept well but also reporting worse abdominal pain yesterday to today.    OBJECTIVE:  Temp:  [98.3  F (36.8  C)-98.5  F (36.9  C)] 98.5  F (36.9  C)  Pulse:  [] 106  Heart Rate:  [110] 110  Resp:  [14-18] 14  BP: ()/() 156/83  SpO2:  [91 %-98 %] 98 %    Intake/Output Summary (Last 24 hours) at 1/14/2019 0818  Last data filed at 1/14/2019 0700  Gross per 24 hour   Intake 400 ml   Output 1350 ml   Net -950 ml       GENERAL:  Awake, alert, no acute distress, resting in bed  HEAD: Normocephalic atraumatic  SCLERA: Anicteric  EXTREMITIES: Warm and well perfused  ABDOMEN:  Soft, appropriately tender, non-distended. No guarding, rigidity, or peritoneal signs.   INCISION:  C/d/i, staples in midline wound, wound without erythema or drainage    LABS:  Lab Results   Component Value Date    WBC 15.8 01/14/2019     Lab Results   Component Value Date    HGB 9.1 01/14/2019     Lab Results   Component Value Date    HCT 26.5 01/14/2019     Lab Results   Component Value Date     01/14/2019     Last Basic Metabolic Panel:  Lab Results   Component Value Date     01/14/2019      Lab Results   Component Value Date    POTASSIUM 4.2 01/14/2019     Lab Results   Component Value Date    CHLORIDE 98 01/14/2019     Lab Results   Component Value Date    MARK 8.2 01/14/2019     Lab Results   Component Value Date    CO2 35 01/14/2019     Lab Results   Component Value Date    BUN 14 01/14/2019     Lab Results   Component Value Date    CR 0.64 01/14/2019     Lab Results   Component Value Date     01/14/2019       ASSESSMENT/PLAN: POD 8 ex lap, right colectomy for ischemic colitis.  Requiring O2 support overnight via nasal cannula with  2-4L O2.  Having nausea on low fiber diet.  WBC from 10->15.8 today.    1. Low fiber diet  2. PRN pain meds.  3. PT/OT, encourage pt to be up and OOB  4. Daily Hgb checks, monitor for bloody BMs  5. Appreciate hospitalist assistance  6. Discussed with Dr Armenta and will get CT a/p today given increased WBC, increased O2 requirements, and worse abd pain      For questions/paging, please contact the CRS office at [phone].    Patsy Leone PA-C  [facility]  Phone: [phone]      Addendum 11:35 AM: Spoke with Dr. Kay of radiology re: critical results including portal venous gas, ischemic bowel.  Have made pt NPO and made Dr. Armenta aware of results.  Per Dr. Armenta, will place NGT and resume zosyn.    Patsy Leone PA-C  [facility]  [phone]    CRS Staff  Noted to have more pain overnight.  Passing gas.  Noted to be bloated.  CT obtained.  Shows significant ileus with jejunal pneumotosis and portal venous air.  Will restart antibiotics, place NG, and give fluid bolus with albumin.  CT notes calcification of SMA and celiac access.  Would recommend vascular surgery consult.  OK from my perspective to anticoagulate if felt appropriate.  Page place to Dr. Agudelo from hospitalist service.  RN for shift updated.    Horacio Armenta MD  Colorectal Surgery  [phone] (office)  [phone] (pager)  [email or web address]          Addendum (for CRS office use):   ADDENDUM:  Length of stay: 10 days ( POD#10 on 19)  Indicate Y or N for the following:  UTI  No  C diff  No  PNA  No  SSI No  DVT No  PE  No  CVA No  MI No  Enterocutaneous fistula  No  Peripheral nerve injury  No  Abscess (not adjacent to anastomosis)  No  Leak No    Treated with:   Antibiotics N/A   Drain  N/A   Reoperation  N/A  Death within 30 days Yes  Reintubation  No  Reoperation  No   Procedure N/A    FOR CANCER CASES: N/A

## 2019-01-14 NOTE — PLAN OF CARE
Midline placed today.  Pt up with 2.  Pt A&Ox4 but can have disorganized thinking.  Sat in chair today after a lot of encouragement.  Pt started on PO dilt.  IV dilt stopped twice for low BP but HR rises into 120s-130s once off for some time.  Pt complaining of abdominal pain, PRN dilaudid given.  Pt advanced to low fiber diet, not tolerating well.  No loose stools.  Pt complaining of nausea, Zofran and compazine given without much relief.  Hgb being monitored.  Call light within reach.  Daughter updated today in person.

## 2019-01-14 NOTE — PLAN OF CARE
Physical Therapy Discharge Summary    Reason for therapy discharge:    Change in medical status.    Progress towards therapy goal(s). See goals on Care Plan in [facility] electronic health record for goal details.  Goals not met.  Barriers to achieving goals:   Pt transitioning to comfort cares.    Therapy recommendation(s):    No further therapy is recommended.

## 2019-01-14 NOTE — PLAN OF CARE
OT: Attempted OT, patient sleeping and family requested to let her sleep and notified OT that they are just trying to keep her comfortable and will not be pursuing therapy. OT to sign off as patient to transition to comfort cares per chart, with family expressing wish to stop therapies/rehab at this time.    Occupational Therapy Discharge Summary    Reason for therapy discharge:    Change in medical status.   Comfort Cares with family request to hold therapy.    Progress towards therapy goal(s). See goals on Care Plan in [facility] electronic health record for goal details.  Goals not met.  Barriers to achieving goals:   Patient with change in medical condition and will be transitioning to comfort cares..    Therapy recommendation(s):    No further therapy is recommended.

## 2019-01-15 NOTE — PLAN OF CARE
Comfort cares. Vitals deferred. Using O2 for comfort. Lethargic, arouses to voice. Family at bedside. Turn/repo per comfort. IV dilaudid given Q2H as scheduled for comfort. Aromatherapy provided. PRN IV ativan given x1 for restlessness, effective. NPO. C/o dry mouth, biotene spray applied and frequent oral cares provided. NG tube connected to low intermittent suction, 500 ml brown output. R midline SL with good BR. Patterson patent with adequate clear yellow output. Blanchable redness on coccyx. Resting comfortably between cares. Will continue providing support.

## 2019-01-15 NOTE — PLAN OF CARE
Patient is a comfort care patient, arouses to gentle touch and voice at times. Pain managed with scheduled dilaudid, ativan available for agitation Q4, given x1 and effective. Respirations 20, snoring, on O2 for comfort. Patterson catheter with minimal output, NG tube connected to low intermittent suction, with minimal output. Turning and repositioning offered Q2, family refuses at times. R midline SL. Discharge pending.

## 2019-01-15 NOTE — CONSULTS
ROSALINO  I: ROSALINO spoke with patient's daughter to discuss discharge plan with hospice. Patient's daughter would like patient to go to [facility] ([facility]) with [facility]. ROSALINO called and left message with Delores at [facility]. ROSALINO awaits a c/b to confirm bed. ROSALINO will update family in AM.     -ROSALINO called Allie at [facility] to discuss d.c plan. Allie states she will assess in the AM. Allie would like an update in the AM before confirming they can [phone]. Hospice will need to be arranged prior to patient returning. If d.cing to [facility] is preference. If d/cing to [facility]preference is [facility]. [facility] cannot manage NG tube or suctioning. No Iv's.     P: ROSALINO will continue to follow and assist as needed.    Lesly Lyman, AVE   [phone]

## 2019-01-15 NOTE — PLAN OF CARE
Comfort cares. Vitals deferred. Lethargic, arouses to voice. Turn/repo Q2H with A2. IV dilaudid given Q2H as scheduled for comfort. Ativan available PRN. NPO. NG tube connected to low intermittent suction, 700mL brown output. R midline S/L. Patterson catheter, 200mL UOP. Blanchable redness on coccyx. Resting comfortably between cares. Continue to monitor

## 2019-01-15 NOTE — PROGRESS NOTES
[facility]    Hospitalist Progress Note    Assessment & Plan   Ms. Tita Escobar is an 86-year-old female with a history of atrial fibrillation on Pradaxa, hypertension, previous CVA, and mild cognitive impairment admitted on 1/6/2019 with abdominal pain.     Ischemic colitis with necrotic cecum status post exploratory laparotomy and right colectomy.    -Presented with abdominal pain for several days, clinically dehydrated with WBC of 32,000, elevated lactate.  CT abdomen concerning for ischemic colitis with prominent right colon bowel wall thickening with mesenteric edema or inflammation and adjacent free fluid with suspicion for pneumatosis.    Underwent emergent exploratory laparotomy and right colectomy on 1/7 by colorectal surgery. Estimated blood loss 50 mL.  Intraoperatively found to have bloody purulent fluid without any anel perforation.  -Last bloody bowel movement 1/11, abdominal pain improving  -Was on IV Zosyn 1/6 - 1/11 as recommended by colorectal surgery.    -CT scan of the abdomen and pelvis on 1/14 showed pneumatosis of the small intestine with air in the superior mesenteric vein as well as in the liver.  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  -Per vascular surgery very complicated situation where the clinical examination and finding the CT scan was concerning for more necrotic small intestine in the peritoneal cavity.  The options presented with the family:  1.  Exploratory laparotomy with small bowel resection with either superior mesenteric artery bypass of superior mesenteric artery stenting.  2.  Comfort measures only.     -Patient's daughter and  have opted for comfort measures only.  -Continue NG suctioning for comfort measures as patient had a markedly distended stomach.    -IV Dilaudid scheduled, as needed Ativan  - consult for hospice evaluation      Acute symptomatic anemia likely dilutional/blood loss.  Status  post 1 PRBC transfusion 1/10.  Atrial fibrillation with rapid ventricular response -likely due to surgical stress/volume overload  Has history of AFib and sees Dr. Augustine of [facility].  TSH 3.31.  PTA Pradaxa 150 mg twice daily for anticoagulation and Cardizem 360 mg daily.    Pradaxa was reversed with Praxbind pre-operatively.  Was on Cardizem drip from 1/6 to 1/9 and switch to oral Cardizem.  Currently on Cardizem drip from 1/11 for RVR.  Tolerating clear liquid diet hence will restart PTA extended release 360 mg oral Cardizem and taper off Cardizem drip. Cardiology signed off 1/12.  Appreciate comanagement.     Mild volume overload likely due to fluid resuscitation/postoperative.  Status post postoperative hypoxia -likely from volume overload/atelectasis.  Benign essential hypertension.    Mild cognitive impairment with possible mild acute delirium.    Chronic kidney disease, stage II.   Hyponatremia  improved   Physical deconditioning from acute illness  -Continue comfort measures, hospice consult today.           # Pain Assessment:  Current Pain Score 1/15/2019   Patient currently in pain? denies   Pain score (0-10) -   comfort cares    D/W: RN  DVT Prophylaxis:comfort cares  Code Status: DNR/DNI    Disposition: Expected discharge in a day or 2 with hospice    Kaveh Salas MD    Interval History   Transition to comfort measures was made yesterday.  After discussing with the daughter at the bedside will ask for hospice evaluation.  No acute nursing report.  Patient appears to be fairly comfortable.    -Data reviewed today: I reviewed all new labs and imaging results over the last 24 hours. I personally reviewed no images or EKG's today.      Physical Exam   Temp: 98.2  F (36.8  C) Temp src: Oral BP: 163/77 Pulse: 93 Heart Rate: 106 Resp: 18 SpO2: 94 % O2 Device: Nasal cannula Oxygen Delivery: 5 LPM  Vitals:    01/11/19 0430 01/12/19 0614 01/14/19 0614   Weight: 84.2 kg (185 lb 10 oz) 78.6 kg (173 lb 3.2 oz) 80  kg (176 lb 5.9 oz)     Vital Signs with Ranges  Temp:  [98.2  F (36.8  C)] 98.2  F (36.8  C)  Pulse:  [89-93] 93  Heart Rate:  [106] 106  Resp:  [16-20] 18  BP: (130-163)/(70-82) 163/77  SpO2:  [91 %-96 %] 94 %  I/O last 3 completed shifts:  In: -   Out: 5450 [Urine:2050; Emesis/NG output:3400]    Constitutional: Awake, confused, not in distress  Respiratory:   Normal WOB  Cardiovascular: RRR, No murmur  GI: Distended      Medications           sodium chloride (PF)  10 mL Intracatheter Q8H       Data     Recent Labs   Lab 01/14/19  0510 01/13/19  1840 01/13/19  0616  01/12/19  0543  01/10/19  0532   WBC 15.8*  --  10.0  --  9.2   < > 7.8   HGB 9.1* 8.7* 8.0*   < > 8.3*   < > 6.7*   MCV 92  --  92  --  91   < > 92     --  326  --  275   < > 204     --  135  --  137   < > 136   POTASSIUM 4.2  --  3.7  --  4.2   < > 4.2   CHLORIDE 98  --  99  --  102   < > 103   CO2 35*  --  29  --  28   < > 31   BUN 14  --  13  --  17   < > 20   CR 0.64  --  0.62  --  0.70   < > 0.81   ANIONGAP 4  --  7  --  7   < > 2*   MARK 8.2*  --  7.7*  --  7.6*   < > 7.3*   *  --  83  --  74   < > 71   ALBUMIN  --   --   --   --  2.2*  --  2.0*   PROTTOTAL  --   --   --   --  5.1*  --  4.7*   BILITOTAL  --   --   --   --  0.4  --  0.5   ALKPHOS  --   --   --   --  57  --  47   ALT  --   --   --   --  37  --  25   AST  --   --   --   --  30  --  24    < > = values in this interval not displayed.       Recent Results (from the past 24 hour(s))   CT Abdomen Pelvis w Contrast   Result Value    Radiologist flags Portal venous gas (AA)    Narrative    CT ABDOMEN AND PELVIS WITH CONTRAST 1/14/2019 11:01 AM     HISTORY: Abdominal pain, unspecified. POD#8 ex lap, right colectomy;  worsening abdominal pain and leukocytosis.    COMPARISON: January 6, 2019    TECHNIQUE: Volumetric helical acquisition of CT images from the lung  bases through the symphysis pubis after the administration of  89 mL  Isovue-370  intravenous contrast.  Radiation dose for this scan was  reduced using automated exposure control, adjustment of the mA and/or  kV according to patient size, or iterative reconstruction technique.    FINDINGS: There is pneumatosis and dilatation of the jejunum with  mesenteric venous gas and portal venous gas concerning for bowel  ischemia. No definite intraperitoneal free air. Stomach is distended  with fluid. Diffuse dilatation of the small bowel likely related to  ileus. Extensive calcified atherosclerosis at the origin of the  superior mesenteric artery. Some degree of stenosis renal cyst noted  on the right. Adrenal glands, liver, spleen, kidneys, and pancreas  demonstrate no worrisome focal lesion. Small amount of free fluid. No  free intraperitoneal air. Small bilateral pleural effusions and  associated atelectasis and/or infiltrate. No frankly destructive bony  lesions.      Impression    IMPRESSION: Pneumatosis, mesenteric venous air and portal venous air  concerning for bowel ischemia in the proximal jejunum. There is  significant calcified atherosclerosis of the origin of the SMA and  probably some degree of narrowing of the origin of the celiac axis,  question whether this is ischemic in etiology.     [Critical Result: Portal venous gas]    Finding was identified on 1/14/2019 11:14 AM.     BHAVNA Reed was contacted by me at 1/14/2019 11:29 AM and  verbalized understanding of the critical finding.     GARY ABDI MD

## 2019-01-16 NOTE — PROVIDER NOTIFICATION
MD Notification    Notified Person: MD    Notified Person Name: Josue,    Notification Date/Time: 1/16/19 11:03 a.m.    Notification Interaction: FYI that SW has confirmed patient can go home ([facility] with [facility]) for 15:00 asking for hospice orders 3 days of rx for discharge today if ready (patient currently on IV not sure if we need to transition to liquid/solutab)     Purpose of Notification: FYI with update on Hospice and possible hospice discharge orders if medically stable.     Orders Received: MD to come and talk to the daughter and bedside. He will provide CC/SW with an up.  Addendum: Per MD transition to PO CMO and discharge tomorrow if stable for transport. CC updated [facility] and they are requesting an a.m. Stretcher as well as [facility] admission to [facility] passed along to SW.     Comments:

## 2019-01-16 NOTE — PLAN OF CARE
Comfort cares. Vitals deferred. Using O2 for comfort. Lethargic, arouses to voice. Turn/repo per comfort. IV dilaudid given Q2H as scheduled for comfort. NPO. frequent oral cares provided. NG tube connected to low intermittent suction, brown/green output. R midline SL. Patterson patent with adequate clear yellow output. Blanchable redness on coccyx. Resting comfortably between cares. Will continue providing support.

## 2019-01-16 NOTE — PLAN OF CARE
Pt disoriented x4- on comfort cares- On 4 L O2. Pt c/o generalized pain- scheduled dilaudid given q2h. Pt removed NG tube after 250 ml dark output- pt refused re-insertion. BS hypoactive, abdomen distended- Gave zofran x1 to control nausea. R midline SL. T/R & oral cares w/ biotene spray done for comfort. Patterson in place- 200 ml output. Slept between cares.

## 2019-01-16 NOTE — PROGRESS NOTES
[facility]    Hospitalist Progress Note    Assessment & Plan   Ms. Tita Escobar is an 86-year-old female with a history of atrial fibrillation on Pradaxa, hypertension, previous CVA, and mild cognitive impairment admitted on 1/6/2019 with abdominal pain.     Ischemic colitis with necrotic cecum status post exploratory laparotomy and right colectomy.    -Presented with abdominal pain for several days, clinically dehydrated with WBC of 32,000, elevated lactate.  CT abdomen concerning for ischemic colitis with prominent right colon bowel wall thickening with mesenteric edema or inflammation and adjacent free fluid with suspicion for pneumatosis.    Underwent emergent exploratory laparotomy and right colectomy on 1/7 by colorectal surgery. Estimated blood loss 50 mL.  Intraoperatively found to have bloody purulent fluid without any anel perforation.  -Last bloody bowel movement 1/11, abdominal pain improving  -Was on IV Zosyn 1/6 - 1/11 as recommended by colorectal surgery.    -CT scan of the abdomen and pelvis on 1/14 showed pneumatosis of the small intestine with air in the superior mesenteric vein as well as in the liver.  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  -Per vascular surgery very complicated situation where the clinical examination and finding the CT scan was concerning for more necrotic small intestine in the peritoneal cavity.  The options presented with the family:  1.  Exploratory laparotomy with small bowel resection with either superior mesenteric artery bypass of superior mesenteric artery stenting.  2.  Comfort measures only.     -Continue comfort measures only.  -Patient accidentally removed NG tube last night, continue to monitor for any discomfort while off NG.    -Discontinue IV Dilaudid and Ativan and switch to high concentration oral  -Scheduled Zofran every 6 hours  -If reasonably comfortable without any worsening abdominal symptoms on  high concentration regimen, likely discharge with hospice tomorrow  -If abdominal distention worsens, would suggest reinserting NG tube for comfort purposes per     Acute symptomatic anemia likely dilutional/blood loss.  Status post 1 PRBC transfusion 1/10.  Atrial fibrillation with rapid ventricular response -likely due to surgical stress/volume overload  Status post postoperative hypoxia -likely from volume overload/atelectasis.  Benign essential hypertension.    Mild cognitive impairment with possible mild acute delirium.    Chronic kidney disease, stage II.   Hyponatremia   Physical deconditioning from acute illness  -Continue comfort measures as above          # Pain Assessment:  Current Pain Score 1/16/2019   Patient currently in pain? yes   Pain score (0-10) 6   comfort cares    D/W: RN  DVT Prophylaxis:comfort cares  Code Status: DNR/DNI    Disposition: Expected discharge 1/17 with hospice  Kaveh Salas MD    Interval History   NG tube was removed overnight by patient.  Transition from IV to high concentration pain medication.  Appears reasonably comfortable    -Data reviewed today: I reviewed all new labs and imaging results over the last 24 hours. I personally reviewed no images or EKG's today.      Physical Exam             Resp: 16   O2 Device: Nasal cannula Oxygen Delivery: 4 LPM  Vitals:    01/11/19 0430 01/12/19 0614 01/14/19 0614   Weight: 84.2 kg (185 lb 10 oz) 78.6 kg (173 lb 3.2 oz) 80 kg (176 lb 5.9 oz)     Vital Signs with Ranges  Resp:  [16-18] 16  I/O last 3 completed shifts:  In: -   Out: 1700 [Urine:600; Emesis/NG output:1100]    Constitutional: Awake, confused, not in distress  Respiratory:   Normal WOB  Cardiovascular: RRR, No murmur  GI: Distended, fairly soft on palpation      Medications           HYDROmorphone (STANDARD CONC)  2 mg Oral Q2H    Or     HYDROmorphone  1-2 mg Oral Q2H     ondansetron  4 mg Oral Q6H    Or     ondansetron  4 mg Intravenous Q6H     sodium chloride (PF)   10 mL Intracatheter Q8H       Data     Recent Labs   Lab 01/14/19  0510 01/13/19  1840 01/13/19  0616  01/12/19  0543  01/10/19  0532   WBC 15.8*  --  10.0  --  9.2   < > 7.8   HGB 9.1* 8.7* 8.0*   < > 8.3*   < > 6.7*   MCV 92  --  92  --  91   < > 92     --  326  --  275   < > 204     --  135  --  137   < > 136   POTASSIUM 4.2  --  3.7  --  4.2   < > 4.2   CHLORIDE 98  --  99  --  102   < > 103   CO2 35*  --  29  --  28   < > 31   BUN 14  --  13  --  17   < > 20   CR 0.64  --  0.62  --  0.70   < > 0.81   ANIONGAP 4  --  7  --  7   < > 2*   MARK 8.2*  --  7.7*  --  7.6*   < > 7.3*   *  --  83  --  74   < > 71   ALBUMIN  --   --   --   --  2.2*  --  2.0*   PROTTOTAL  --   --   --   --  5.1*  --  4.7*   BILITOTAL  --   --   --   --  0.4  --  0.5   ALKPHOS  --   --   --   --  57  --  47   ALT  --   --   --   --  37  --  25   AST  --   --   --   --  30  --  24    < > = values in this interval not displayed.       No results found for this or any previous visit (from the past 24 hour(s)).

## 2019-01-16 NOTE — PROGRESS NOTES
SW:    D: ROSALINO called Delores of [facility] [phone] who confirms that they can accept pt back to their [facility]. Asking SW for discharge time, SW indicating time is TBD. ROSALINO called [facility] and made referral for pt, SW faxed requested docs. Hospice agency asking SW to call with discharge time. Pt will likely need stretcher transport due to medical status.     P: SW will continue to coordinate discharge.     ADDENDUM:    D: ROSALINO has spoken with pt daughter informing that pt can return to [facility]. ROSALINO scheduled transport for 1500 via [facility] stretcher.     ADDENDUM:    D: Discharge delayed per MD until tomorrow. [facility] is aware of delay in discharge. ROSALINO called [facility] and rescheduled transport for 1130am 1/17/19. ROSALINO placed call to [facility] and informed them of discharge delay until tomorrow, [facility] will have staff Tenisha out around noon - and they will bring O2. ROSALINO informed [facility] of 1130 transoprt, [facility] requesting SW fax discharge orders to  [phone] Attn: Billie and VERA Kelley tomorrow.     P: ROSALINO will continue to assist in discharge planning as needed.     AVE Sheppard

## 2019-01-17 ASSESSMENT — ACTIVITIES OF DAILY LIVING (ADL)
ADLS_ACUITY_SCORE: 22
ADLS_ACUITY_SCORE: 22

## 2019-01-17 NOTE — DEATH PRONOUNCEMENT
MD DEATH PRONOUNCEMENT    Called to pronounce Tita Escobar dead.    Physical Exam: Unresponsive to noxious stimuli, Spontaneous respirations absent and Breath sounds absent    Patient was pronounced dead at 2355.    Active Problems:    Ischemic colitis (H)    Colon perforation (H)       Infectious disease present?: NO    Communicable disease present? (examples: HIV, chicken pox, TB, Ebola, CJD) :  NO    Multi-drug resistant organism present? (example: MRSA): NO    Please consider an autopsy if any of the following exist:  NO Unexpected or unexplained death during or following any dental, medical, or surgical diagnostic treatment procedures.   NO Death of mother at or up to seven days after delivery.     NO All  and pediatric deaths.     NO Death where the cause is sufficiently obscure to delay completion of the death certificate.   NO Deaths in which autopsy would confirm a suspected illness/condition that would affect surviving family members or recipients of transplanted organs.     The following deaths must be reported to the 's Office:  NO A death that may be due entirely or in part to any factors other than natural disease (recent surgery, recent trauma, suspected abuse/neglect).   NO A death that may be an accident, suicide, or homicide.     NO Any sudden, unexpected death in which there is no prior history of significant heart disease or any other condition associated with sudden death.   NO A death under suspicious, unusual, or unexpected circumstances.    NO Any death which is apparently due to natural causes but in which the  does not have a personal physician familiar with the patient s medical history, social, or environmental situation or the circumstances of the terminal event.   NO Any death apparently due to Sudden Infant Death Syndrome.     NO Deaths that occur during, in association with, or as consequences of a diagnostic, therapeutic, or anesthetic  procedure.   NO Any death in which a fracture of a major bone has occurred within the past (6) six months.   NO A death of persons note seen by their physician within 120 days of demise.     NO Any death in which the  was an inmate of a public institution or was in the custody of Law Enforcement personnel.   NO  All unexpected deaths of children   NO Solid organ donors   NO Unidentified bodies   NO Deaths of persons whose bodies are to be cremated or otherwise disposed of so that the bodies will later be unavailable for examination;   NO Deaths unattended by a physician outside of a licensed healthcare facility or licensed residential hospice program   NO Deaths occurring within 24 hours of arrival to a health care facility if death is unexpected.    NO Deaths associated with the decedent s employment.   NO Deaths attributed to acts of terrorism.   NO Any death in which there is uncertainty as to whether it is a medical examiner s care should be discussed with the medical investigator.        Body disposition: [facility]    Adriane Reveles PA-C  Hospitalist Physician Assistant  Pager: [phone]

## 2019-01-17 NOTE — PROGRESS NOTES
Pt  at 2355. Family notified. No belongings. Donor line notified. Body sent down with security at 0405.

## 2019-01-17 NOTE — PLAN OF CARE
Pt was awake and alert this morning and afternoon, became more lethargic and unresponsive this evening. Shallow breathing. Family at bedside. Scheduled PO dilaudid given Q2H. C/o nausea, scheduled IV Zofran and IV Ativan given with relief. R midline SL. Turn/repo per comfort. Frequent oral cares done and biotene spray applied for dry mouth. Will continue providing comfort cares.

## 2019-01-17 NOTE — DISCHARGE SUMMARY
[facility]    Death Summary  Hospitalist    Date of Admission:  1/6/2019  Date of Death:         1/16/2019  Provider Completing Death Summary: Kaveh Salas MD    Discharge Diagnoses      Ischemic colitis with necrotic cecum status post exploratory laparotomy and right colectomy.    Acute symptomatic anemia likely dilutional and due to blood loss.  Atrial fibrillation with rapid ventricular response  Post postoperative hypoxia -likely from volume overload and atelectasis.  Benign essential hypertension.    Mild cognitive impairment with possible mild acute delirium.    Chronic kidney disease, stage II.   Hyponatremia   Physical deconditioning from acute illness      Hospital Course   Ms. Tita Escobar is an 86-year-old female with a history of atrial fibrillation on Pradaxa, hypertension, previous CVA, and mild cognitive impairment admitted on 1/6/2019 with abdominal pain.     Ischemic colitis with necrotic cecum status post exploratory laparotomy and right colectomy.    -Presented with abdominal pain for several days, clinically dehydrated with WBC of 32,000, elevated lactate.  CT abdomen concerning for ischemic colitis with prominent right colon bowel wall thickening with mesenteric edema or inflammation and adjacent free fluid with suspicion for pneumatosis.    -Underwent emergent exploratory laparotomy and right colectomy on 1/7 by colorectal surgery. Estimated blood loss 50 mL.  Intraoperatively found to have bloody purulent fluid without any anel perforation.  -Was on IV Zosyn 1/6 - 1/11 as recommended by colorectal surgery.    -Clinically deteriorated after initial improvement  -CT scan of the abdomen and pelvis on 1/14 showed pneumatosis of the small intestine with air in the superior mesenteric vein as well as in the liver.  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  -Per vascular surgery very complicated situation where the clinical examination and  finding the CT scan was concerning for more necrotic small intestine in the peritoneal cavity.  The options presented to the family were:  1.  Exploratory laparotomy with small bowel resection with either superior mesenteric artery bypass of superior mesenteric artery stenting.  2.  Comfort measures only.  -Patient's daughter and  opted for comfort measures only  -hospice was consulted  -Patient was passed away at 2355, January 16, 2019.         Kaveh Salas MD           Pending Results   Unresulted Labs Ordered in the Past 30 Days of this Admission     No orders found from 11/7/2018 to 1/7/2019.          Primary Care Physician   Physician No Ref-Primary    Consultations This Hospital Stay   CARDIOLOGY IP CONSULT  COLORECTAL SURGERY IP CONSULT  PHARMACY IP CONSULT  PHYSICAL THERAPY ADULT IP CONSULT  OCCUPATIONAL THERAPY ADULT IP CONSULT  CARE TRANSITION RN/SW IP CONSULT  CARDIOLOGY IP CONSULT  VASCULAR ACCESS ADULT IP CONSULT  [place] [facility] IP CONSULT  VASCULAR SURGERY IP CONSULT  VASCULAR ACCESS ADULT IP CONSULT  SOCIAL WORK IP CONSULT    Time Spent on this Encounter   I, Kaveh Salas, personally saw the patient today and spent less than or equal to 30 minutes discharging this patient.    Data   Most Recent 3 CBC's:  Recent Labs   Lab Test 01/14/19  0510 01/13/19  1840 01/13/19  0616  01/12/19  0543   WBC 15.8*  --  10.0  --  9.2   HGB 9.1* 8.7* 8.0*   < > 8.3*   MCV 92  --  92  --  91     --  326  --  275    < > = values in this interval not displayed.      Most Recent 3 BMP's:  Recent Labs   Lab Test 01/14/19  0510 01/13/19  0616 01/12/19  0543    135 137   POTASSIUM 4.2 3.7 4.2   CHLORIDE 98 99 102   CO2 35* 29 28   BUN 14 13 17   CR 0.64 0.62 0.70   ANIONGAP 4 7 7   MARK 8.2* 7.7* 7.6*   * 83 74     Most Recent 2 LFT's:  Recent Labs   Lab Test 01/12/19  0543 01/10/19  0532   AST 30 24   ALT 37 25   ALKPHOS 57 47   BILITOTAL 0.4 0.5     Most Recent INR's and  Anticoagulation Dosing History:  Anticoagulation Dose History     Recent Dosing and Labs Latest Ref Rng & Units 9/21/2009 9/22/2009 11/1/2009 11/11/2010 12/7/2010 1/6/2019 1/6/2019    INR 0.86 - 1.14 2.18(H) 2.31(H) 1.82(H) 2.37(H) 1.20(H) 2.72(H) 1.54(H)        Most Recent 3 Troponin's:  Recent Labs   Lab Test 12/25/18  1455 11/11/10  1445   TROPI <0.015 <0.012     Most Recent Cholesterol Panel:  Recent Labs   Lab Test 04/19/17  1537   CHOL 157   LDL 71   HDL 52   TRIG 171*     Most Recent 6 Bacteria Isolates From Any Culture (See EPIC Reports for Culture Details):  Recent Labs   Lab Test 01/06/19  1754 01/06/19  1753 01/06/19  1030 01/06/19  1000   CULT No anaerobes isolated  No growth No anaerobes isolated  No growth No growth No growth     Most Recent TSH, T4 and A1c Labs:  Recent Labs   Lab Test 01/11/19  0550   TSH 3.31

## 2019-01-17 NOTE — PROGRESS NOTES
ROSALINO Note:    D/I:  SW placed call to [facility] to cancel ride for patient.  ROSALINO also left VM for Delores with [facility] and updated [facility] that patient passed away.      Hair Jarvis, BINTA, LGSW

## (undated) DEVICE — SUCTION CANISTER MEDIVAC LINER 3000ML W/LID 65651-530

## (undated) DEVICE — DRAPE BREAST/CHEST 29420

## (undated) DEVICE — SOL WATER IRRIG 1000ML BOTTLE 2F7114

## (undated) DEVICE — ESU GROUND PAD UNIVERSAL W/O CORD

## (undated) DEVICE — GOWN IMPERVIOUS SPECIALTY XL/XLONG 39049

## (undated) DEVICE — SU MONOCRYL 4-0 PS-2 18" UND Y496G

## (undated) DEVICE — GLOVE PROTEXIS BLUE W/NEU-THERA 7.5  2D73EB75

## (undated) DEVICE — PEN MARKING SKIN

## (undated) DEVICE — STPL LINEAR CUT 100MM TLC10

## (undated) DEVICE — ESU ELEC BLADE 6" COATED/INSULATED E1455-6

## (undated) DEVICE — PREP CHLORAPREP 26ML TINTED ORANGE  260815

## (undated) DEVICE — STPL LINEAR 90 X 3.5MM TA9035S

## (undated) DEVICE — PACK MAJOR SBA15MAFSI

## (undated) DEVICE — SU PDS II 0 CTX 60" Z990G

## (undated) DEVICE — SU VICRYL 0 TIE 12X18" J906G

## (undated) DEVICE — DRAPE IOBAN INCISE 23X17" 6650EZ

## (undated) DEVICE — SU VICRYL 3-0 SH CR 8X18" J774

## (undated) DEVICE — SOL NACL 0.9% IRRIG 1000ML BOTTLE 07138-09

## (undated) DEVICE — Device

## (undated) DEVICE — SU VICRYL 2-0 TIE 12X18" J905T

## (undated) DEVICE — GLOVE PROTEXIS W/NEU-THERA 7.5  2D73TE75

## (undated) DEVICE — SUCTION TIP POOLE K770

## (undated) DEVICE — SU VICRYL 2-0 SH 27" J317H

## (undated) DEVICE — SYR 10ML FINGER CONTROL W/O NDL 309695

## (undated) DEVICE — ESU LIGASURE IMPACT OPEN SEALER/DVDR CVD LG JAW LF4418

## (undated) DEVICE — LINEN TOWEL PACK X5 5464

## (undated) RX ORDER — LIDOCAINE HYDROCHLORIDE 20 MG/ML
INJECTION, SOLUTION EPIDURAL; INFILTRATION; INTRACAUDAL; PERINEURAL
Status: DISPENSED
Start: 2019-01-01

## (undated) RX ORDER — DEXAMETHASONE SODIUM PHOSPHATE 4 MG/ML
INJECTION, SOLUTION INTRA-ARTICULAR; INTRALESIONAL; INTRAMUSCULAR; INTRAVENOUS; SOFT TISSUE
Status: DISPENSED
Start: 2019-01-01

## (undated) RX ORDER — BUPIVACAINE HYDROCHLORIDE 5 MG/ML
INJECTION, SOLUTION EPIDURAL; INTRACAUDAL
Status: DISPENSED
Start: 2019-01-01

## (undated) RX ORDER — PROPOFOL 10 MG/ML
INJECTION, EMULSION INTRAVENOUS
Status: DISPENSED
Start: 2019-01-01

## (undated) RX ORDER — ONDANSETRON 2 MG/ML
INJECTION INTRAMUSCULAR; INTRAVENOUS
Status: DISPENSED
Start: 2019-01-01

## (undated) RX ORDER — FENTANYL CITRATE 50 UG/ML
INJECTION, SOLUTION INTRAMUSCULAR; INTRAVENOUS
Status: DISPENSED
Start: 2019-01-01